# Patient Record
Sex: MALE | Race: WHITE | Employment: OTHER | URBAN - METROPOLITAN AREA
[De-identification: names, ages, dates, MRNs, and addresses within clinical notes are randomized per-mention and may not be internally consistent; named-entity substitution may affect disease eponyms.]

---

## 2017-08-01 ENCOUNTER — HOSPITAL ENCOUNTER (EMERGENCY)
Age: 77
Discharge: HOME | End: 2017-08-01
Attending: EMERGENCY MEDICINE
Payer: MEDICARE

## 2017-08-01 ENCOUNTER — APPOINTMENT (OUTPATIENT)
Dept: GENERAL RADIOLOGY | Age: 77
End: 2017-08-01
Attending: EMERGENCY MEDICINE
Payer: MEDICARE

## 2017-08-01 VITALS
TEMPERATURE: 98 F | BODY MASS INDEX: 32.93 KG/M2 | WEIGHT: 230 LBS | HEART RATE: 71 BPM | DIASTOLIC BLOOD PRESSURE: 74 MMHG | SYSTOLIC BLOOD PRESSURE: 131 MMHG | OXYGEN SATURATION: 96 % | RESPIRATION RATE: 18 BRPM | HEIGHT: 70 IN

## 2017-08-01 DIAGNOSIS — M25.552 LEFT HIP PAIN: Primary | ICD-10-CM

## 2017-08-01 PROCEDURE — 73502 X-RAY EXAM HIP UNI 2-3 VIEWS: CPT

## 2017-08-01 PROCEDURE — 74011250637 HC RX REV CODE- 250/637: Performed by: EMERGENCY MEDICINE

## 2017-08-01 PROCEDURE — 99283 EMERGENCY DEPT VISIT LOW MDM: CPT | Performed by: EMERGENCY MEDICINE

## 2017-08-01 RX ORDER — HYDROCODONE BITARTRATE AND ACETAMINOPHEN 5; 325 MG/1; MG/1
1 TABLET ORAL ONCE
Status: COMPLETED | OUTPATIENT
Start: 2017-08-01 | End: 2017-08-01

## 2017-08-01 RX ADMIN — HYDROCODONE BITARTRATE AND ACETAMINOPHEN 1 TABLET: 5; 325 TABLET ORAL at 11:08

## 2017-08-01 NOTE — ED TRIAGE NOTES
Pt states pain to his left hip for the past week or so. Pt states \"my hip just goes out on me\". Pt denies any recent trauma or falls.

## 2017-08-01 NOTE — ED PROVIDER NOTES
HPI Comments: 68-year-old male with history of left hip replacement presents with complaint of aching, nonradiating left hip pain that has been present over the past several weeks. Denies any recent trauma or injury. Patient rates the severity of pain as mild. Patient denies any numbness, tingling, major issues with ambulating, PC, SOB, nausea, vomiting. Denies any alleviating factors. States pain is exacerbated with movement. Patient is a 78 y.o. male presenting with hip pain. The history is provided by the patient. No  was used. Hip Injury    This is a recurrent problem. The current episode started more than 1 week ago. The problem occurs constantly. The problem has not changed since onset. The pain is present in the left hip. The quality of the pain is described as aching. The pain is at a severity of 2/10. The pain is mild. Pertinent negatives include no numbness, full range of motion, no tingling, no itching, no back pain and no neck pain. He has tried OTC pain medications for the symptoms. The treatment provided mild relief. There has been no history of extremity trauma. Past Medical History:   Diagnosis Date    Arthritis     Atrial fibrillation ([address]     Predominantly atrial flutter; flecainide 2014; atrial flutter ablation 2/11/16; unclear extent of a fib    Calculus of kidney     Conjunctivitis unspecified     Diabetes mellitus type II, uncontrolled ([address]     BS run between(100-170). Low BS s/sx:weak, shakes, =bs below 60.    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    Type I (juvenile type) diabetes mellitus without mention of complication, not stated as uncontrolled     Unspecified hyperplasia of prostate without urinary obstruction and other lower urinary tract symptoms (LUTS)        Past Surgical History:   Procedure Laterality Date    HX BACK SURGERY  1989    bulging disc repair    HX HIP REPLACEMENT Left 3/1/12         Family History:   Problem Relation Age of Onset    Alzheimer Mother     Heart Failure Father      congestive    Heart Disease Father     Alzheimer Brother     Heart Disease Brother     Diabetes Sister     Malignant Hyperthermia Neg Hx     Pseudocholinesterase Deficiency Neg Hx     Delayed Awakening Neg Hx     Post-op Nausea/Vomiting Neg Hx     Emergence Delirium Neg Hx     Post-op Cognitive Dysfunction Neg Hx     Other Neg Hx     Cancer Neg Hx        Social History     Social History    Marital status:      Spouse name: N/A    Number of children: N/A    Years of education: N/A     Occupational History    Not on file. Social History Main Topics    Smoking status: Never Smoker    Smokeless tobacco: Never Used    Alcohol use No    Drug use: No    Sexual activity: Not on file     Other Topics Concern    Not on file     Social History Narrative         ALLERGIES: Review of patient's allergies indicates no known allergies. Review of Systems   Constitutional: Negative for chills and fever. HENT: Negative for congestion, facial swelling and sore throat. Eyes: Negative for pain, redness and visual disturbance. Respiratory: Negative for cough and shortness of breath. Cardiovascular: Negative for chest pain, palpitations and leg swelling. Gastrointestinal: Negative for abdominal pain, constipation, nausea and vomiting. Endocrine: Negative for polydipsia and polyphagia. Genitourinary: Negative for dysuria and hematuria. Musculoskeletal: Positive for arthralgias. Negative for back pain, joint swelling, myalgias and neck pain. Skin: Negative for itching, pallor and wound.    Neurological: Negative for dizziness, tingling, weakness, numbness and headaches. Psychiatric/Behavioral: Negative for agitation and confusion. Vitals:    08/01/17 1032   BP: 141/90   Pulse: 62   Resp: 18   Temp: 97.6 °F (36.4 °C)   SpO2: 96%   Weight: 104.3 kg (230 lb)   Height: 5' 10\" (1.778 m)            Physical Exam   Constitutional: He is oriented to person, place, and time. He appears well-developed and well-nourished. HENT:   Head: Normocephalic. Mouth/Throat: Oropharynx is clear and moist. No oropharyngeal exudate. Eyes: Conjunctivae and EOM are normal. Pupils are equal, round, and reactive to light. Neck: Normal range of motion. No JVD present. No tracheal deviation present. Cardiovascular: Normal rate, regular rhythm, normal heart sounds and intact distal pulses. Exam reveals no gallop and no friction rub. No murmur heard. Pulmonary/Chest: Effort normal and breath sounds normal. No respiratory distress. He has no wheezes. He has no rales. He exhibits no tenderness. Abdominal: Soft. Bowel sounds are normal. He exhibits no distension and no mass. There is no tenderness. There is no rebound and no guarding. Musculoskeletal: He exhibits no edema, tenderness or deformity. Mild left hip TTP. ROM slightly limited 2/2 pain. DP pulses 2+ and equal bilaterally. Strength 5/5 bilaterally. Normal sensory. Neurological: He is alert and oriented to person, place, and time. No cranial nerve deficit. Coordination normal.   Skin: Skin is warm and dry. Nursing note and vitals reviewed.        MDM  Number of Diagnoses or Management Options  Left hip pain: new and requires workup  Diagnosis management comments: Left hip fracture  Left hip Dislocation       Amount and/or Complexity of Data Reviewed  Tests in the radiology section of CPT®: reviewed and ordered  Review and summarize past medical records: yes  Independent visualization of images, tracings, or specimens: yes    Risk of Complications, Morbidity, and/or Mortality  Presenting problems: moderate  Diagnostic procedures: low  Management options: low    Patient Progress  Patient progress: stable    ED Course   Comment By Time   XR left hip w/ no acute osseous abnormality of the left hip evident by plain film imaging. Sofie Monterroso MD 08/01 0676   Patient states underwent hip replacement by Dr. Lizette Oscar. VSS. Pt well appearing. Pt ambulatory without assistance. Will have patient follow up with orthopedics in 1-2 days.  Sofie Monterroso MD 08/01 1149       Procedures

## 2017-11-27 PROBLEM — R42 DIZZINESS: Status: ACTIVE | Noted: 2017-11-27

## 2018-02-09 ENCOUNTER — HOSPITAL ENCOUNTER (EMERGENCY)
Age: 78
Discharge: HOME | End: 2018-02-09
Attending: EMERGENCY MEDICINE
Payer: MEDICARE

## 2018-02-09 ENCOUNTER — APPOINTMENT (OUTPATIENT)
Dept: CT IMAGING | Age: 78
End: 2018-02-09
Attending: EMERGENCY MEDICINE
Payer: MEDICARE

## 2018-02-09 VITALS
HEART RATE: 62 BPM | OXYGEN SATURATION: 94 % | HEIGHT: 70 IN | SYSTOLIC BLOOD PRESSURE: 153 MMHG | RESPIRATION RATE: 16 BRPM | BODY MASS INDEX: 32.93 KG/M2 | TEMPERATURE: 97.4 F | WEIGHT: 230 LBS | DIASTOLIC BLOOD PRESSURE: 66 MMHG

## 2018-02-09 DIAGNOSIS — R19.7 DIARRHEA OF PRESUMED INFECTIOUS ORIGIN: Primary | ICD-10-CM

## 2018-02-09 LAB
ALBUMIN SERPL-MCNC: 3.3 G/DL (ref 3.2–4.6)
ALBUMIN/GLOB SERPL: 0.9 {RATIO} (ref 1.2–3.5)
ALP SERPL-CCNC: 85 U/L (ref 50–136)
ALT SERPL-CCNC: 60 U/L (ref 12–65)
ANION GAP SERPL CALC-SCNC: 11 MMOL/L (ref 7–16)
AST SERPL-CCNC: 41 U/L (ref 15–37)
ATRIAL RATE: 85 BPM
BASOPHILS # BLD: 0 K/UL (ref 0–0.2)
BASOPHILS NFR BLD: 1 % (ref 0–2)
BILIRUB SERPL-MCNC: 0.4 MG/DL (ref 0.2–1.1)
BUN SERPL-MCNC: 14 MG/DL (ref 8–23)
CALCIUM SERPL-MCNC: 8.1 MG/DL (ref 8.3–10.4)
CALCULATED P AXIS, ECG09: 60 DEGREES
CALCULATED R AXIS, ECG10: 8 DEGREES
CALCULATED T AXIS, ECG11: 3 DEGREES
CHLORIDE SERPL-SCNC: 105 MMOL/L (ref 98–107)
CO2 SERPL-SCNC: 23 MMOL/L (ref 21–32)
CREAT SERPL-MCNC: 0.95 MG/DL (ref 0.8–1.5)
DIAGNOSIS, 93000: NORMAL
DIFFERENTIAL METHOD BLD: ABNORMAL
EOSINOPHIL # BLD: 0.1 K/UL (ref 0–0.8)
EOSINOPHIL NFR BLD: 2 % (ref 0.5–7.8)
ERYTHROCYTE [DISTWIDTH] IN BLOOD BY AUTOMATED COUNT: 13 % (ref 11.9–14.6)
GLOBULIN SER CALC-MCNC: 3.7 G/DL (ref 2.3–3.5)
GLUCOSE SERPL-MCNC: 258 MG/DL (ref 65–100)
HCT VFR BLD AUTO: 37.5 % (ref 41.1–50.3)
HGB BLD-MCNC: 12.3 G/DL (ref 13.6–17.2)
IMM GRANULOCYTES # BLD: 0.1 K/UL (ref 0–0.5)
IMM GRANULOCYTES NFR BLD AUTO: 2 % (ref 0–5)
LACTATE BLD-SCNC: 0.9 MMOL/L (ref 0.5–1.9)
LIPASE SERPL-CCNC: 526 U/L (ref 73–393)
LYMPHOCYTES # BLD: 1.2 K/UL (ref 0.5–4.6)
LYMPHOCYTES NFR BLD: 19 % (ref 13–44)
MCH RBC QN AUTO: 30.6 PG (ref 26.1–32.9)
MCHC RBC AUTO-ENTMCNC: 32.8 G/DL (ref 31.4–35)
MCV RBC AUTO: 93.3 FL (ref 79.6–97.8)
MONOCYTES # BLD: 0.4 K/UL (ref 0.1–1.3)
MONOCYTES NFR BLD: 6 % (ref 4–12)
NEUTS SEG # BLD: 4.4 K/UL (ref 1.7–8.2)
NEUTS SEG NFR BLD: 70 % (ref 43–78)
P-R INTERVAL, ECG05: 132 MS
PLATELET # BLD AUTO: 181 K/UL (ref 150–450)
PMV BLD AUTO: 10 FL (ref 10.8–14.1)
POTASSIUM SERPL-SCNC: 3.5 MMOL/L (ref 3.5–5.1)
PROT SERPL-MCNC: 7 G/DL (ref 6.3–8.2)
Q-T INTERVAL, ECG07: 376 MS
QRS DURATION, ECG06: 90 MS
QTC CALCULATION (BEZET), ECG08: 447 MS
RBC # BLD AUTO: 4.02 M/UL (ref 4.23–5.67)
SODIUM SERPL-SCNC: 139 MMOL/L (ref 136–145)
VENTRICULAR RATE, ECG03: 85 BPM
WBC # BLD AUTO: 6.3 K/UL (ref 4.3–11.1)

## 2018-02-09 PROCEDURE — 96374 THER/PROPH/DIAG INJ IV PUSH: CPT | Performed by: NURSE PRACTITIONER

## 2018-02-09 PROCEDURE — 83605 ASSAY OF LACTIC ACID: CPT

## 2018-02-09 PROCEDURE — 81003 URINALYSIS AUTO W/O SCOPE: CPT | Performed by: NURSE PRACTITIONER

## 2018-02-09 PROCEDURE — 74011636320 HC RX REV CODE- 636/320: Performed by: EMERGENCY MEDICINE

## 2018-02-09 PROCEDURE — 93005 ELECTROCARDIOGRAM TRACING: CPT | Performed by: EMERGENCY MEDICINE

## 2018-02-09 PROCEDURE — 85025 COMPLETE CBC W/AUTO DIFF WBC: CPT | Performed by: EMERGENCY MEDICINE

## 2018-02-09 PROCEDURE — 74011250636 HC RX REV CODE- 250/636: Performed by: EMERGENCY MEDICINE

## 2018-02-09 PROCEDURE — 83690 ASSAY OF LIPASE: CPT | Performed by: EMERGENCY MEDICINE

## 2018-02-09 PROCEDURE — 99284 EMERGENCY DEPT VISIT MOD MDM: CPT | Performed by: NURSE PRACTITIONER

## 2018-02-09 PROCEDURE — 74011000258 HC RX REV CODE- 258: Performed by: EMERGENCY MEDICINE

## 2018-02-09 PROCEDURE — 80053 COMPREHEN METABOLIC PANEL: CPT | Performed by: EMERGENCY MEDICINE

## 2018-02-09 RX ORDER — AMOXICILLIN AND CLAVULANATE POTASSIUM 875; 125 MG/1; MG/1
1 TABLET, FILM COATED ORAL 2 TIMES DAILY
Qty: 14 TAB | Refills: 0 | Status: SHIPPED | OUTPATIENT
Start: 2018-02-09 | End: 2018-02-16

## 2018-02-09 RX ORDER — SAME BUTANEDISULFONATE/BETAINE 400-600 MG
1 POWDER IN PACKET (EA) ORAL ONCE
Status: CANCELLED | OUTPATIENT
Start: 2018-02-09 | End: 2018-02-10

## 2018-02-09 RX ORDER — SODIUM CHLORIDE 0.9 % (FLUSH) 0.9 %
10 SYRINGE (ML) INJECTION
Status: COMPLETED | OUTPATIENT
Start: 2018-02-09 | End: 2018-02-09

## 2018-02-09 RX ORDER — METRONIDAZOLE 500 MG/1
500 TABLET ORAL
Status: CANCELLED | OUTPATIENT
Start: 2018-02-09 | End: 2018-02-09

## 2018-02-09 RX ORDER — ONDANSETRON 2 MG/ML
4 INJECTION INTRAMUSCULAR; INTRAVENOUS
Status: COMPLETED | OUTPATIENT
Start: 2018-02-09 | End: 2018-02-09

## 2018-02-09 RX ORDER — SODIUM CHLORIDE 9 MG/ML
1000 INJECTION, SOLUTION INTRAVENOUS ONCE
Status: COMPLETED | OUTPATIENT
Start: 2018-02-09 | End: 2018-02-09

## 2018-02-09 RX ORDER — CIPROFLOXACIN 500 MG/1
500 TABLET ORAL EVERY 12 HOURS
Status: CANCELLED | OUTPATIENT
Start: 2018-02-09

## 2018-02-09 RX ORDER — METRONIDAZOLE 500 MG/1
500 TABLET ORAL 2 TIMES DAILY
Qty: 14 TAB | Refills: 0 | Status: SHIPPED | OUTPATIENT
Start: 2018-02-09 | End: 2018-02-16

## 2018-02-09 RX ADMIN — IOPAMIDOL 100 ML: 755 INJECTION, SOLUTION INTRAVENOUS at 14:27

## 2018-02-09 RX ADMIN — SODIUM CHLORIDE 100 ML: 900 INJECTION, SOLUTION INTRAVENOUS at 14:28

## 2018-02-09 RX ADMIN — ONDANSETRON 4 MG: 2 INJECTION INTRAMUSCULAR; INTRAVENOUS at 13:47

## 2018-02-09 RX ADMIN — Medication 10 ML: at 14:28

## 2018-02-09 RX ADMIN — SODIUM CHLORIDE 1000 ML: 900 INJECTION, SOLUTION INTRAVENOUS at 13:47

## 2018-02-09 NOTE — DISCHARGE INSTRUCTIONS
Encourage plenty of fluids and rest, and advance her diet and activity as tolerated. Use the antibiotics and the probiotic as prescribed until gone. Follow-up with your regular doctor as discussed for further evaluation and treatment. Return to the ED if he has any new or worsened symptoms, including fever, worsening abdominal pain or swelling, difficulty urinating, difficulty walking, passing out, or chest pain. Gastroenteritis: Care Instructions  Your Care Instructions    Gastroenteritis is an illness that may cause nausea, vomiting, and diarrhea. It is sometimes called \"stomach flu. \" It can be caused by bacteria or a virus. You will probably begin to feel better in 1 to 2 days. In the meantime, get plenty of rest and make sure you do not become dehydrated. Dehydration occurs when your body loses too much fluid. Follow-up care is a key part of your treatment and safety. Be sure to make and go to all appointments, and call your doctor if you are having problems. It's also a good idea to know your test results and keep a list of the medicines you take. How can you care for yourself at home? · If your doctor prescribed antibiotics, take them as directed. Do not stop taking them just because you feel better. You need to take the full course of antibiotics. · Drink plenty of fluids to prevent dehydration, enough so that your urine is light yellow or clear like water. Choose water and other caffeine-free clear liquids until you feel better. If you have kidney, heart, or liver disease and have to limit fluids, talk with your doctor before you increase your fluid intake. · Drink fluids slowly, in frequent, small amounts, because drinking too much too fast can cause vomiting. · Begin eating mild foods, such as dry toast, yogurt, applesauce, bananas, and rice. Avoid spicy, hot, or high-fat foods, and do not drink alcohol or caffeine for a day or two.  Do not drink milk or eat ice cream until you are feeling better. How to prevent gastroenteritis  · Keep hot foods hot and cold foods cold. · Do not eat meats, dressings, salads, or other foods that have been kept at room temperature for more than 2 hours. · Use a thermometer to check your refrigerator. It should be between 34°F and 40°F.  · Defrost meats in the refrigerator or microwave, not on the kitchen counter. · Keep your hands and your kitchen clean. Wash your hands, cutting boards, and countertops with hot soapy water frequently. · Cook meat until it is well done. · Do not eat raw eggs or uncooked sauces made with raw eggs. · Do not take chances. If food looks or tastes spoiled, throw it out. When should you call for help? Call 911 anytime you think you may need emergency care. For example, call if:  ? · You vomit blood or what looks like coffee grounds. ? · You passed out (lost consciousness). ? · You pass maroon or very bloody stools. ?Call your doctor now or seek immediate medical care if:  ? · You have severe belly pain. ? · You have signs of needing more fluids. You have sunken eyes, a dry mouth, and pass only a little dark urine. ? · You feel like you are going to faint. ? · You have increased belly pain that does not go away in 1 to 2 days. ? · You have new or increased nausea, or you are vomiting. ? · You have a new or higher fever. ? · Your stools are black and tarlike or have streaks of blood. ? Watch closely for changes in your health, and be sure to contact your doctor if:  ? · You are dizzy or lightheaded. ? · You urinate less than usual, or your urine is dark yellow or brown. ? · You do not feel better with each day that goes by. Where can you learn more? Go to http://harper-mayco.info/. Enter N142 in the search box to learn more about \"Gastroenteritis: Care Instructions. \"  Current as of: March 3, 2017  Content Version: 11.4  © 6781-7009 Healthwise, [employer].  Care instructions adapted under license by [employer] (which disclaims liability or warranty for this information). If you have questions about a medical condition or this instruction, always ask your healthcare professional. [address] any warranty or liability for your use of this information. Recent Results (from the past 8 hour(s))   AMB POC URINALYSIS DIP STICK MANUAL W/ MICRO    Collection Time: 02/09/18 11:20 AM   Result Value Ref Range    Color (UA POC) Yellow     Clarity (UA POC) Clear     Glucose (UA POC) 4+ Negative    Bilirubin (UA POC) Negative Negative    Ketones (UA POC) 1+ Negative    Specific gravity (UA POC) 1.020 1.001 - 1.035    Blood (UA POC) 4+ Negative    pH (UA POC) 6.0 4.6 - 8.0    Protein (UA POC) 4+ Negative    Urobilinogen (UA POC) 0.2 mg/dL 0.2 - 1    Nitrites (UA POC) Negative Negative    Leukocyte esterase (UA POC) Negative Negative   CBC WITH AUTOMATED DIFF    Collection Time: 02/09/18 11:52 AM   Result Value Ref Range    WBC 6.3 4.3 - 11.1 K/uL    RBC 4.02 (L) 4.23 - 5.67 M/uL    HGB 12.3 (L) 13.6 - 17.2 g/dL    HCT 37.5 (L) 41.1 - 50.3 %    MCV 93.3 79.6 - 97.8 FL    MCH 30.6 26.1 - 32.9 PG    MCHC 32.8 31.4 - 35.0 g/dL    RDW 13.0 11.9 - 14.6 %    PLATELET 998 252 - 573 K/uL    MPV 10.0 (L) 10.8 - 14.1 FL    DF AUTOMATED      NEUTROPHILS 70 43 - 78 %    LYMPHOCYTES 19 13 - 44 %    MONOCYTES 6 4.0 - 12.0 %    EOSINOPHILS 2 0.5 - 7.8 %    BASOPHILS 1 0.0 - 2.0 %    IMMATURE GRANULOCYTES 2 0.0 - 5.0 %    ABS. NEUTROPHILS 4.4 1.7 - 8.2 K/UL    ABS. LYMPHOCYTES 1.2 0.5 - 4.6 K/UL    ABS. MONOCYTES 0.4 0.1 - 1.3 K/UL    ABS. EOSINOPHILS 0.1 0.0 - 0.8 K/UL    ABS. BASOPHILS 0.0 0.0 - 0.2 K/UL    ABS. IMM.  GRANS. 0.1 0.0 - 0.5 K/UL   METABOLIC PANEL, COMPREHENSIVE    Collection Time: 02/09/18 11:52 AM   Result Value Ref Range    Sodium 139 136 - 145 mmol/L    Potassium 3.5 3.5 - 5.1 mmol/L    Chloride 105 98 - 107 mmol/L    CO2 23 21 - 32 mmol/L    Anion gap 11 7 - 16 mmol/L Glucose 258 (H) 65 - 100 mg/dL    BUN 14 8 - 23 MG/DL    Creatinine 0.95 0.8 - 1.5 MG/DL    GFR est AA >60 >60 ml/min/1.73m2    GFR est non-AA >60 >60 ml/min/1.73m2    Calcium 8.1 (L) 8.3 - 10.4 MG/DL    Bilirubin, total 0.4 0.2 - 1.1 MG/DL    ALT (SGPT) 60 12 - 65 U/L    AST (SGOT) 41 (H) 15 - 37 U/L    Alk.  phosphatase 85 50 - 136 U/L    Protein, total 7.0 6.3 - 8.2 g/dL    Albumin 3.3 3.2 - 4.6 g/dL    Globulin 3.7 (H) 2.3 - 3.5 g/dL    A-G Ratio 0.9 (L) 1.2 - 3.5     LIPASE    Collection Time: 02/09/18 11:52 AM   Result Value Ref Range    Lipase 526 (H) 73 - 393 U/L   POC LACTIC ACID    Collection Time: 02/09/18  1:38 PM   Result Value Ref Range    Lactic Acid (POC) 0.9 0.5 - 1.9 mmol/L

## 2018-02-09 NOTE — ED PROVIDER NOTES
Patient is a 78 y.o. male presenting with abdominal pain. The history is provided by the patient. No  was used. Abdominal Pain    This is a new problem. The current episode started more than 2 days ago. The problem occurs constantly. The problem has been gradually worsening. The pain is associated with vomiting (diarrhea). The pain is located in the generalized abdominal region. The quality of the pain is cramping. Associated symptoms include diarrhea, nausea and vomiting (resolved now). Pertinent negatives include no fever, no constipation, no frequency, no hematuria, no chest pain and no back pain. this patient presents to ED with complaint of diffuse abdominal distention and discomfort described as cramping pain onset 7 days ago, and was seen and admitted at [facility] in [place], where he was discharged home last night. He states that they obtained several abdominal x-rays, laboratory work, stool studies, and a CT scan of his abdomen, which were all negative. He states that when he got home, he continued to have profuse diarrhea, has had 20 episodes of diarrhea onset since last night. In addition, he states that his abdomen has been progressively distending, and is complaining of generalized discomfort associated with this. Of note, he also states that he was treated for what appeared to be an aspiration pneumonia while at [facility] [place], though he states that he is not currently taking any antibiotics for this. Past Medical History:   Diagnosis Date    Arthritis     Atrial [address] [address]     Predominantly atrial flutter; flecainide 2014; atrial flutter ablation 2/11/16; unclear extent of a fib    Calculus of kidney     Conjunctivitis unspecified     Diabetes mellitus type II, uncontrolled ([address]     BS run between(100-170). Low BS s/sx:weak, shakes, =bs below 60.    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    Type I (juvenile type) diabetes mellitus without mention of complication, not stated as uncontrolled     Unspecified hyperplasia of prostate without urinary obstruction and other lower urinary tract symptoms (LUTS)        Past Surgical History:   Procedure Laterality Date    HX BACK SURGERY  1989    bulging disc repair    HX HIP REPLACEMENT Left 3/1/12         Family History:   Problem Relation Age of Onset    Alzheimer Mother     Heart Failure Father      congestive    Heart Disease Father     Alzheimer Brother     Heart Disease Brother     Diabetes Sister     Malignant Hyperthermia Neg Hx     Pseudocholinesterase Deficiency Neg Hx     Delayed Awakening Neg Hx     Post-op Nausea/Vomiting Neg Hx     Emergence Delirium Neg Hx     Post-op Cognitive Dysfunction Neg Hx     Other Neg Hx     Cancer Neg Hx        Social History     Social History    Marital status:      Spouse name: N/A    Number of children: N/A    Years of education: N/A     Occupational History    Not on file. Social History Main Topics    Smoking status: Never Smoker    Smokeless tobacco: Never Used    Alcohol use No    Drug use: No    Sexual activity: Not on file     Other Topics Concern    Not on file     Social History Narrative         ALLERGIES: Review of patient's allergies indicates no known allergies. Review of Systems   Constitutional: Negative for chills, fatigue and fever. HENT: Negative for congestion and sinus pressure. Eyes: Negative for discharge and redness. Respiratory: Negative for cough, chest tightness, shortness of breath and wheezing. Cardiovascular: Negative for chest pain and palpitations.    Gastrointestinal: Positive for abdominal pain, diarrhea, nausea and vomiting (resolved now). Negative for blood in stool and constipation. Genitourinary: Negative for flank pain, frequency and hematuria. Musculoskeletal: Negative for back pain and neck pain. Skin: Negative for rash and wound. Neurological: Positive for weakness. Negative for numbness. Hematological: Negative for adenopathy. Does not bruise/bleed easily. Vitals:    02/09/18 1142 02/09/18 1323   BP: 171/89 (!) 168/92   Pulse: 88 78   Resp: 18 14   Temp: 97.4 °F (36.3 °C)    SpO2: 96% 92%   Weight: 104.3 kg (230 lb)    Height: 5' 10\" (1.778 m)             Physical Exam   Constitutional: He is oriented to person, place, and time. He appears well-developed and well-nourished. No distress. HENT:   Head: Normocephalic and atraumatic. Neck: Normal range of motion. Neck supple. No tracheal deviation present. Cardiovascular: Normal rate, normal heart sounds and intact distal pulses. Exam reveals no gallop and no friction rub. No murmur heard. Pulmonary/Chest: Effort normal and breath sounds normal. No stridor. No respiratory distress. He has no wheezes. He has no rales. He exhibits no tenderness. Abdominal: Soft. Bowel sounds are normal. He exhibits distension (with tympany to percussion). There is tenderness (diffuse, generalized, mild). There is no rebound and no guarding. Musculoskeletal: Normal range of motion. He exhibits edema (1+ edema at B ankles). Neurological: He is alert and oriented to person, place, and time. Skin: Skin is warm and dry. No rash noted. No erythema. Vitals reviewed. MDM  Number of Diagnoses or Management Options  Diarrhea of presumed infectious origin: new and requires workup  Diagnosis management comments: Laboratory workup grossly within normal limits.   CT reported that patient was unable to undergo radiologic examination due to previous barium swallow study done at [facility].  Patient returned to his room. I discussed the plan of care with the family, who recommended that given that he had a prolonged hospital stay recently, that perhaps outpatient therapy would be warranted, and I was encouraged by his laboratory workup being within normal limits. The family seemed to be understanding of this, and I told him that I would readdress this with the patient once he returned from [facility].    2/9/2018  3:16 PM  Darrick Rodrigez  I discussed with the family present at the bedside and with the patient regarding the plan of care for outpatient therapy. I will order a dose of antibiotics to be given to the patient here in the ED, but the patient and family both would prefer to be dispositioned home at this time. I did discuss return precautions with him. The patient and the family at the bedside both voiced full understanding and compliance with the plan. Amount and/or Complexity of Data Reviewed  Clinical lab tests: reviewed and ordered  Tests in the radiology section of CPT®: ordered  Decide to obtain previous medical records or to obtain history from someone other than the patient: yes  Obtain history from someone other than the patient: yes  Review and summarize past medical records: yes  Discuss the patient with other providers: yes    Risk of Complications, Morbidity, and/or Mortality  Presenting problems: moderate  Diagnostic procedures: low  Management options: moderate    Patient Progress  Patient progress: stable    Patient seen and examined and discussed with Minda Trimble nurse practitioner Dr. Rosemary Romero student    72-year-old male discharged from [facility] yesterday. Had had diarrhea while in the hospital persisted today. Did not go home with any nausea medicine or any diarrhea medicines.       Unable to get CT of the a/p due to recent barium    Labs reviewed    Karma Hankins MD; 2/9/2018 @3:33 PM===========================================    ED Course       Procedures

## 2018-02-09 NOTE — ED TRIAGE NOTES
Pt was just discharged from [facility] last night for abdominal pain and swelling to his stomach. Pt states the symptoms are not getting any better so his doctor told him to come here today. Pt states he is still having diarrhea  Ems states the pt went into a fib with rvr that only lasted a min.

## 2018-02-09 NOTE — ED NOTES
I have reviewed discharge instructions with the patient. The patient verbalized understanding. Patient left ED via Discharge Method: wheelchair to Home with (insert name of family/friend, self, transport via wife). Opportunity for questions and clarification provided. Patient given 2 scripts. To continue your aftercare when you leave the hospital, you may receive an automated call from our care team to check in on how you are doing. This is a free service and part of our promise to provide the best care and service to meet your aftercare needs.  If you have questions, or wish to unsubscribe from this service please call [phone]. Thank you for Choosing our [facility] Emergency Department.

## 2018-02-09 NOTE — PROGRESS NOTES
Patient states he was at another hospital for 5 days, discharged last night. While there had an UGI, barium still in colon, can not have CT as it will streak due to this. Attempted to notify both Dr. Jaguar Aceves, ordering and RN both of whom were unavailable. Asked they call CT.

## 2018-04-23 ENCOUNTER — HOSPITAL ENCOUNTER (OUTPATIENT)
Dept: LAB | Age: 78
Discharge: HOME | End: 2018-04-23

## 2018-04-23 PROCEDURE — 88305 TISSUE EXAM BY PATHOLOGIST: CPT | Performed by: INTERNAL MEDICINE

## 2018-06-12 PROBLEM — D12.2 ADENOMATOUS POLYP OF ASCENDING COLON: Status: ACTIVE | Noted: 2018-06-12

## 2018-09-12 PROBLEM — E11.21 TYPE 2 DIABETES WITH NEPHROPATHY (HCC): Status: ACTIVE | Noted: 2018-09-12

## 2019-07-05 PROBLEM — E11.21 TYPE 2 DIABETES WITH NEPHROPATHY (HCC): Status: ACTIVE | Noted: 2019-07-05

## 2019-10-14 ENCOUNTER — HOSPITAL ENCOUNTER (OUTPATIENT)
Dept: LAB | Age: 79
Discharge: HOME | End: 2019-10-14
Payer: MEDICARE

## 2019-10-14 DIAGNOSIS — Z79.01 ANTICOAGULANT LONG-TERM USE: ICD-10-CM

## 2019-10-14 DIAGNOSIS — R06.09 DYSPNEA ON EXERTION: ICD-10-CM

## 2019-10-14 LAB
BASOPHILS # BLD: 0 K/UL (ref 0–0.2)
BASOPHILS NFR BLD: 1 % (ref 0–2)
DIFFERENTIAL METHOD BLD: ABNORMAL
EOSINOPHIL # BLD: 0.1 K/UL (ref 0–0.8)
EOSINOPHIL NFR BLD: 3 % (ref 0.5–7.8)
ERYTHROCYTE [DISTWIDTH] IN BLOOD BY AUTOMATED COUNT: 13.3 % (ref 11.9–14.6)
HCT VFR BLD AUTO: 33.6 % (ref 41.1–50.3)
HGB BLD-MCNC: 10.5 G/DL (ref 13.6–17.2)
IMM GRANULOCYTES # BLD AUTO: 0 K/UL (ref 0–0.5)
IMM GRANULOCYTES NFR BLD AUTO: 0 % (ref 0–5)
LYMPHOCYTES # BLD: 1.3 K/UL (ref 0.5–4.6)
LYMPHOCYTES NFR BLD: 26 % (ref 13–44)
MCH RBC QN AUTO: 30.6 PG (ref 26.1–32.9)
MCHC RBC AUTO-ENTMCNC: 31.3 G/DL (ref 31.4–35)
MCV RBC AUTO: 98 FL (ref 79.6–97.8)
MONOCYTES # BLD: 0.7 K/UL (ref 0.1–1.3)
MONOCYTES NFR BLD: 13 % (ref 4–12)
NEUTS SEG # BLD: 2.9 K/UL (ref 1.7–8.2)
NEUTS SEG NFR BLD: 56 % (ref 43–78)
NRBC # BLD: 0 K/UL (ref 0–0.2)
PLATELET # BLD AUTO: 155 K/UL (ref 150–450)
PMV BLD AUTO: 10.8 FL (ref 9.4–12.3)
RBC # BLD AUTO: 3.43 M/UL (ref 4.23–5.6)
WBC # BLD AUTO: 5.1 K/UL (ref 4.3–11.1)

## 2019-10-14 PROCEDURE — 85025 COMPLETE CBC W/AUTO DIFF WBC: CPT

## 2019-10-14 PROCEDURE — 36415 COLL VENOUS BLD VENIPUNCTURE: CPT

## 2019-12-17 ENCOUNTER — HOSPITAL ENCOUNTER (OUTPATIENT)
Dept: LAB | Age: 79
Discharge: HOME | End: 2019-12-17

## 2019-12-17 PROCEDURE — 88305 TISSUE EXAM BY PATHOLOGIST: CPT

## 2019-12-17 PROCEDURE — 88312 SPECIAL STAINS GROUP 1: CPT

## 2019-12-23 ENCOUNTER — HOME HEALTH ADMISSION (OUTPATIENT)
Dept: HOME HEALTH SERVICES | Facility: HOME HEALTH | Age: 79
End: 2019-12-23
Payer: MEDICARE

## 2019-12-23 ENCOUNTER — HOSPITAL ENCOUNTER (OUTPATIENT)
Dept: SURGERY | Age: 79
Discharge: HOME | End: 2019-12-23
Payer: MEDICARE

## 2019-12-23 ENCOUNTER — HOSPITAL ENCOUNTER (OUTPATIENT)
Dept: PHYSICAL THERAPY | Age: 79
Discharge: HOME | End: 2019-12-23
Payer: MEDICARE

## 2019-12-23 VITALS
BODY MASS INDEX: 33.36 KG/M2 | SYSTOLIC BLOOD PRESSURE: 159 MMHG | OXYGEN SATURATION: 95 % | HEART RATE: 56 BPM | DIASTOLIC BLOOD PRESSURE: 83 MMHG | HEIGHT: 70 IN | TEMPERATURE: 96.5 F | RESPIRATION RATE: 16 BRPM | WEIGHT: 233 LBS

## 2019-12-23 LAB
ANION GAP SERPL CALC-SCNC: 6 MMOL/L (ref 7–16)
APTT PPP: 29.5 SEC (ref 24.7–39.8)
BACTERIA SPEC CULT: NORMAL
BASOPHILS # BLD: 0 K/UL (ref 0–0.2)
BASOPHILS NFR BLD: 1 % (ref 0–2)
BUN SERPL-MCNC: 22 MG/DL (ref 8–23)
CALCIUM SERPL-MCNC: 9.2 MG/DL (ref 8.3–10.4)
CHLORIDE SERPL-SCNC: 107 MMOL/L (ref 98–107)
CO2 SERPL-SCNC: 27 MMOL/L (ref 21–32)
CREAT SERPL-MCNC: 1.19 MG/DL (ref 0.8–1.5)
DIFFERENTIAL METHOD BLD: ABNORMAL
EOSINOPHIL # BLD: 0.1 K/UL (ref 0–0.8)
EOSINOPHIL NFR BLD: 2 % (ref 0.5–7.8)
ERYTHROCYTE [DISTWIDTH] IN BLOOD BY AUTOMATED COUNT: 13.6 % (ref 11.9–14.6)
EST. AVERAGE GLUCOSE BLD GHB EST-MCNC: 177 MG/DL
GLUCOSE SERPL-MCNC: 174 MG/DL (ref 65–100)
HBA1C MFR BLD: 7.8 %
HCT VFR BLD AUTO: 38.5 % (ref 41.1–50.3)
HGB BLD-MCNC: 12.1 G/DL (ref 13.6–17.2)
IMM GRANULOCYTES # BLD AUTO: 0 K/UL (ref 0–0.5)
IMM GRANULOCYTES NFR BLD AUTO: 1 % (ref 0–5)
INR PPP: 1.3
LYMPHOCYTES # BLD: 1.4 K/UL (ref 0.5–4.6)
LYMPHOCYTES NFR BLD: 28 % (ref 13–44)
MCH RBC QN AUTO: 30.5 PG (ref 26.1–32.9)
MCHC RBC AUTO-ENTMCNC: 31.4 G/DL (ref 31.4–35)
MCV RBC AUTO: 97 FL (ref 79.6–97.8)
MONOCYTES # BLD: 0.6 K/UL (ref 0.1–1.3)
MONOCYTES NFR BLD: 12 % (ref 4–12)
NEUTS SEG # BLD: 2.9 K/UL (ref 1.7–8.2)
NEUTS SEG NFR BLD: 56 % (ref 43–78)
NRBC # BLD: 0 K/UL (ref 0–0.2)
PLATELET # BLD AUTO: 163 K/UL (ref 150–450)
PMV BLD AUTO: 10.8 FL (ref 9.4–12.3)
POTASSIUM SERPL-SCNC: 4.1 MMOL/L (ref 3.5–5.1)
PROTHROMBIN TIME: 16.2 SEC (ref 11.7–14.5)
RBC # BLD AUTO: 3.97 M/UL (ref 4.23–5.6)
SERVICE CMNT-IMP: NORMAL
SODIUM SERPL-SCNC: 140 MMOL/L (ref 136–145)
WBC # BLD AUTO: 5.1 K/UL (ref 4.3–11.1)

## 2019-12-23 PROCEDURE — 97161 PT EVAL LOW COMPLEX 20 MIN: CPT

## 2019-12-23 PROCEDURE — 85025 COMPLETE CBC W/AUTO DIFF WBC: CPT

## 2019-12-23 PROCEDURE — 85610 PROTHROMBIN TIME: CPT

## 2019-12-23 PROCEDURE — 87641 MR-STAPH DNA AMP PROBE: CPT

## 2019-12-23 PROCEDURE — 77030027138 HC INCENT SPIROMETER -A

## 2019-12-23 PROCEDURE — 36415 COLL VENOUS BLD VENIPUNCTURE: CPT

## 2019-12-23 PROCEDURE — 80048 BASIC METABOLIC PNL TOTAL CA: CPT

## 2019-12-23 PROCEDURE — 85730 THROMBOPLASTIN TIME PARTIAL: CPT

## 2019-12-23 PROCEDURE — 83036 HEMOGLOBIN GLYCOSYLATED A1C: CPT

## 2019-12-23 RX ORDER — OMEPRAZOLE 20 MG/1
20 CAPSULE, DELAYED RELEASE ORAL DAILY
COMMUNITY
End: 2019-12-23

## 2019-12-23 RX ORDER — FAMOTIDINE 20 MG/1
20 TABLET, FILM COATED ORAL 2 TIMES DAILY
COMMUNITY
End: 2020-09-04 | Stop reason: ALTCHOICE

## 2019-12-23 NOTE — PERIOP NOTES
Patient verified name and . Order for consent yes found in EHR and matches case posting; patient verified. Type 3 surgery, joint camp assessment complete. Labs per surgeon: CBC,BMP, PT/PTT, Hgb A1c ; results within anesthesia guidelines; routed via fax to pcp, Dr Sundar Ellison and to surgeon, Dr Jt Rose for review. Labs per anesthesia protocol: no additional  EKG:completed 10/14/19 and within anesthesia guidelines, placed on chart along with ECHO 10/16/19; progress note Dr Antonia Etienne, cardiologist 10/22/19; and cardiac clearance/med hold for Xarelto 19. MRSA/MSSA swab collected; pharmacy to review and dose antibiotic as appropriate. Hospital approved surgical skin cleanser and instructions to return bottle on DOS given per hospital policy. Patient provided with handouts including Guide to Surgery, Pain Management, Hand Hygiene, Blood Transfusion Education, and [facility] [place] [facility] Brochure. Patient answered medical/surgical history questions at their best of ability. All prior to admission medications documented in [facility] Care. Original medication prescription bottle YES visualized during patient appointment. Patient instructed to hold all vitamins 3 weeks prior to surgery and NSAIDS 5 days prior to surgery. Patient teach back successful and patient demonstrates knowledge of instruction.

## 2019-12-23 NOTE — PROGRESS NOTES
12/23/19 1200   Oxygen Therapy   O2 Sat (%) 96 %   Pulse via Oximetry 50 beats per minute   O2 Device Room air   Pre-Treatment   Breath Sounds Bilateral Clear;Diminished   Pre FEV1 (liters) 2.1 liters   % Predicted 70   Incentive Spirometry Treatment   Actual Volume (ml) 2250 ml     Initial respiratory Assessment completed with pt. Pt was interviewed and evaluated in Joint camp prior to surgery. Patient ID:  Dinorah Aguirre  [id number]  89 y.o.  1940  Surgeon: Dr. Kristin Pollard  Date of Surgery: 1/14/2020  Procedure: Total Right Knee Arthroplasty  Primary Care Physician: Rosario Cleary -[phone]  Specialists:                                  Pt instructed in the use of Incentive Spirometry. Pt instructed to bring Incentive Spirometer back on date of surgery & to start using Is upon return to pt room. Pt taught proper cough technique    History of smoking:   DENIES                                                      Quit date:           Secondhand smoke:FATHER      Past procedures with Oxygen desaturation:DENIES    Past Medical History:   Diagnosis Date    Anemia     Arthritis     Atrial fibrillation ([address]     Predominantly atrial flutter; flecainide 2014; atrial flutter ablation 2/11/16; unclear extent of a fib    Calculus of kidney     Conjunctivitis unspecified     Diabetes mellitus type II, uncontrolled ([address]     BS run between(100-170). Low BS s/sx:weak, shakes, =bs below 60.    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    Type 2 diabetes mellitus ([address] 2004    checks SQBS every 1-2 weeks; HGB A1C 12/23/19:  7.8    Type I (juvenile type) diabetes mellitus without mention of complication, not stated as uncontrolled     PATIENT DENIES    Unspecified hyperplasia of prostate without urinary obstruction and other lower urinary tract symptoms (LUTS)                                                                                                                                                      Respiratory history:DENIES SOB                                                                Respiratory meds:  DENIES                                       FAMILY PRESENT:            SPOUSE,                                                                                         PAST SLEEP STUDY:                     DENIES  HX OF SARAH:                                       DENIES                                     SARAH assessment:                                               SLEEPS ON SIDE       &      BACK                                                          PHYSICAL EXAM   Body mass index is 33.43 kg/m².    Visit Vitals  /83 (BP 1 Location: Left arm, BP Patient Position: Sitting)   Pulse (!) 56   Temp 96.5 °F (35.8 °C)   Resp 16   Ht 5' 10\" (1.778 m)   Wt 105.7 kg (233 lb)   SpO2 95%   BMI 33.43 kg/m²     Neck circumference:  45    cm    Loud snoring:        YES                                 Witnessed apnea or wakening gasping or choking:,             DENIES,                                                                                                 Awakens with headaches:                                                  DENIES    Morning or daytime tiredness/ sleepiness:                                                                                                           TIRED   Dry mouth or sore throat in morning:                YES                                                                         Vega stage:  3    SACS score:35      Stop Bang   STOP-BANG  Does the patient snore loudly (louder than talking or loud enough to be heard through closed doors)?: Yes  Does the patient often feel tired, fatigued, or sleepy during the daytime, even after a \"good\" night's sleep?: Yes  Has anyone ever observed the patient stop breathing during their sleep? : No  Does the patient have or are they being treated for high blood pressure?: Yes  Is the patient's BMI greater than 35?: No  Is your neck circumference greater than 17 inches (Male) or 16 inches (Female)?: Yes  Is the patient older than 48?: Yes  Is the patient male?: Yes  SARAH Score: 6  Has the patient been referred to Sleep Medicine?: No  Has the patient previously been diagnosed with Obstructive Sleep Apnea?: No                            CPAP:                       NONE                                                  CONT SAT HS            Referrals:  DECLINED [facility]  Pt.  Phone Number:

## 2019-12-23 NOTE — PERIOP NOTES
Recent Results (from the past 8 hour(s))   CBC WITH AUTOMATED DIFF    Collection Time: 12/23/19 12:20 PM   Result Value Ref Range    WBC 5.1 4.3 - 11.1 K/uL    RBC 3.97 (L) 4.23 - 5.6 M/uL    HGB 12.1 (L) 13.6 - 17.2 g/dL    HCT 38.5 (L) 41.1 - 50.3 %    MCV 97.0 79.6 - 97.8 FL    MCH 30.5 26.1 - 32.9 PG    MCHC 31.4 31.4 - 35.0 g/dL    RDW 13.6 11.9 - 14.6 %    PLATELET 899 021 - 208 K/uL    MPV 10.8 9.4 - 12.3 FL    ABSOLUTE NRBC 0.00 0.0 - 0.2 K/uL    DF AUTOMATED      NEUTROPHILS 56 43 - 78 %    LYMPHOCYTES 28 13 - 44 %    MONOCYTES 12 4.0 - 12.0 %    EOSINOPHILS 2 0.5 - 7.8 %    BASOPHILS 1 0.0 - 2.0 %    IMMATURE GRANULOCYTES 1 0.0 - 5.0 %    ABS. NEUTROPHILS 2.9 1.7 - 8.2 K/UL    ABS. LYMPHOCYTES 1.4 0.5 - 4.6 K/UL    ABS. MONOCYTES 0.6 0.1 - 1.3 K/UL    ABS. EOSINOPHILS 0.1 0.0 - 0.8 K/UL    ABS. BASOPHILS 0.0 0.0 - 0.2 K/UL    ABS. IMM.  GRANS. 0.0 0.0 - 0.5 K/UL   HEMOGLOBIN A1C WITH EAG    Collection Time: 12/23/19 12:20 PM   Result Value Ref Range    Hemoglobin A1c 7.8 %    Est. average glucose 590 mg/dL   METABOLIC PANEL, BASIC    Collection Time: 12/23/19 12:20 PM   Result Value Ref Range    Sodium 140 136 - 145 mmol/L    Potassium 4.1 3.5 - 5.1 mmol/L    Chloride 107 98 - 107 mmol/L    CO2 27 21 - 32 mmol/L    Anion gap 6 (L) 7 - 16 mmol/L    Glucose 174 (H) 65 - 100 mg/dL    BUN 22 8 - 23 MG/DL    Creatinine 1.19 0.8 - 1.5 MG/DL    GFR est AA >60 >60 ml/min/1.73m2    GFR est non-AA >60 >60 ml/min/1.73m2    Calcium 9.2 8.3 - 10.4 MG/DL   PROTHROMBIN TIME + INR    Collection Time: 12/23/19 12:20 PM   Result Value Ref Range    Prothrombin time 16.2 (H) 11.7 - 14.5 sec    INR 1.3     PTT    Collection Time: 12/23/19 12:20 PM   Result Value Ref Range    aPTT 29.5 24.7 - 39.8 SEC

## 2019-12-23 NOTE — PROGRESS NOTES
Britarun Lisa  : (34 y.o.) [address] at [place] [address].  [phone]       Physical Therapy Prehab Plan of Treatment and Evaluation Summary:2019    ICD-10: Treatment Diagnosis:   · Pain in Right Knee (M25.561)  · Stiffness of Right Knee, Not elsewhere classified (M25.661)  Precautions/Allergies:   Patient has no known allergies. MEDICAL/REFERRING DIAGNOSIS:  Unilateral primary osteoarthritis, right knee [M17.11]  REFERRING PHYSICIAN: Susie Cohen MD  DATE OF SURGERY: 20    Assessment:   Comments:  Scheduled for R TKA. Independent with gait and ADLs. Plans to discharge home with support of spouse     PROBLEM LIST (Impacting functional limitations):  Mr. Cedric Flynn presents with the following right lower extremity(s) problems:  1. Gait  2. Strength  3. Home Exercise Program  4. Pain   INTERVENTIONS PLANNED:  1. Home Exercise Program  2. Educational Discussion      TREATMENT PLAN: Effective Dates: 2019 TO 2019. Frequency/Duration: Patient to continue to perform home exercise program at least twice per day up until his surgery. GOALS: (Goals have been discussed and agreed upon with patient.)  Discharge Goals: Time Frame: 1 Day  1. Patient will demonstrate independence with a home exercise program designed to increase functional technique and pain control to minimize functional deficits and optimize patient for total joint replacement. Rehabilitation Potential For Stated Goals: Good  Regarding Isidro Ratliff's therapy, I certify that the treatment plan above will be carried out by a therapist or under their direction.   Thank you for this referral,  Jennifer Recio PT               HISTORY:   Present Symptoms:  Pain Intensity 1: 5  Pain Location 1: Knee  Pain Orientation 1: Right   History of Present Injury/Illness (Reason for Referral):  Medical/Referring Diagnosis: Unilateral primary osteoarthritis, right knee [M17.11]   Past Medical History/Comorbidities:   Mr. Josemanuel Estevez  has a past medical history of Anemia, Arthritis, Atrial fibrillation ([address] Calculus of kidney, Conjunctivitis unspecified, Diabetes mellitus type II, uncontrolled ([address] Disorders of bursae and tendons in shoulder region, unspecified, Hypertension, Ingrowing nail, Lumbago, Obesity, Osteoarthrosis, unspecified whether generalized or localized, unspecified site, Other ill-defined conditions(799.89) (10/2011), Proteinuria, Status post hip replacement (3/2/2012), Type 2 diabetes mellitus ([address] (2004), Type I (juvenile type) diabetes mellitus without mention of complication, not stated as uncontrolled, and Unspecified hyperplasia of prostate without urinary obstruction and other lower urinary tract symptoms (LUTS). He also has no past medical history of Aneurysm ([address] Asthma, Autoimmune disease ([address] CAD (coronary artery disease), Cancer ([address] Chronic kidney disease, Chronic obstructive pulmonary disease ([address] Chronic pain, Coagulation disorder ([address] Difficult intubation, Endocarditis, GERD (gastroesophageal reflux disease), Heart failure ([address] Liver disease, Malignant hyperthermia due to anesthesia, Nausea & vomiting, Nicotine vapor product user, Non-nicotine vapor product user, Pseudocholinesterase deficiency, Psychiatric disorder, PUD (peptic ulcer disease), Rheumatic fever, Seizures ([address] Sleep apnea, Stroke ([address] Thromboembolus ([address] or Thyroid disease. Mr. Josemanuel Estevez  has a past surgical history that includes hx back surgery (1989); hx hip replacement (Left, 3/1/12); hx cataract removal (Bilateral); hx colonoscopy; ir esophagoscopy rigid/fluoro; and hx afib ablation (2014).   Social History/Living Environment:   Home Environment: Private residence  # Steps to Enter: 1  Rails to Enter: No  One/Two Story Residence: One story  Living Alone: No  Support Systems: Spouse/Significant Other/Partner  Patient Expects to be Discharged to[employer] residence  Current DME Used/Available at Home: Tramaine Campa, rollator, Tramaine Campa, vasiliy, Paulinalionel Gale, straight, [address] [facility], [address] [facility] chair, Commode, bedside, Wheelchair  Tub or Shower Type: Tub/Shower combination  Work/Activity:  retired  Dominant Side:  RIGHT  Current Medications:  See Pre-assessment nursing note   Number of Personal Factors/Comorbidities that affect the Plan of Care: 0: LOW COMPLEXITY   EXAMINATION:   ADLs (Current Functional Status):   Ambulation:  [x] Independent  [] Walk Indoors Only  [] Walk Outdoors  [] Use Assistive Device  [] Use Wheelchair Only Dressing:  [x] Independent  Requires Assistance from Someone for:  [] Sock/Shoes  [] Pants  [] Everything   Bathing/Showering:   [x] Independent  [] Requires Assistance from Someone  [] [address]:  [x] Routine house and yard work  [] Light Housework Only  [] None   Observation/Orthostatic Postural Assessment:       ROM/Flexibility:   AROM: Within functional limits(R knee 0-110; L knee 0-120)                           Strength:   Strength: Generally decreased, functional                  Functional Mobility:         Stand to Sit: Independent, Additional time  Sit to Stand: Independent, Additional time  Distance (ft): 500 Feet (ft)  Ambulation - Level of Assistance: Independent  Speed/Brittney: Slow  Stance: Right decreased  Gait Abnormalities: Antalgic          Balance:    Sitting: Intact  Standing: Intact   Body Structures Involved:  1. Bones  2. Joints  3. Muscles Body Functions Affected:  1. Neuromusculoskeletal  2. Movement Related Activities and Participation Affected:  1. General Tasks and Demands  2. Mobility   Number of elements that affect the Plan of Care: 3: MODERATE COMPLEXITY   CLINICAL PRESENTATION:   Presentation: Stable and uncomplicated: LOW COMPLEXITY   CLINICAL DECISION MAKING:   Outcome Measure:    Tool Used: Lower Extremity Functional Scale (LEFS)  Score:  Initial: 28/80 Most Recent: X/80 (Date: -- )   Interpretation of Score: 20 questions each scored on a 5 point scale with 0 representing \"extreme difficulty or unable to perform\" and 4 representing \"no difficulty\". The lower the score, the greater the functional disability. 80/80 represents no disability. Minimal detectable change is 9 points. Medical Necessity:   · Mr. Tanya Ortiz is expected to optimize his lower extremity strength and ROM in preparation for joint replacement surgery. Reason for Services/Other Comments:  · Achieve baseline assesment of musculoskeletal system, functional mobility and home environment. , educate in PT HEP in preparation for surgery, educate in hospital plan of care. Use of outcome tool(s) and clinical judgement create a POC that gives a: Clear prediction of patient's progress: LOW COMPLEXITY   TREATMENT:   Treatment/Session Assessment:  Patient was instructed in PT- HEP to increase strength and ROM in LEs. Answered all questions. · Post session pain:  5  · Compliance with Program/Exercises: anticipate compliance.   Total Treatment Duration:  PT Patient Time In/Time Out  Time In: 1230  Time Out: [address]

## 2019-12-23 NOTE — PROGRESS NOTES
SW met with pt in Prehab to discuss Right TKA scheduled for 1/14/20. Pt states he had a STEPHANIE in 2011. Pt plans to return home with his wife and HHPT. Pt resides in [place] and chose [facility]. [facility] referral completed. Pt reports he has a RW, BSC and cane for home use. No additional DME anticipated. No additional needs or questions identified at this time. Anna Fink    The Plan for Transition of Care is related to the following treatment goals: [place] at home. The Patient  was provided with a choice of provider and agrees   with the discharge plan. [x] Yes [] No    Freedom of choice list was provided with basic dialogue that supports the patient's individualized plan of care/goals, treatment preferences and shares the quality data associated with the providers.  [x] Yes [] No

## 2020-01-06 ENCOUNTER — HOSPITAL ENCOUNTER (OUTPATIENT)
Dept: GENERAL RADIOLOGY | Age: 80
Discharge: HOME | End: 2020-01-06
Attending: INTERNAL MEDICINE
Payer: MEDICARE

## 2020-01-06 DIAGNOSIS — R19.5 HEME POSITIVE STOOL: ICD-10-CM

## 2020-01-06 DIAGNOSIS — D50.9 IRON DEFICIENCY ANEMIA, UNSPECIFIED: ICD-10-CM

## 2020-01-06 PROCEDURE — 74011000255 HC RX REV CODE- 255: Performed by: INTERNAL MEDICINE

## 2020-01-06 PROCEDURE — 74250 X-RAY XM SM INT 1CNTRST STD: CPT

## 2020-01-06 RX ADMIN — BARIUM SULFATE 600 ML: 240 SUSPENSION ORAL at 10:10

## 2020-01-06 NOTE — ADVANCED PRACTICE NURSE
Total Joint Surgery Preoperative Chart Review      Patient ID:  Tripp Jackson  [id number]  49 y.o.  1940  Surgeon: Dr. Romero Galvan  Date of Surgery: 1/14/2020  Procedure: Total Right Knee Arthroplasty  Primary Care Physician: Avelino Aggarwal -[phone]  Specialty Physician(s):      Subjective:   Tripp Jackson is a 70 y.o. WHITE OR  male who presents for preoperative evaluation for Total Right Knee arthroplasty. This is a preoperative chart review note based on data collected by the nurse at the surgical Pre-Assessment visit. Past Medical History:   Diagnosis Date    Anemia     Arthritis     Atrial fibrillation ([facility]     Predominantly atrial flutter; flecainide 2014; atrial flutter ablation 2/11/16; unclear extent of a fib    Calculus of kidney     Conjunctivitis unspecified     Diabetes mellitus type II, uncontrolled ([facility]     BS run between(100-170). Low BS s/sx:weak, shakes, =bs below 60.    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    Type 2 diabetes mellitus ([facility] 2004    checks SQBS every 1-2 weeks; HGB A1C 12/23/19:  7.8    Type I (juvenile type) diabetes mellitus without mention of complication, not stated as uncontrolled     PATIENT DENIES    Unspecified hyperplasia of prostate without urinary obstruction and other lower urinary tract symptoms (LUTS)       Past Surgical History:   Procedure Laterality Date    HX AFIB ABLATION  2014    HX BACK SURGERY  1989    bulging disc repair in lumbar region    HX CATARACT REMOVAL Bilateral     HX COLONOSCOPY      HX HIP REPLACEMENT Left 3/1/12    IR ESOPHAGOSCOPY RIGID/FLUORO       Family History Problem Relation Age of Onset    Alzheimer Mother     Heart Failure Father         congestive    Heart Disease Father     Alzheimer Brother     Heart Disease Brother     Diabetes Sister     Malignant Hyperthermia Neg Hx     Pseudocholinesterase Deficiency Neg Hx     Delayed Awakening Neg Hx     Post-op Nausea/Vomiting Neg Hx     Emergence Delirium Neg Hx     Post-op Cognitive Dysfunction Neg Hx     Other Neg Hx     Cancer Neg Hx       Social History     Tobacco Use    Smoking status: Never Smoker    Smokeless tobacco: Never Used   Substance Use Topics    Alcohol use: No       Prior to Admission medications    Medication Sig Start Date End Date Taking? Authorizing Provider   famotidine (PEPCID) 20 mg tablet Take 20 mg by mouth two (2) times a day. Take / use AM day of surgery  per anesthesia protocols. Indications: gastroesophageal reflux disease   Yes Provider, Historical   ferrous sulfate (IRON) 325 mg (65 mg iron) EC tablet Take 1 Tab by mouth daily. Patient taking differently: Take 325 mg by mouth daily (with dinner). Indications: anemia from inadequate iron 10/29/19  Yes Dalton Milligan MD   tamsulosin (FLOMAX) 0.4 mg capsule TAKE 1 CAPSULE BY MOUTH ONCE DAILY  Patient taking differently: Take 0.4 mg by mouth nightly. Take / use AM day of surgery  per anesthesia protocols. Indications: enlarged prostate with urination problem 10/8/19  Yes Dalton Milligan MD   dilTIAZem CD (CARDIZEM CD) 120 mg ER capsule Take 1 Cap by mouth daily. Patient taking differently: Take 120 mg by mouth nightly. Indications: Ventricular Rate Control in Atrial Fibrillation 8/29/19  Yes Elmore Bosworth, MD   insulin regular (NOVOLIN R REGULAR U-100 INSULN) 100 unit/mL injection INJECT SUBCUTANEOUSLY 14 UNITS WITH EACH MEAL AS DIRECTED  Patient taking differently: 14 Units by SubCUTAneous route once.  INJECT SUBCUTANEOUSLY 14 UNITS WITH EACH MEAL AS DIRECTED  Indications: type 2 diabetes mellitus 8/22/19  Yes Fritz Peters MD   enalapril (VASOTEC) 10 mg tablet TAKE 1 TABLET BY MOUTH DAILY  Patient taking differently: Take 10 mg by mouth nightly. TAKE 1 TABLET BY MOUTH DAILY; Indications: high blood pressure 7/25/19  Yes Latonia Gonzalze MD   XARELTO 20 mg tab tablet TAKE 1 TABLET BY MOUTH ONCE DAILY  Patient taking differently: Take 20 mg by mouth daily (with dinner). Hold for 72 hours prior to surgery~per anesthesia guidelines/cleared with cardiologist  Indications: Treatment to Prevent Blood Clots in Chronic Atrial Fibrillation 7/17/19  Yes Latonia Gonzalez MD   Insulin Needles, Disposable, (COMFORT EZ PEN NEEDLES) 31 gauge x 5/16\" ndle Use five times per day for insulin injection 7/5/19  Yes Fritz Peters MD   meclizine (ANTIVERT) 25 mg tablet Take 1 Tab by mouth three (3) times daily as needed for Dizziness. 4/30/19  Yes Fritz Peters MD   insulin NPH (NOVOLIN N NPH U-100 INSULIN) 100 unit/mL injection Take 10 units bid with meals  Patient taking differently: 10 Units by SubCUTAneous route two (2) times daily (with meals). Take 10 units bid with meals  Indications: type 2 diabetes mellitus 4/12/19  Yes Fritz Peters MD   flecainide (TAMBOCOR) 100 mg tablet Take 1 Tab by mouth two (2) times a day. Patient taking differently: Take 100 mg by mouth two (2) times a day. Take / use AM day of surgery  per anesthesia protocols. Indications: Sudden Recurrence/Intensity of Rapid Regular Heartbeats 3/7/19  Yes Latonia Gonzalez MD   diclofenac (VOLTAREN) 1 % gel Apply  to affected area as needed. 8/23/18  Yes Provider, Historical   Blood Sugar Diagnostic, Drum (ACCU-CHEK COMPACT TEST) strp 1 Strip by IntraLESional route two (2) times a day.  6/15/17  Yes Fritz Peters MD   glucose blood VI test strips (ACCU-CHEK COMPACT TEST) strip Check blood sugars twice daily 9/11/15  Yes Miley MAYO MD   Insulin Syringe-Needle U-100 (BD INSULIN SYRINGE ULT-FINE II) 1/2 mL 31 x 5/16\" syrg by Does Not Apply route. Yes Provider, Historical     No Known Allergies       Objective:     Physical Exam:   No data found. ECG:    EKG Results     None          Data Review:   Labs:   Results for Rashmi Villafana (MRN [id number]) as of 1/6/2020 13:09   Ref. Range 12/23/2019 12:20   Sodium Latest Ref Range: 136 - 145 mmol/L 140   Potassium Latest Ref Range: 3.5 - 5.1 mmol/L 4.1   Chloride Latest Ref Range: 98 - 107 mmol/L 107   CO2 Latest Ref Range: 21 - 32 mmol/L 27   Anion gap Latest Ref Range: 7 - 16 mmol/L 6 (L)   Glucose Latest Ref Range: 65 - 100 mg/dL 174 (H)   BUN Latest Ref Range: 8 - 23 MG/DL 22   Creatinine Latest Ref Range: 0.8 - 1.5 MG/DL 1.19   Calcium Latest Ref Range: 8.3 - 10.4 MG/DL 9.2   GFR est non-AA Latest Ref Range: >60 ml/min/1.73m2 >60   GFR est AA Latest Ref Range: >60 ml/min/1.73m2 >60   Hemoglobin A1c, (calculated) Latest Units: % 7.8   Est. average glucose Latest Units: mg/dL 177         Problem List:  )  Patient Active Problem List   Diagnosis Code    Diabetes ([facility] E11.9    Hypertension I10    Arthritis M19.90    Atrial fibrillation, paroxysmal (HCC) I48.91    Osteoarthritis M19.90    Benign prostatic disease N42.9    Fatigue R53.83    Obesity E66.9    Anticoagulant long-term use Z79.01    Dizziness R42    Adenomatous polyp of ascending colon D12.2    Type 2 diabetes with nephropathy (HCC) E11.21       Total Joint Surgery Pre-Assessment Recommendations:           He/she is a moderate risk for sleep apnea but is not interested in additional work up at this time. Will initiate perioperative SARAH precautions. Recommend continuous saturation monitoring hours of sleep, during hospitalization.           Signed By: SOLEDAD Hannon    January 6, 2020

## 2020-01-13 ENCOUNTER — ANESTHESIA EVENT (OUTPATIENT)
Dept: SURGERY | Age: 80
DRG: 470 | End: 2020-01-13
Payer: MEDICARE

## 2020-01-13 NOTE — H&P
H&P    Patient ID:  Kathia Esquivel  [id number]  20 y.o.  1940  Surgeon:  Adelina Moreno MD  Date of Surgery: * No surgery date entered *  Procedure: Right Knee Total Arthroplasty  Primary Care Physician: Matthew Land MD        Subjective:  Kathia Esquivel is a 70 y.o. WHITE [place]  male who presents with Right Knee pain. He has history of Right Knee pain for several months. Symptoms worse with walking long distances and relieved with rest. Conservative treatment consisting of  activity modification and injections have not helped. The patient lives with their family. The patients goal after surgery is improved pain and function. Past Medical History:   Diagnosis Date    Anemia     Arthritis     Atrial fibrillation ([address]     Predominantly atrial flutter; flecainide 2014; atrial flutter ablation 2/11/16; unclear extent of a fib    Calculus of kidney     Conjunctivitis unspecified     Diabetes mellitus type II, uncontrolled ([address]     BS run between(100-170). Low BS s/sx:weak, shakes, =bs below 60.    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    Type 2 diabetes mellitus ([address] 2004    checks SQBS every 1-2 weeks; HGB A1C 12/23/19:  7.8    Type I (juvenile type) diabetes mellitus without mention of complication, not stated as uncontrolled     PATIENT DENIES    Unspecified hyperplasia of prostate without urinary obstruction and other lower urinary tract symptoms (LUTS)       Past Surgical History:   Procedure Laterality Date    HX AFIB ABLATION  2014    HX BACK SURGERY  1989    bulging disc repair in lumbar region    HX CATARACT REMOVAL Bilateral     HX COLONOSCOPY      HX HIP REPLACEMENT Left 3/1/12    IR ESOPHAGOSCOPY RIGID/FLUORO       Family History   Problem Relation Age of Onset    Alzheimer Mother     Heart Failure Father         congestive    Heart Disease Father     Alzheimer Brother     Heart Disease Brother     Diabetes Sister     Malignant Hyperthermia Neg Hx     Pseudocholinesterase Deficiency Neg Hx     Delayed Awakening Neg Hx     Post-op Nausea/Vomiting Neg Hx     Emergence Delirium Neg Hx     Post-op Cognitive Dysfunction Neg Hx     Other Neg Hx     Cancer Neg Hx       Social History     Tobacco Use    Smoking status: Never Smoker    Smokeless tobacco: Never Used   Substance Use Topics    Alcohol use: No       Prior to Admission medications    Medication Sig Start Date End Date Taking? Authorizing Provider   famotidine (PEPCID) 20 mg tablet Take 20 mg by mouth two (2) times a day. Take / use AM day of surgery  per anesthesia protocols. Indications: gastroesophageal reflux disease    Provider, Historical   ferrous sulfate (IRON) 325 mg (65 mg iron) EC tablet Take 1 Tab by mouth daily. Patient taking differently: Take 325 mg by mouth daily (with dinner). Indications: anemia from inadequate iron 10/29/19   Yanelis Barrios MD   tamsulosin (FLOMAX) 0.4 mg capsule TAKE 1 CAPSULE BY MOUTH ONCE DAILY  Patient taking differently: Take 0.4 mg by mouth nightly. Take / use AM day of surgery  per anesthesia protocols. Indications: enlarged prostate with urination problem 10/8/19   Yanelis Barrios MD   dilTIAZem CD (CARDIZEM CD) 120 mg ER capsule Take 1 Cap by mouth daily. Patient taking differently: Take 120 mg by mouth nightly. Indications: Ventricular Rate Control in Atrial Fibrillation 8/29/19   Panfilo Tobias MD   insulin regular (NOVOLIN R REGULAR U-100 INSULN) 100 unit/mL injection INJECT SUBCUTANEOUSLY 14 UNITS WITH EACH MEAL AS DIRECTED  Patient taking differently: 14 Units by SubCUTAneous route once.  INJECT SUBCUTANEOUSLY 14 UNITS WITH EACH MEAL AS DIRECTED  Indications: type 2 diabetes mellitus 8/22/19   Mychal Morrison MD   enalapril (VASOTEC) 10 mg tablet TAKE 1 TABLET BY MOUTH DAILY  Patient taking differently: Take 10 mg by mouth nightly. TAKE 1 TABLET BY MOUTH DAILY; Indications: high blood pressure 7/25/19   Eneida Garcia MD   XARELTO 20 mg tab tablet TAKE 1 TABLET BY MOUTH ONCE DAILY  Patient taking differently: Take 20 mg by mouth daily (with dinner). Hold for 72 hours prior to surgery~per anesthesia guidelines/cleared with cardiologist  Indications: Treatment to Prevent Blood Clots in Chronic Atrial Fibrillation 7/17/19   Eneida Garcia MD   Insulin Needles, Disposable, (COMFORT EZ PEN NEEDLES) 31 gauge x 5/16\" ndle Use five times per day for insulin injection 7/5/19   Mychal Morrison MD   meclizine (ANTIVERT) 25 mg tablet Take 1 Tab by mouth three (3) times daily as needed for Dizziness. 4/30/19   Mychal Morrison MD   insulin NPH (NOVOLIN N NPH U-100 INSULIN) 100 unit/mL injection Take 10 units bid with meals  Patient taking differently: 10 Units by SubCUTAneous route two (2) times daily (with meals). Take 10 units bid with meals  Indications: type 2 diabetes mellitus 4/12/19   Mychal Morrison MD   flecainide (TAMBOCOR) 100 mg tablet Take 1 Tab by mouth two (2) times a day. Patient taking differently: Take 100 mg by mouth two (2) times a day. Take / use AM day of surgery  per anesthesia protocols. Indications: Sudden Recurrence/Intensity of Rapid Regular Heartbeats 3/7/19   Eneida Garcia MD   diclofenac (VOLTAREN) 1 % gel Apply  to affected area as needed. 8/23/18   Provider, Historical   Blood Sugar Diagnostic, Drum (ACCU-CHEK COMPACT TEST) strp 1 Strip by IntraLESional route two (2) times a day.  6/15/17   yMchal Morrison MD   glucose blood VI test strips (ACCU-CHEK COMPACT TEST) strip Check blood sugars twice daily 9/11/15   Mychal Morrison MD   Insulin Syringe-Needle U-100 (BD INSULIN SYRINGE ULT-FINE II) 1/2 mL 31 x 5/16\" syrg by Does Not Apply route. Provider, Historical     No Known Allergies     REVIEW OF SYSTEMS:  CONSTITUTIONAL: Denies fever, decreased appetite, weight loss/gain, night sweats or fatigue. HEENT: Denies vision or hearing changes. denies glasses. denies hearing aids. CARDIAC: Denies CP, palpitations, rheumatic fever, murmur, peripheral edema, carotid artery disease or syncopal episodes. RESPIRATORY: Denies dyspnea on exertion, asthma, COPD or orthopnea. GI: Denies GERD, history of GI bleed or melena, PUD, hepatitis or cirrhosis. : Denies dysuria, hematuria. denies BPH symptoms. HEMATOLOGIC: Denies anemia or blood disorders. ENDOCRINE: Denies thyroid disease. MUSCULOSKELETAL: See HPI. NEUROLOGIC: Denies seizure, peripheral neuropathy or memory loss. PSYCH: Denies depression, anxiety or insomnia. SKIN: Denies rash or open sores. Objective:    PHYSICAL EXAM  GENERAL:   Patient Vitals for the past 8 hrs:   Height Weight   01/13/20 0555 5' 10\" (1.778 m) 105.7 kg (233 lb)    EYES: PERRL. EOM intact. MOUTH:Teeth and Gums normal. NECK: Full ROM. Trachea midline. No thyromegaly or JVD. CARDIOVASCULAR: Regular rate and rhythm. No murmur or gallops. No carotid bruits. Peripheral pulses: radial 2 +, PT 2+, DP 2+ bilaterally. LUNGS: CTA bilaterally. No wheezes, rhonchi or rales. GI: positive BS. Abdomen nontender. NEUROLOGIC: Alert and oriented x 3. Bilateral equal strong had grasp and bilateral equal strong plantar flexion and dorsiflexion. GAIT: abnormal MUSCULOSKELETAL: ROM: full with pain. Tenderness: over the medial and lateral joint lines. Crepitus: present. SKIN: No rash, bruising, swelling, redness or warmth. Labs:  No results found for this or any previous visit (from the past 24 hour(s)). Xray Right Knee: joint space narrowing    Assessment:  Advanced Right Knee Osteoarthritis. Total Right Knee Arthroplasty Indicated.   Patient Active Problem List   Diagnosis Code    Diabetes ([address] E11.9    Hypertension I10    Arthritis M19.90    Atrial fibrillation, paroxysmal ([facility] I48.91    Osteoarthritis M19.90    Benign prostatic disease N42.9    Fatigue R53.83    Obesity E66.9    Anticoagulant long-term use Z79.01    Dizziness R42    Adenomatous polyp of ascending colon D12.2    Type 2 diabetes with nephropathy ([facility] E11.21       Plan:  I have advised the patient of the risks and consequences, including possible complications of performing total joint replacement, as well as not doing this operation. The patient had the opportunity to ask questions and have them answered to their satisfaction.      Signed:  KRISTINA Crandall 1/13/2020

## 2020-01-14 ENCOUNTER — HOSPITAL ENCOUNTER (INPATIENT)
Age: 80
LOS: 1 days | Discharge: HOME HEALTH | DRG: 470 | End: 2020-01-15
Attending: ORTHOPAEDIC SURGERY | Admitting: ORTHOPAEDIC SURGERY
Payer: MEDICARE

## 2020-01-14 ENCOUNTER — ANESTHESIA (OUTPATIENT)
Dept: SURGERY | Age: 80
DRG: 470 | End: 2020-01-14
Payer: MEDICARE

## 2020-01-14 DIAGNOSIS — Z96.651 STATUS POST TOTAL RIGHT KNEE REPLACEMENT: Primary | ICD-10-CM

## 2020-01-14 PROBLEM — M17.11 ARTHRITIS OF KNEE, RIGHT: Status: ACTIVE | Noted: 2020-01-14

## 2020-01-14 LAB
GLUCOSE BLD STRIP.AUTO-MCNC: 345 MG/DL (ref 65–100)
GLUCOSE BLD STRIP.AUTO-MCNC: 98 MG/DL (ref 65–100)
HGB BLD-MCNC: 11.9 G/DL (ref 13.6–17.2)
INR BLD: 1.1 (ref 0.9–1.2)
PT BLD: 12.6 SECS (ref 9.6–11.6)

## 2020-01-14 PROCEDURE — 74011250637 HC RX REV CODE- 250/637: Performed by: ANESTHESIOLOGY

## 2020-01-14 PROCEDURE — 77030040922 HC BLNKT HYPOTHRM STRY -A: Performed by: ANESTHESIOLOGY

## 2020-01-14 PROCEDURE — 97161 PT EVAL LOW COMPLEX 20 MIN: CPT

## 2020-01-14 PROCEDURE — 36415 COLL VENOUS BLD VENIPUNCTURE: CPT

## 2020-01-14 PROCEDURE — 76010000162 HC OR TIME 1.5 TO 2 HR INTENSV-TIER 1: Performed by: ORTHOPAEDIC SURGERY

## 2020-01-14 PROCEDURE — 77030013819 HC MX SYS CEM ZIMM -B: Performed by: ORTHOPAEDIC SURGERY

## 2020-01-14 PROCEDURE — 76210000016 HC OR PH I REC 1 TO 1.5 HR: Performed by: ORTHOPAEDIC SURGERY

## 2020-01-14 PROCEDURE — 82962 GLUCOSE BLOOD TEST: CPT

## 2020-01-14 PROCEDURE — 77030007880 HC KT SPN EPDRL BBMI -B: Performed by: ANESTHESIOLOGY

## 2020-01-14 PROCEDURE — 77030031139 HC SUT VCRL2 J&J -A: Performed by: ORTHOPAEDIC SURGERY

## 2020-01-14 PROCEDURE — 77030013708 HC HNDPC SUC IRR PULS STRY –B: Performed by: ORTHOPAEDIC SURGERY

## 2020-01-14 PROCEDURE — 97165 OT EVAL LOW COMPLEX 30 MIN: CPT

## 2020-01-14 PROCEDURE — 65270000029 HC RM PRIVATE

## 2020-01-14 PROCEDURE — 74011250636 HC RX REV CODE- 250/636: Performed by: ORTHOPAEDIC SURGERY

## 2020-01-14 PROCEDURE — 77030016544 HC BLD SAW RECIP1 STRY -B: Performed by: ORTHOPAEDIC SURGERY

## 2020-01-14 PROCEDURE — 77030033067 HC SUT PDO STRATFX SPIR J&J -B: Performed by: ORTHOPAEDIC SURGERY

## 2020-01-14 PROCEDURE — 76942 ECHO GUIDE FOR BIOPSY: CPT | Performed by: ORTHOPAEDIC SURGERY

## 2020-01-14 PROCEDURE — 94760 N-INVAS EAR/PLS OXIMETRY 1: CPT

## 2020-01-14 PROCEDURE — 77030012935 HC DRSG AQUACEL BMS -B: Performed by: ORTHOPAEDIC SURGERY

## 2020-01-14 PROCEDURE — 0SRC0J9 REPLACEMENT OF RIGHT KNEE JOINT WITH SYNTHETIC SUBSTITUTE, CEMENTED, OPEN APPROACH: ICD-10-PCS | Performed by: ORTHOPAEDIC SURGERY

## 2020-01-14 PROCEDURE — 77030020256 HC SOL INJ NACL 0.9%  500ML: Performed by: ORTHOPAEDIC SURGERY

## 2020-01-14 PROCEDURE — 74011000250 HC RX REV CODE- 250: Performed by: ORTHOPAEDIC SURGERY

## 2020-01-14 PROCEDURE — 74011250637 HC RX REV CODE- 250/637: Performed by: ORTHOPAEDIC SURGERY

## 2020-01-14 PROCEDURE — 76010010054 HC POST OP PAIN BLOCK: Performed by: ORTHOPAEDIC SURGERY

## 2020-01-14 PROCEDURE — 85610 PROTHROMBIN TIME: CPT

## 2020-01-14 PROCEDURE — 74011636637 HC RX REV CODE- 636/637: Performed by: ORTHOPAEDIC SURGERY

## 2020-01-14 PROCEDURE — 97535 SELF CARE MNGMENT TRAINING: CPT

## 2020-01-14 PROCEDURE — 77030018836 HC SOL IRR NACL ICUM -A: Performed by: ORTHOPAEDIC SURGERY

## 2020-01-14 PROCEDURE — 85018 HEMOGLOBIN: CPT

## 2020-01-14 PROCEDURE — 77030003665 HC NDL SPN BBMI -A: Performed by: NURSE ANESTHETIST, CERTIFIED REGISTERED

## 2020-01-14 PROCEDURE — C1713 ANCHOR/SCREW BN/BN,TIS/BN: HCPCS | Performed by: ORTHOPAEDIC SURGERY

## 2020-01-14 PROCEDURE — 74011250636 HC RX REV CODE- 250/636: Performed by: ANESTHESIOLOGY

## 2020-01-14 PROCEDURE — 76060000034 HC ANESTHESIA 1.5 TO 2 HR: Performed by: ORTHOPAEDIC SURGERY

## 2020-01-14 PROCEDURE — C1776 JOINT DEVICE (IMPLANTABLE): HCPCS | Performed by: ORTHOPAEDIC SURGERY

## 2020-01-14 PROCEDURE — 94762 N-INVAS EAR/PLS OXIMTRY CONT: CPT

## 2020-01-14 PROCEDURE — 77030003602 HC NDL NRV BLK BBMI -B: Performed by: NURSE ANESTHETIST, CERTIFIED REGISTERED

## 2020-01-14 PROCEDURE — 74011000258 HC RX REV CODE- 258: Performed by: ORTHOPAEDIC SURGERY

## 2020-01-14 PROCEDURE — 77030037714 HC CLOSR DEV INCIS ZIP STRY -C: Performed by: ORTHOPAEDIC SURGERY

## 2020-01-14 PROCEDURE — 74011000250 HC RX REV CODE- 250: Performed by: NURSE ANESTHETIST, CERTIFIED REGISTERED

## 2020-01-14 PROCEDURE — 77030006835 HC BLD SAW SAG STRY -B: Performed by: ORTHOPAEDIC SURGERY

## 2020-01-14 PROCEDURE — 77030019557 HC ELECTRD VES SEAL MEDT -F: Performed by: ORTHOPAEDIC SURGERY

## 2020-01-14 PROCEDURE — 77010033678 HC OXYGEN DAILY

## 2020-01-14 PROCEDURE — 74011250636 HC RX REV CODE- 250/636: Performed by: NURSE ANESTHETIST, CERTIFIED REGISTERED

## 2020-01-14 DEVICE — BASEPLATE TIB SZ 8 CEM ROT PLATFRM KNEE SYS S + ATTUNE: Type: IMPLANTABLE DEVICE | Site: KNEE | Status: FUNCTIONAL

## 2020-01-14 DEVICE — CEMENT BNE SIMPLEX W/O GENT -- PK/10 ONLY: Type: IMPLANTABLE DEVICE | Site: KNEE | Status: FUNCTIONAL

## 2020-01-14 DEVICE — COMPONENT FEM SZ 8 R KNEE POST STBL CEM ATTUNE: Type: IMPLANTABLE DEVICE | Site: KNEE | Status: FUNCTIONAL

## 2020-01-14 DEVICE — INSERT TIB SZ 8 THK8MM KNEE POST STBL ROT PLATFRM ATTUNE: Type: IMPLANTABLE DEVICE | Site: KNEE | Status: FUNCTIONAL

## 2020-01-14 RX ORDER — ACETAMINOPHEN 500 MG
1000 TABLET ORAL EVERY 6 HOURS
Status: DISCONTINUED | OUTPATIENT
Start: 2020-01-15 | End: 2020-01-15 | Stop reason: HOSPADM

## 2020-01-14 RX ORDER — DILTIAZEM HYDROCHLORIDE 120 MG/1
120 CAPSULE, COATED, EXTENDED RELEASE ORAL
Status: DISCONTINUED | OUTPATIENT
Start: 2020-01-14 | End: 2020-01-15 | Stop reason: HOSPADM

## 2020-01-14 RX ORDER — SODIUM CHLORIDE, SODIUM LACTATE, POTASSIUM CHLORIDE, CALCIUM CHLORIDE 600; 310; 30; 20 MG/100ML; MG/100ML; MG/100ML; MG/100ML
100 INJECTION, SOLUTION INTRAVENOUS CONTINUOUS
Status: DISCONTINUED | OUTPATIENT
Start: 2020-01-14 | End: 2020-01-14 | Stop reason: HOSPADM

## 2020-01-14 RX ORDER — ACETAMINOPHEN 10 MG/ML
1000 INJECTION, SOLUTION INTRAVENOUS ONCE
Status: COMPLETED | OUTPATIENT
Start: 2020-01-14 | End: 2020-01-14

## 2020-01-14 RX ORDER — HYDROMORPHONE HYDROCHLORIDE 1 MG/ML
1 INJECTION, SOLUTION INTRAMUSCULAR; INTRAVENOUS; SUBCUTANEOUS
Status: DISCONTINUED | OUTPATIENT
Start: 2020-01-14 | End: 2020-01-15 | Stop reason: HOSPADM

## 2020-01-14 RX ORDER — ASPIRIN 81 MG/1
81 TABLET ORAL EVERY 12 HOURS
Status: DISCONTINUED | OUTPATIENT
Start: 2020-01-14 | End: 2020-01-15 | Stop reason: HOSPADM

## 2020-01-14 RX ORDER — EPHEDRINE SULFATE/0.9% NACL/PF 50 MG/5 ML
SYRINGE (ML) INTRAVENOUS AS NEEDED
Status: DISCONTINUED | OUTPATIENT
Start: 2020-01-14 | End: 2020-01-14 | Stop reason: HOSPADM

## 2020-01-14 RX ORDER — LISINOPRIL 5 MG/1
10 TABLET ORAL DAILY
Status: DISCONTINUED | OUTPATIENT
Start: 2020-01-15 | End: 2020-01-15 | Stop reason: HOSPADM

## 2020-01-14 RX ORDER — MECLIZINE HYDROCHLORIDE 25 MG/1
25 TABLET ORAL
Status: DISCONTINUED | OUTPATIENT
Start: 2020-01-14 | End: 2020-01-14

## 2020-01-14 RX ORDER — DEXAMETHASONE SODIUM PHOSPHATE 4 MG/ML
INJECTION, SOLUTION INTRA-ARTICULAR; INTRALESIONAL; INTRAMUSCULAR; INTRAVENOUS; SOFT TISSUE AS NEEDED
Status: DISCONTINUED | OUTPATIENT
Start: 2020-01-14 | End: 2020-01-14 | Stop reason: HOSPADM

## 2020-01-14 RX ORDER — ACETAMINOPHEN 500 MG
1000 TABLET ORAL ONCE
Status: COMPLETED | OUTPATIENT
Start: 2020-01-14 | End: 2020-01-14

## 2020-01-14 RX ORDER — DEXAMETHASONE SODIUM PHOSPHATE 100 MG/10ML
10 INJECTION INTRAMUSCULAR; INTRAVENOUS ONCE
Status: DISCONTINUED | OUTPATIENT
Start: 2020-01-15 | End: 2020-01-15 | Stop reason: HOSPADM

## 2020-01-14 RX ORDER — LIDOCAINE HYDROCHLORIDE 10 MG/ML
0.1 INJECTION INFILTRATION; PERINEURAL AS NEEDED
Status: DISCONTINUED | OUTPATIENT
Start: 2020-01-14 | End: 2020-01-14 | Stop reason: HOSPADM

## 2020-01-14 RX ORDER — CELECOXIB 200 MG/1
200 CAPSULE ORAL ONCE
Status: COMPLETED | OUTPATIENT
Start: 2020-01-14 | End: 2020-01-14

## 2020-01-14 RX ORDER — SODIUM CHLORIDE, SODIUM LACTATE, POTASSIUM CHLORIDE, CALCIUM CHLORIDE 600; 310; 30; 20 MG/100ML; MG/100ML; MG/100ML; MG/100ML
75 INJECTION, SOLUTION INTRAVENOUS CONTINUOUS
Status: DISCONTINUED | OUTPATIENT
Start: 2020-01-14 | End: 2020-01-14 | Stop reason: HOSPADM

## 2020-01-14 RX ORDER — TRANEXAMIC ACID 100 MG/ML
INJECTION, SOLUTION INTRAVENOUS AS NEEDED
Status: DISCONTINUED | OUTPATIENT
Start: 2020-01-14 | End: 2020-01-14 | Stop reason: HOSPADM

## 2020-01-14 RX ORDER — CELECOXIB 200 MG/1
200 CAPSULE ORAL EVERY 12 HOURS
Status: DISCONTINUED | OUTPATIENT
Start: 2020-01-14 | End: 2020-01-15

## 2020-01-14 RX ORDER — FAMOTIDINE 20 MG/1
20 TABLET, FILM COATED ORAL 2 TIMES DAILY
Status: DISCONTINUED | OUTPATIENT
Start: 2020-01-14 | End: 2020-01-15 | Stop reason: HOSPADM

## 2020-01-14 RX ORDER — PROPOFOL 10 MG/ML
INJECTION, EMULSION INTRAVENOUS
Status: DISCONTINUED | OUTPATIENT
Start: 2020-01-14 | End: 2020-01-14 | Stop reason: HOSPADM

## 2020-01-14 RX ORDER — SODIUM CHLORIDE 0.9 % (FLUSH) 0.9 %
5-40 SYRINGE (ML) INJECTION EVERY 8 HOURS
Status: DISCONTINUED | OUTPATIENT
Start: 2020-01-14 | End: 2020-01-15 | Stop reason: HOSPADM

## 2020-01-14 RX ORDER — AMOXICILLIN 250 MG
2 CAPSULE ORAL DAILY
Status: DISCONTINUED | OUTPATIENT
Start: 2020-01-15 | End: 2020-01-15 | Stop reason: HOSPADM

## 2020-01-14 RX ORDER — ROPIVACAINE HYDROCHLORIDE 2 MG/ML
INJECTION, SOLUTION EPIDURAL; INFILTRATION; PERINEURAL AS NEEDED
Status: DISCONTINUED | OUTPATIENT
Start: 2020-01-14 | End: 2020-01-14 | Stop reason: HOSPADM

## 2020-01-14 RX ORDER — CEFAZOLIN SODIUM/WATER 2 G/20 ML
2 SYRINGE (ML) INTRAVENOUS EVERY 8 HOURS
Status: COMPLETED | OUTPATIENT
Start: 2020-01-14 | End: 2020-01-15

## 2020-01-14 RX ORDER — SODIUM CHLORIDE 0.9 % (FLUSH) 0.9 %
5-40 SYRINGE (ML) INJECTION AS NEEDED
Status: DISCONTINUED | OUTPATIENT
Start: 2020-01-14 | End: 2020-01-15 | Stop reason: HOSPADM

## 2020-01-14 RX ORDER — DIPHENHYDRAMINE HCL 25 MG
25 CAPSULE ORAL
Status: DISCONTINUED | OUTPATIENT
Start: 2020-01-14 | End: 2020-01-15 | Stop reason: HOSPADM

## 2020-01-14 RX ORDER — KETOROLAC TROMETHAMINE 30 MG/ML
INJECTION, SOLUTION INTRAMUSCULAR; INTRAVENOUS AS NEEDED
Status: DISCONTINUED | OUTPATIENT
Start: 2020-01-14 | End: 2020-01-14 | Stop reason: HOSPADM

## 2020-01-14 RX ORDER — TAMSULOSIN HYDROCHLORIDE 0.4 MG/1
0.4 CAPSULE ORAL
Status: DISCONTINUED | OUTPATIENT
Start: 2020-01-14 | End: 2020-01-15 | Stop reason: HOSPADM

## 2020-01-14 RX ORDER — LANOLIN ALCOHOL/MO/W.PET/CERES
1 CREAM (GRAM) TOPICAL
Status: DISCONTINUED | OUTPATIENT
Start: 2020-01-15 | End: 2020-01-15 | Stop reason: HOSPADM

## 2020-01-14 RX ORDER — FLECAINIDE ACETATE 100 MG/1
100 TABLET ORAL 2 TIMES DAILY
Status: DISCONTINUED | OUTPATIENT
Start: 2020-01-14 | End: 2020-01-15 | Stop reason: HOSPADM

## 2020-01-14 RX ORDER — PROMETHAZINE HYDROCHLORIDE 25 MG/1
25 TABLET ORAL
Status: DISCONTINUED | OUTPATIENT
Start: 2020-01-14 | End: 2020-01-15 | Stop reason: HOSPADM

## 2020-01-14 RX ORDER — NALOXONE HYDROCHLORIDE 0.4 MG/ML
.2-.4 INJECTION, SOLUTION INTRAMUSCULAR; INTRAVENOUS; SUBCUTANEOUS
Status: DISCONTINUED | OUTPATIENT
Start: 2020-01-14 | End: 2020-01-15 | Stop reason: HOSPADM

## 2020-01-14 RX ORDER — NALOXONE HYDROCHLORIDE 0.4 MG/ML
0.2 INJECTION, SOLUTION INTRAMUSCULAR; INTRAVENOUS; SUBCUTANEOUS AS NEEDED
Status: DISCONTINUED | OUTPATIENT
Start: 2020-01-14 | End: 2020-01-14 | Stop reason: HOSPADM

## 2020-01-14 RX ORDER — FENTANYL CITRATE 50 UG/ML
100 INJECTION, SOLUTION INTRAMUSCULAR; INTRAVENOUS ONCE
Status: COMPLETED | OUTPATIENT
Start: 2020-01-14 | End: 2020-01-14

## 2020-01-14 RX ORDER — OXYCODONE HYDROCHLORIDE 5 MG/1
5 TABLET ORAL
Status: DISCONTINUED | OUTPATIENT
Start: 2020-01-14 | End: 2020-01-14 | Stop reason: HOSPADM

## 2020-01-14 RX ORDER — HYDROMORPHONE HYDROCHLORIDE 2 MG/ML
0.5 INJECTION, SOLUTION INTRAMUSCULAR; INTRAVENOUS; SUBCUTANEOUS
Status: DISCONTINUED | OUTPATIENT
Start: 2020-01-14 | End: 2020-01-14 | Stop reason: HOSPADM

## 2020-01-14 RX ORDER — SODIUM CHLORIDE 9 MG/ML
100 INJECTION, SOLUTION INTRAVENOUS CONTINUOUS
Status: DISCONTINUED | OUTPATIENT
Start: 2020-01-14 | End: 2020-01-15 | Stop reason: HOSPADM

## 2020-01-14 RX ORDER — ONDANSETRON 2 MG/ML
INJECTION INTRAMUSCULAR; INTRAVENOUS AS NEEDED
Status: DISCONTINUED | OUTPATIENT
Start: 2020-01-14 | End: 2020-01-14 | Stop reason: HOSPADM

## 2020-01-14 RX ORDER — CEFAZOLIN SODIUM/WATER 2 G/20 ML
2 SYRINGE (ML) INTRAVENOUS ONCE
Status: COMPLETED | OUTPATIENT
Start: 2020-01-14 | End: 2020-01-14

## 2020-01-14 RX ORDER — ROPIVACAINE HYDROCHLORIDE 2 MG/ML
INJECTION, SOLUTION EPIDURAL; INFILTRATION; PERINEURAL
Status: COMPLETED | OUTPATIENT
Start: 2020-01-14 | End: 2020-01-14

## 2020-01-14 RX ORDER — OXYCODONE HYDROCHLORIDE 5 MG/1
5-10 TABLET ORAL
Status: DISCONTINUED | OUTPATIENT
Start: 2020-01-14 | End: 2020-01-15 | Stop reason: HOSPADM

## 2020-01-14 RX ORDER — MIDAZOLAM HYDROCHLORIDE 1 MG/ML
2 INJECTION, SOLUTION INTRAMUSCULAR; INTRAVENOUS ONCE
Status: COMPLETED | OUTPATIENT
Start: 2020-01-14 | End: 2020-01-14

## 2020-01-14 RX ORDER — ONDANSETRON 4 MG/1
8 TABLET, ORALLY DISINTEGRATING ORAL
Status: DISCONTINUED | OUTPATIENT
Start: 2020-01-14 | End: 2020-01-15 | Stop reason: HOSPADM

## 2020-01-14 RX ADMIN — SODIUM CHLORIDE, SODIUM LACTATE, POTASSIUM CHLORIDE, AND CALCIUM CHLORIDE 100 ML/HR: 600; 310; 30; 20 INJECTION, SOLUTION INTRAVENOUS at 09:51

## 2020-01-14 RX ADMIN — TRANEXAMIC ACID 1000 MG: 100 INJECTION, SOLUTION INTRAVENOUS at 11:55

## 2020-01-14 RX ADMIN — OXYCODONE 10 MG: 5 TABLET ORAL at 20:43

## 2020-01-14 RX ADMIN — Medication 2 G: at 20:42

## 2020-01-14 RX ADMIN — DILTIAZEM HYDROCHLORIDE 120 MG: 120 CAPSULE, EXTENDED RELEASE ORAL at 22:00

## 2020-01-14 RX ADMIN — MIDAZOLAM 2 MG: 1 INJECTION INTRAMUSCULAR; INTRAVENOUS at 11:23

## 2020-01-14 RX ADMIN — ROPIVACAINE HYDROCHLORIDE 20 MG: 2 INJECTION, SOLUTION EPIDURAL; INFILTRATION at 11:25

## 2020-01-14 RX ADMIN — TAMSULOSIN HYDROCHLORIDE 0.4 MG: 0.4 CAPSULE ORAL at 20:42

## 2020-01-14 RX ADMIN — MEPIVACAINE HYDROCHLORIDE 3 ML: 20 INJECTION, SOLUTION EPIDURAL; INFILTRATION at 11:57

## 2020-01-14 RX ADMIN — FENTANYL CITRATE 50 MCG: 50 INJECTION INTRAMUSCULAR; INTRAVENOUS at 11:23

## 2020-01-14 RX ADMIN — ONDANSETRON 4 MG: 2 INJECTION INTRAMUSCULAR; INTRAVENOUS at 12:02

## 2020-01-14 RX ADMIN — Medication 2 G: at 11:32

## 2020-01-14 RX ADMIN — ACETAMINOPHEN 1000 MG: 500 TABLET, FILM COATED ORAL at 23:52

## 2020-01-14 RX ADMIN — HYDROMORPHONE HYDROCHLORIDE 0.5 MG: 2 INJECTION INTRAMUSCULAR; INTRAVENOUS; SUBCUTANEOUS at 14:25

## 2020-01-14 RX ADMIN — FLECAINIDE ACETATE 100 MG: 100 TABLET ORAL at 17:17

## 2020-01-14 RX ADMIN — Medication 1 AMPULE: at 17:16

## 2020-01-14 RX ADMIN — CELECOXIB 200 MG: 200 CAPSULE ORAL at 09:51

## 2020-01-14 RX ADMIN — PROPOFOL 50 MCG/KG/MIN: 10 INJECTION, EMULSION INTRAVENOUS at 12:01

## 2020-01-14 RX ADMIN — INSULIN HUMAN 10 UNITS: 100 INJECTION, SUSPENSION SUBCUTANEOUS at 18:15

## 2020-01-14 RX ADMIN — Medication 3 AMPULE: at 09:52

## 2020-01-14 RX ADMIN — HYDROMORPHONE HYDROCHLORIDE 1 MG: 1 INJECTION, SOLUTION INTRAMUSCULAR; INTRAVENOUS; SUBCUTANEOUS at 17:16

## 2020-01-14 RX ADMIN — DEXAMETHASONE SODIUM PHOSPHATE 10 MG: 4 INJECTION, SOLUTION INTRAMUSCULAR; INTRAVENOUS at 12:02

## 2020-01-14 RX ADMIN — OXYCODONE 10 MG: 5 TABLET ORAL at 15:47

## 2020-01-14 RX ADMIN — ACETAMINOPHEN 1000 MG: 500 TABLET, FILM COATED ORAL at 09:51

## 2020-01-14 RX ADMIN — Medication 10 ML: at 20:43

## 2020-01-14 RX ADMIN — Medication 10 MG: at 13:31

## 2020-01-14 RX ADMIN — CELECOXIB 200 MG: 200 CAPSULE ORAL at 20:42

## 2020-01-14 RX ADMIN — ASPIRIN 81 MG: 81 TABLET ORAL at 20:41

## 2020-01-14 RX ADMIN — FAMOTIDINE 20 MG: 20 TABLET, FILM COATED ORAL at 17:17

## 2020-01-14 RX ADMIN — Medication 1 AMPULE: at 20:40

## 2020-01-14 RX ADMIN — ACETAMINOPHEN 1000 MG: 10 INJECTION, SOLUTION INTRAVENOUS at 17:28

## 2020-01-14 NOTE — ANESTHESIA PROCEDURE NOTES
Peripheral Block    Start time: 1/14/2020 11:23 AM  End time: 1/14/2020 11:25 AM  Performed by: Heide Quick MD  Authorized by: Heide Quick MD       Pre-procedure: Indications: at surgeon's request and post-op pain management    Preanesthetic Checklist: patient identified, risks and benefits discussed, site marked, timeout performed, anesthesia consent given and patient being monitored    Timeout Time: 11:23          Block Type:   Block Type:   Adductor canal  Laterality:  Right  Monitoring:  Standard ASA monitoring, responsive to questions, continuous pulse ox, oxygen, frequent vital sign checks and heart rate  Injection Technique:  Single shot  Procedures: ultrasound guided    Patient Position: supine  Prep: chlorhexidine    Location:  Mid thigh  Needle Type:  Stimuplex  Needle Gauge:  22 G  Needle Localization:  Ultrasound guidance    Assessment:  Number of attempts:  1  Injection Assessment:  Incremental injection every 5 mL, negative aspiration for CSF, ultrasound image on chart, no paresthesia, local visualized surrounding nerve on ultrasound, negative aspiration for blood and no intravascular symptoms  Patient tolerance:  Patient tolerated the procedure well with no immediate complications

## 2020-01-14 NOTE — PERIOP NOTES
TRANSFER - OUT REPORT:    Verbal report given to KATHERINE Staton on Eneida Molina  being transferred to [facility](unit) for routine post - op       Report consisted of patients Situation, Background, Assessment and   Recommendations(SBAR). Information from the following report(s) SBAR was reviewed with the receiving nurse. Lines:   Peripheral IV 01/14/20 Left Forearm (Active)   Site Assessment Clean, dry, & intact 1/14/2020  2:09 PM   Phlebitis Assessment 0 1/14/2020  2:09 PM   Infiltration Assessment 0 1/14/2020  9:48 AM   Dressing Status Clean, dry, & intact; Occlusive 1/14/2020  2:09 PM   Dressing Type Tape;Transparent 1/14/2020  9:48 AM   Hub Color/Line Status Green 1/14/2020  2:09 PM   Action Taken Blood drawn 1/14/2020  9:48 AM        Opportunity for questions and clarification was provided.       Patient transported with:   O2 @ 1 liters

## 2020-01-14 NOTE — PERIOP NOTES
Teach back method used in review of Hibiclens usage preop/postop, TB screening, pain management goals, falls precautions and use of Nozin for prevention of staph infections. Incentive spirometer reviewed and pt reached TOTAL TIDAL VOLUME 2000 ML OBSERVED   in preop holding.

## 2020-01-14 NOTE — PERIOP NOTES
TRANSFER - IN REPORT:    Verbal report received from Su Brown RN(name) on Gill Crumb  being received from [facility](unit) for routine progression of care      Report consisted of patients Situation, Background, Assessment and   Recommendations(SBAR). Information from the following report(s) SBAR, Kardex and MAR was reviewed with the receiving nurse. Opportunity for questions and clarification was provided. Assessment completed upon patients arrival to unit and care assumed.

## 2020-01-14 NOTE — ANESTHESIA PROCEDURE NOTES
Spinal Block    Start time: 1/14/2020 11:54 AM  End time: 1/14/2020 11:58 AM  Performed by: Stuart Galvan MD  Authorized by: Stuart Galvan MD     Pre-procedure:   Indications: at surgeon's request and primary anesthetic  Preanesthetic Checklist: patient identified, risks and benefits discussed, anesthesia consent, site marked, patient being monitored and timeout performed    Timeout Time: 11:54          Spinal Block:   Patient Position:  Seated  Prep Region:  Lumbar  Prep: chlorhexidine and patient draped      Location:  L3-4  Technique:  Single shot    Local Dose (mL):  3    Needle:   Needle Type:  Pencan  Needle Gauge:  25 G  Attempts:  1      Events: CSF confirmed, no blood with aspiration and no paresthesia        Assessment:  Insertion:  Uncomplicated  Patient tolerance:  Patient tolerated the procedure well with no immediate complications

## 2020-01-14 NOTE — CONSULTS
69 yo admitted for right knee total arthroplasty. Hx of GERD, iron deficiency anemia, BPH, DM type II, HTN, a fib on Xarelto. Change regular diet to ADA. Novolin R 10 units before meals, per PTA list, patient takes 14 units before meals so may need to increase Novolin R if glucose elevated. A fib - Cardizem 120mg daily. Can start Xarelto tomorrow after 24 hours from surgery. No charge for this chart review. Will sign off. Please call with any questions.

## 2020-01-14 NOTE — PROGRESS NOTES
Problem: Mobility Impaired (Adult and Pediatric)  Goal: *Acute Goals and Plan of Care (Insert Text)  Description  GOALS (1-4 days):  (1.)Mr. Andrew Lancaster will move from supine to sit and sit to supine  in bed with SUPERVISION. (2.)Mr. Andrew Lancaster will transfer from bed to chair and chair to bed with SUPERVISION using the least restrictive device. (3.)Mr. Andrew Lancaster will ambulate with SUPERVISION for 200 feet with the least restrictive device. (4.)Mr. Andrew Lancaster will ambulate up/down 1 steps without a railing with STAND BY ASSIST with walker. (5.)Mr. Andrew Lancaster will increase right knee ROM to 5°-80°.  ________________________________________________________________________________________________     Outcome: Progressing Towards Goal     PHYSICAL THERAPY JOINT CAMP TKA: Initial Assessment 1/14/2020  INPATIENT: Hospital Day: 1  [place] / [place] / Product Type: Medicare /      NAME/AGE/GENDER: Sherif Alicea is a 70 y.o. male   PRIMARY DIAGNOSIS:  Unilateral primary osteoarthritis, right knee [M17.11]   Procedure(s) and Anesthesia Type:     * RIGHT KNEE ARTHROPLASTY TOTAL / [place] - Spinal (Right)  ICD-10: Treatment Diagnosis:    Pain in Right Knee (M25.561)  Stiffness of Right Knee, Not elsewhere classified (M25.661)  Difficulty in walking, Not elsewhere classified (R26.2)      ASSESSMENT:     Mr. Andrew Lancaster presents s/p R TKA. Patient demonstrates decreased R LE strength and ROM and decreased independence. Patient is independent at baseline and would benefit from therapy to facilitate a return to prior level. Patient will return home at d/c with assist from his spouse.       This section established at most recent assessment   PROBLEM LIST (Impairments causing functional limitations):  Decreased Strength  Decreased ADL/Functional Activities  Decreased Transfer Abilities  Decreased Ambulation Ability/Technique  Decreased Balance  Increased Pain  Decreased Flexibility/Joint Mobility  Edema/Girth  Decreased [place] with Home Exercise Program   INTERVENTIONS PLANNED: (Benefits and precautions of physical therapy have been discussed with the patient.)  bed mobility  gait training  home exercise program (HEP)  Range of Motion: active/assisted/passive  Therapeutic Activities  therapeutic exercise/strengthening  transfer training  Group Therapy     TREATMENT PLAN: Frequency/Duration: Follow patient BID for duration of hospital stay to address above goals. Rehabilitation Potential For Stated Goals: Good     RECOMMENDED REHABILITATION/EQUIPMENT: (at time of discharge pending progress): Continue Skilled Therapy. HISTORY:   History of Present Injury/Illness (Reason for Referral):  R TKA  Past Medical History/Comorbidities:   Mr. Wright Lefort  has a past medical history of Anemia, Arthritis, Atrial fibrillation ([address] Calculus of kidney, Conjunctivitis unspecified, Diabetes mellitus type II, uncontrolled ([address] Disorders of bursae and tendons in shoulder region, unspecified, Hypertension, Ingrowing nail, Lumbago, Obesity, Osteoarthrosis, unspecified whether generalized or localized, unspecified site, Other ill-defined conditions(799.89) (10/2011), Proteinuria, Status post hip replacement (3/2/2012), Type 2 diabetes mellitus ([address] (2004), Type I (juvenile type) diabetes mellitus without mention of complication, not stated as uncontrolled, and Unspecified hyperplasia of prostate without urinary obstruction and other lower urinary tract symptoms (LUTS).  He also has no past medical history of Aneurysm ([address] Asthma, Autoimmune disease ([address] CAD (coronary artery disease), Cancer ([address] Chronic kidney disease, Chronic obstructive pulmonary disease ([address] Chronic pain, Coagulation disorder ([address] Difficult intubation, Endocarditis, GERD (gastroesophageal reflux disease), Heart failure ([address] Liver disease, Malignant hyperthermia due to anesthesia, Nausea & vomiting, Nicotine vapor product user, Non-nicotine vapor product user, Pseudocholinesterase deficiency, Psychiatric disorder, PUD (peptic ulcer disease), Rheumatic fever, Seizures ([facility] Sleep apnea, Stroke ([facility] Thromboembolus ([facility] or Thyroid disease. Mr. Andrew Lancaster  has a past surgical history that includes hx back surgery (1989); hx hip replacement (Left, 3/1/12); hx cataract removal (Bilateral); hx colonoscopy; ir esophagoscopy rigid/fluoro; and hx afib ablation (2014).   Social History/Living Environment:   Home Environment: Private residence  # Steps to Enter: 1  Rails to Enter: No  One/Two Story Residence: One story  Living Alone: No  Support Systems: Spouse/Significant Other/Partner, Family member(s)  Patient Expects to be Discharged to[de-identified] Private residence  Current DME Used/Available at Home: Levonne Abelson, rollator, Walker, rolling, Commode, bedside, [address] [facility], [address], straight, [place], [address] [facility] chair, Wheelchair  Tub or Shower Type: Tub/Shower combination  Prior Level of Function/Work/Activity:  Independent    Number of Personal Factors/Comorbidities that affect the Plan of Care: 0: LOW COMPLEXITY   EXAMINATION:   Most Recent Physical Functioning:      Gross Assessment  AROM: Within functional limits(L LE)  Strength: Generally decreased, functional(L LE)                RLE Strength  R Hip Flexion: 2  R Knee Flexion: 2+  R Knee Extension: 2+    Bed Mobility  Supine to Sit: Minimum assistance    Transfers  Sit to Stand: Minimum assistance  Stand to Sit: Contact guard assistance  Bed to Chair: Contact guard assistance;Minimum assistance    Balance  Sitting: Intact  Standing: With support              Weight Bearing Status  Right Side Weight Bearing: As tolerated  Distance (ft): 15 Feet (ft)  Ambulation - Level of Assistance: Contact guard assistance;Minimal assistance  Assistive Device: Walker, rolling  Base of Support: Center of gravity altered  Speed/Brittney: Slow  Step Length: Left shortened  Stance: Right decreased  Gait Abnormalities: Antalgic  Interventions: Safety awareness training;Verbal cues     Braces/Orthotics: none    Right Knee Cold  Type: Cryocuff      Body Structures Involved:  Joints  Muscles Body Functions Affected: Movement Related Activities and Participation Affected: Mobility   Number of elements that affect the Plan of Care: 4+: HIGH COMPLEXITY   CLINICAL PRESENTATION:   Presentation: Stable and uncomplicated: LOW COMPLEXITY   CLINICAL DECISION MAKING:   [facility] AM-PAC 6 Clicks   Basic Mobility Inpatient Short Form  How much difficulty does the patient currently have. .. Unable A Lot A Little None   1. Turning over in bed (including adjusting bedclothes, sheets and blankets)? [] 1   [] 2   [x] 3   [] 4   2. Sitting down on and standing up from a chair with arms ( e.g., wheelchair, bedside commode, etc.)   [] 1   [] 2   [x] 3   [] 4   3. Moving from lying on back to sitting on the side of the bed? [] 1   [] 2   [x] 3   [] 4   How much help from another person does the patient currently need. .. Total A Lot A Little None   4. Moving to and from a bed to a chair (including a wheelchair)? [] 1   [] 2   [x] 3   [] 4   5. Need to walk in hospital room? [] 1   [] 2   [x] 3   [] 4   6. Climbing 3-5 steps with a railing? [] 1   [] 2   [x] 3   [] 4   © 2007, Trustees of [facility], under license to [employer]. All rights reserved     Score:  Initial: 18 Most Recent: X (Date: -- )    Interpretation of Tool:  Represents activities that are increasingly more difficult (i.e. Bed mobility, Transfers, Gait). Medical Necessity:     Patient is expected to demonstrate progress in   strength, range of motion, balance, and coordination   to   increase independence with mobility and HEP. .  Reason for Services/Other Comments:  Patient continues to require skilled intervention due to   Decreased R LE strength and ROM and decreased independence with mobility s/p TKA.    .   Use of outcome tool(s) and clinical judgement create a POC that gives a: Clear prediction of patient's progress: LOW COMPLEXITY            TREATMENT:   (In addition to Assessment/Re-Assessment sessions the following treatments were rendered)     Pre-treatment Symptoms/Complaints:  Patient agreeable to getting to a chair to eat his meal.  Pain Initial:   Pain Intensity 1: 4  Post Session:  4/10     Assessment/Reassessment only, no treatment provided today    Date:   Date:   Date:     ACTIVITY/EXERCISE AM PM AM PM AM PM   GROUP THERAPY  []  []  []  []  []  []   Ankle Pumps         Quad Sets         Gluteal Sets         Hip ABd/ADduction         Straight Leg Raises         Knee Slides         Short Arc Quads         Long Arc Quads         Chair Slides                  B = bilateral; AA = active assistive; A = active; P = passive      Treatment/Session Assessment:     Response to Treatment:  Patient participated well and moved well. Encouraged patient to move his R LE while in the chair but did not do exercises as his meal had already arrived. Education:  [] Home Exercises  [x] Fall Precautions  []  [] D/C Instruction Review  [] Knee Prosthesis Review  [x] Walker Management/Safety [] Adaptive Equipment as Needed       Interdisciplinary Collaboration:   Physical Therapist  Occupational Therapist  Registered Nurse    After treatment position/precautions:   Up in chair  Bed/Chair-wheels locked  Caregiver at bedside  Call light within reach  Family at bedside    Compliance with Program/Exercises: Will assess as treatment progresses. Recommendations/Intent for next treatment session:  Treatment next visit will focus on increasing Mr. Ratliff's independence with bed mobility, transfers, gait training, strength/ROM exercises, modalities for pain, and patient education.       Total Treatment Duration:  PT Patient Time In/Time Out  Time In: 1550  Time Out: Graciela [address], PT

## 2020-01-14 NOTE — ANESTHESIA POSTPROCEDURE EVALUATION
Procedure(s):  RIGHT KNEE ARTHROPLASTY TOTAL / Grecia Emerson. spinal    Anesthesia Post Evaluation      Multimodal analgesia: multimodal analgesia used between 6 hours prior to anesthesia start to PACU discharge  Patient location during evaluation: bedside  Patient participation: complete - patient participated  Level of consciousness: awake and alert  Pain score: 1  Pain management: adequate  Airway patency: patent  Anesthetic complications: no  Cardiovascular status: acceptable  Respiratory status: acceptable  Hydration status: acceptable  Comments: Patient doing well. Continue care on floor.    Post anesthesia nausea and vomiting:  none      Vitals Value Taken Time   /74 1/14/2020  2:16 PM   Temp 36.6 °C (97.9 °F) 1/14/2020  1:41 PM   Pulse 55 1/14/2020  2:16 PM   Resp 16 1/14/2020  2:16 PM   SpO2 95 % 1/14/2020  2:16 PM

## 2020-01-14 NOTE — ANESTHESIA PREPROCEDURE EVALUATION
Relevant Problems   No relevant active problems       Anesthetic History   No history of anesthetic complications            Review of Systems / Medical History  Patient summary reviewed and pertinent labs reviewed    Pulmonary  Within defined limits                 Neuro/Psych   Within defined limits           Cardiovascular    Hypertension: well controlled        Dysrhythmias (Currently NSR) : atrial fibrillation and atrial flutter      Exercise tolerance: >4 METS  Comments: TTE 10/2019: 65-70%;  Mild pulm HTN  Denies CP, SOB or changes in functional status   GI/Hepatic/Renal  Within defined limits              Endo/Other    Diabetes: well controlled, type 2    Obesity and arthritis     Other Findings              Physical Exam    Airway  Mallampati: II  TM Distance: 4 - 6 cm  Neck ROM: normal range of motion   Mouth opening: Normal     Cardiovascular    Rhythm: regular  Rate: abnormal        Comments: Bradycardia Dental    Dentition: Caps/crowns     Pulmonary  Breath sounds clear to auscultation               Abdominal  GI exam deferred       Other Findings            Anesthetic Plan    ASA: 3  Anesthesia type: spinal      Post-op pain plan if not by surgeon: peripheral nerve block single    Induction: Intravenous  Anesthetic plan and risks discussed with: Patient

## 2020-01-14 NOTE — PERIOP NOTES
TRANSFER - OUT REPORT:    Verbal report given to FELICIA RN(name) on Gayla Phillips  being transferred to PRE-OP(unit) for routine progression of care       Report consisted of patients Situation, Background, Assessment and   Recommendations(SBAR). Information from the following report(s) SBAR, MAR and Med Rec Status was reviewed with the receiving nurse. Lines:   Peripheral IV 01/14/20 Left Forearm (Active)   Site Assessment Clean, dry, & intact 1/14/2020  9:48 AM   Phlebitis Assessment 0 1/14/2020  9:48 AM   Infiltration Assessment 0 1/14/2020  9:48 AM   Dressing Status Clean, dry, & intact 1/14/2020  9:48 AM   Dressing Type Tape;Transparent 1/14/2020  9:48 AM   Hub Color/Line Status Green; Infusing 1/14/2020  9:48 AM   Action Taken Blood drawn 1/14/2020  9:48 AM        Opportunity for questions and clarification was provided.       Patient transported with:   [employer]

## 2020-01-14 NOTE — PROGRESS NOTES
Problem: Self Care Deficits Care Plan (Adult)  Goal: *Acute Goals and Plan of Care (Insert Text)  Description  GOALS:   DISCHARGE GOALS (in preparation for going home/rehab):  3 days  1. Mr. Jayesh Brantley will perform one lower body dressing activity with minimal assistance required to demonstrate improved functional mobility and safety. 2.  Mr. Jayesh Brantley will perform one lower body bathing activity with minimal assistance required to demonstrate improved functional mobility and safety. 3.  Mr. Jayesh Brantley will perform toileting/toilet transfer with contact guard assistance to demonstrate improved functional mobility and safety. 4.  Mr. Jayesh Brantley will perform shower transfer with contact guard assistance to demonstrate improved functional mobility and safety. JOINT CAMP OCCUPATIONAL THERAPY TKA: Initial Assessment and Daily Note 1/14/2020  INPATIENT: Hospital Day: 1  [place] / [place] / Product Type: Medicare /      NAME/AGE/GENDER: Lili Sinclair is a 70 y.o. male   PRIMARY DIAGNOSIS:  Unilateral primary osteoarthritis, right knee [M17.11]   Procedure(s) and Anesthesia Type:     * RIGHT KNEE ARTHROPLASTY TOTAL / Leane Oris - Spinal (Right)  ICD-10: Treatment Diagnosis:    Pain in Right Knee (M25.561)  Stiffness of Right Knee, Not elsewhere classified (U01.077)      ASSESSMENT:     Mr. Jayesh Brantley is s/p Right TKA and presents with decreased weight bearing on R LE and decreased independence with functional mobility and activities of daily living as compared to baseline level of function and safety. Patient would benefit from skilled Occupational Therapy to maximize independence and safety with self-care task and functional mobility. Pt would also benefit from education on adaptive equipment and safety precautions in preparation for going home with spouse. Patient able to don underwear at edge of bed with assist. Mobilized from bed to recliner using a rolling walker.  Should progress well with ADL's tomorrow. This section established at most recent assessment   PROBLEM LIST (Impairments causing functional limitations):  Decreased Strength  Decreased ADL/Functional Activities  Decreased Transfer Abilities  Increased Pain  Increased Fatigue  Decreased Flexibility/Joint Mobility  Decreased Knowledge of Precautions   INTERVENTIONS PLANNED: (Benefits and precautions of occupational therapy have been discussed with the patient.)  Activities of daily living training  Adaptive equipment training  Balance training  Clothing management  Donning&doffing training  Theraputic activity     TREATMENT PLAN: Frequency/Duration: Follow patient 1-2tx to address above goals. Rehabilitation Potential For Stated Goals: Excellent     RECOMMENDED REHABILITATION/EQUIPMENT: (at time of discharge pending progress): Continue Skilled Therapy. OCCUPATIONAL PROFILE AND HISTORY:   History of Present Injury/Illness (Reason for Referral): Pt presents this date s/p (right) TKA. Past Medical History/Comorbidities:   Mr. Marisa Soni  has a past medical history of Anemia, Arthritis, Atrial fibrillation ([address] Calculus of kidney, Conjunctivitis unspecified, Diabetes mellitus type II, uncontrolled ([address] Disorders of bursae and tendons in shoulder region, unspecified, Hypertension, Ingrowing nail, Lumbago, Obesity, Osteoarthrosis, unspecified whether generalized or localized, unspecified site, Other ill-defined conditions(799.89) (10/2011), Proteinuria, Status post hip replacement (3/2/2012), Type 2 diabetes mellitus ([address] (2004), Type I (juvenile type) diabetes mellitus without mention of complication, not stated as uncontrolled, and Unspecified hyperplasia of prostate without urinary obstruction and other lower urinary tract symptoms (LUTS).  He also has no past medical history of Aneurysm ([address] Asthma, Autoimmune disease ([address] CAD (coronary artery disease), Cancer ([address] Chronic kidney disease, Chronic obstructive pulmonary disease ([address] Chronic pain, Coagulation disorder ([address] Difficult intubation, Endocarditis, GERD (gastroesophageal reflux disease), Heart failure ([address] Liver disease, Malignant hyperthermia due to anesthesia, Nausea & vomiting, Nicotine vapor product user, Non-nicotine vapor product user, Pseudocholinesterase deficiency, Psychiatric disorder, PUD (peptic ulcer disease), Rheumatic fever, Seizures ([address] Sleep apnea, Stroke ([address] Thromboembolus ([address] or Thyroid disease. Mr. Noni Lee  has a past surgical history that includes hx back surgery (1989); hx hip replacement (Left, 3/1/12); hx cataract removal (Bilateral); hx colonoscopy; ir esophagoscopy rigid/fluoro; and hx afib ablation (2014). Social History/Living Environment:   Home Environment: Private residence  # Steps to Enter: 1  Rails to Enter: No  One/Two Story Residence: One story  Living Alone: No  Support Systems: Spouse/Significant Other/Partner  Patient Expects to be Discharged to[de-identified] Private residence  Current DME Used/Available at Home: Esther Severe, rollator, Walker, rolling, Commode, bedside, [address] [facility], [address], straight, [address] [facility], [address] [facility] chair, Wheelchair  Tub or Shower Type: Tub/Shower combination  Prior Level of Function/Work/Activity:  Independent prior. Number of Personal Factors/Comorbidities that affect the Plan of Care: Brief history (0):  LOW COMPLEXITY   ASSESSMENT OF OCCUPATIONAL PERFORMANCE[de-identified]   Most Recent Physical Functioning:   Balance  Sitting: Intact  Standing: With support       Gross Assessment  AROM: Within functional limits(L LE)  Strength: Generally decreased, functional(L LE)            Coordination  Fine Motor Skills-Upper: Left Intact; Right Intact  Gross Motor Skills-Upper: Left Intact; Right Intact         Mental Status  Neurologic State: Alert  Orientation Level: Oriented X4  Cognition: Appropriate decision making  Perception: Appears intact          RLE Strength  R Hip Flexion: 2  R Knee Flexion: 2+  R Knee Extension: 2+     Basic ADLs (From Assessment) Complex ADLs (From Assessment)   Basic ADL  Feeding: Independent  Oral Facial Hygiene/Grooming: Setup  Bathing: Minimum assistance  Upper Body Dressing: Setup  Lower Body Dressing: Moderate assistance  Toileting: Moderate assistance     Grooming/Bathing/Dressing Activities of Daily Living                       Functional Transfers  Toilet Transfer : Minimum assistance  Shower Transfer: Moderate assistance     Bed/Mat Mobility  Supine to Sit: Minimum assistance  Sit to Stand: Minimum assistance  Stand to Sit: Contact guard assistance  Bed to Chair: Minimum assistance;Contact guard assistance         Physical Skills Involved:  Range of Motion  Balance  Strength Cognitive Skills Affected (resulting in the inability to perform in a timely and safe manner):  [facility]  Psychosocial Skills Affected:  WFL    Number of elements that affect the Plan of Care: 1-3:  LOW COMPLEXITY   CLINICAL DECISION MAKING:   [employer] AM-PAC 6 Clicks   Daily Activity Inpatient Short Form  How much help from another person does the patient currently need. .. Total A Lot A Little None   1. Putting on and taking off regular lower body clothing? [] 1   [x] 2   [] 3   [] 4   2. Bathing (including washing, rinsing, drying)? [] 1   [x] 2   [] 3   [] 4   3. Toileting, which includes using toilet, bedpan or urinal?   [] 1   [x] 2   [] 3   [] 4   4. Putting on and taking off regular upper body clothing? [] 1   [] 2   [] 3   [x] 4   5. Taking care of personal grooming such as brushing teeth? [] 1   [] 2   [] 3   [x] 4   6. Eating meals? [] 1   [] 2   [] 3   [x] 4   © 2007, Trustees of [facility] [employer], under license to [employer]. All rights reserved     Score:  Initial: 18 Most Recent: X (Date: -- )    Interpretation of Tool:  Represents activities that are increasingly more difficult (i.e. Bed mobility, Transfers, Gait).     Medical Necessity:     Skilled intervention continues to be required due to Deficits noted above. Reason for Services/Other Comments:  Patient continues to require skilled intervention due to   New TKA   . Use of outcome tool(s) and clinical judgement create a POC that gives a: MODERATE COMPLEXITY            TREATMENT:   (In addition to Assessment/Re-Assessment sessions the following treatments were rendered)     Pre-treatment Symptoms/Complaints:    Pain: Initial:   Pain Intensity 1: 4  Pain Location 1: Knee  Post Session:  4     Self Care: (10): Procedure(s) (per grid) utilized to improve and/or restore self-care/home management as related to dressing, toileting, and grooming. Required minimal verbal and tactile cueing to facilitate activities of daily living skills. Initial evaluation 5 minutes. Treatment/Session Assessment:     Response to Treatment:  Good, sitting up in recliner. Education:  [] Home Exercises  [x] Fall Precautions  [] Hip Precautions [] Going Home Video  [x] Knee/Hip Prosthesis Review  [x] Walker Management/Safety [x] Adaptive Equipment as Needed       Interdisciplinary Collaboration:   Physical Therapist  Occupational Therapist  Registered Nurse    After treatment position/precautions:   Up in chair  Bed/Chair-wheels locked  Caregiver at bedside  Call light within reach  RN notified     Compliance with Program/Exercises: Compliant all of the time, Will assess as treatment progresses. Recommendations/Intent for next treatment session:  Treatment next visit will focus on increasing Mr. Blancos independence with bed mobility, transfers, self care, functional mobility, modalities for pain, and patient education.       Total Treatment Duration:  OT Patient Time In/Time Out  Time In: 1600  Time Out: 90 [facility],

## 2020-01-14 NOTE — PROGRESS NOTES
01/14/20 1548   Oxygen Therapy   O2 Sat (%) 95 %   Pulse via Oximetry 65 beats per minute   O2 Device Nasal cannula  (weaned to RA)   O2 Flow Rate (L/min) 1 l/min   Patient achieved   2000    Ml/sec on IS. Patient encouraged to do 10 breaths every hour while awake-patient agreed and demonstrated. No shortness of breath or distress noted. BS are clear b/l. [facility] notes reviewed- Sat monitor placed at bedside.

## 2020-01-14 NOTE — INTERVAL H&P NOTE
H&P Update:  Breanna Solano was seen and examined. History and physical has been reviewed. The patient has been examined.  There have been no significant clinical changes since the completion of the originally dated History and Physical.

## 2020-01-14 NOTE — OP NOTES
[address] [place] [address]  Cemented Total Knee Arthroplasty  Patient:Ronni Phillips   : 1940  Medical Record [id number]  Pre-operative Diagnosis:  Unilateral primary osteoarthritis, right knee [M17.11]  Post-operative Diagnosis: Unilateral primary osteoarthritis, right knee [M17.11]    Surgeon: Matthew Saab MD  Assistant: Jaki Cason PA-C    Anesthesia: Spinal    Procedure: Cemented Total Knee Arthroplasty   The complexity of the total joint surgery requires the use of a first assistant for positioning, retraction and assistance in closure. The patient's Body mass index is 33.43 kg/m²., BMI's greater then 40 make surgical exposure and retraction extremely difficult and increase operative time. Tourniquet Time: none  EBL: 150cc  Additional Findings: Severe DJD  Releases none    Paulo Kathleen was brought to the operating room and positioned on the operating table. He was anethestized  IV antibiotics were administered per CMS protocol. Prior to the incision being made a timeout was called identifying the patient, procedure ,operative side and surgeon. The right leg was prepped and draped in the usual sterile manner  An anterior longitudinal incision was accomplished just medial to the tibial tubercle and extending approximal 6 centimeters proximal to the superior pole of the patella. A medial parapatellar capsular incision was performed. The medial capsular flap was elevated around to the insertion of the semimembranous tendon. The patella was everted and the knee flexed and externally rotated. The medial and external menisci were excised. The lateral half of the fat pad excised and the patella femoral ligament was released. The anterior cruciate ligament was resected and the posterior cruciate ligament was substituted. Using extramedullary instrumentation, the tibial cut was accomplished with appropriate posterior slope.   Approxiamately 2 mm of bone was removed from the low side of the tibia. The distal femur was next addressed. A drill hole was made above the intracondylar notch. Using appropriate intramedullary instrumentation,a 5 degree valgus distal cut was accomplished. A femur was sized. The anterior and posterior cuts were then made about the distal femur. The osteophytes were removed from the tibial and femoral surfaces. The flexion and extension gaps were assessed with the appropriate spacer blocks. Additional surgical procedures included none. The flexion and extension gaps were deemed appropriately balanced. The appropriate cutting blocks were then utilized to perform the anterior chamfer, posterior chamfer and notch cuts, with appropriate lateral tranlation accomplished for the patellofemoral groove. The tibia was sized. The tibial base plate was pinned into place with the appropriate external rotation and stem site prepared. A preliminary range of motion was accomplished with the above size trial components. A polyethylene insert allowed the patient to obtain full extension as well as appropriate flexion. The patient's ligaments were stable in flexion and extension to medial and lateral stressing and the alignment was through the appropriate mechanical axis. The patella was then everted. It was prepared for a pegged patellar implant per routine. All trial components were removed and the implants were cemented into position. Bone and cement debris were removed. The betadine lavage protocol was performed. [facility] knee was placed through range of motion and noted to be stable as mentioned above with the trial components. The wound was dry, therefore no drain was used. The operative knee was injected with 60cc of Naropin, 10 cc's of morphine and 1 cc of 30mg of Toradol. The capsular layer was closed using a #1 vicryl suture, while subcutaneous layers were closed using 2-0 Vicryl interrupted sutures and a #1 Stratofix. Finally the skin was closed using 3-0 Vicryl and a Zipline closure. A sterile sterile bandage was applied. An Iceman cryo pad was applied on the operative leg. Sponge count and needle counts were correct. Tripp Bull left the operating room     Implants:   Implant Name Type Inv.  Item Serial No.  Lot No. LRB No. Used   CEMENT BNE SIMPLEX W/O GENT -- PK/10 ONLY - [id number]  CEMENT BNE SIMPLEX W/O GENT -- PK/10 ONLY [id number] [employer] [facility] [id number] Right 2   FEM PS SZ 8 RT LEN -- ATTUNE - [id number]  FEM PS SZ 8 RT LEN -- ATTUNE [id number] [facility] [employer] [id number] Right 1   BASE TIB RP SZ8 LEN -- ATTUNE - [id number]  BASE TIB RP SZ8 LEN -- ATTUNE [id number] [address] [place] [address] [id number] Right 1   patella   [id number]  [id number] Right 1   INSERT TIB PS RP SZ 8 8MM -- ATTUNE - [id number]  INSERT TIB PS RP SZ 8 8MM -- ATTUNE [id number] [facility] [employer] [id number] Right 1     Signed By: Debi Willard MD

## 2020-01-15 ENCOUNTER — PATIENT OUTREACH (OUTPATIENT)
Dept: CASE MANAGEMENT | Age: 80
End: 2020-01-15

## 2020-01-15 VITALS
WEIGHT: 233 LBS | DIASTOLIC BLOOD PRESSURE: 78 MMHG | TEMPERATURE: 98 F | BODY MASS INDEX: 33.36 KG/M2 | HEART RATE: 68 BPM | SYSTOLIC BLOOD PRESSURE: 136 MMHG | HEIGHT: 70 IN | RESPIRATION RATE: 20 BRPM | OXYGEN SATURATION: 98 %

## 2020-01-15 LAB
ANION GAP SERPL CALC-SCNC: 7 MMOL/L (ref 7–16)
BUN SERPL-MCNC: 30 MG/DL (ref 8–23)
CALCIUM SERPL-MCNC: 8.1 MG/DL (ref 8.3–10.4)
CHLORIDE SERPL-SCNC: 100 MMOL/L (ref 98–107)
CO2 SERPL-SCNC: 26 MMOL/L (ref 21–32)
CREAT SERPL-MCNC: 1.55 MG/DL (ref 0.8–1.5)
GLUCOSE BLD STRIP.AUTO-MCNC: 375 MG/DL (ref 65–100)
GLUCOSE SERPL-MCNC: 366 MG/DL (ref 65–100)
POTASSIUM SERPL-SCNC: 5.1 MMOL/L (ref 3.5–5.1)
SODIUM SERPL-SCNC: 133 MMOL/L (ref 136–145)

## 2020-01-15 PROCEDURE — 97116 GAIT TRAINING THERAPY: CPT

## 2020-01-15 PROCEDURE — 74011636637 HC RX REV CODE- 636/637: Performed by: FAMILY MEDICINE

## 2020-01-15 PROCEDURE — 74011250636 HC RX REV CODE- 250/636: Performed by: ORTHOPAEDIC SURGERY

## 2020-01-15 PROCEDURE — 82962 GLUCOSE BLOOD TEST: CPT

## 2020-01-15 PROCEDURE — 97110 THERAPEUTIC EXERCISES: CPT

## 2020-01-15 PROCEDURE — 36415 COLL VENOUS BLD VENIPUNCTURE: CPT

## 2020-01-15 PROCEDURE — 97150 GROUP THERAPEUTIC PROCEDURES: CPT

## 2020-01-15 PROCEDURE — 97535 SELF CARE MNGMENT TRAINING: CPT

## 2020-01-15 PROCEDURE — 74011250637 HC RX REV CODE- 250/637: Performed by: ORTHOPAEDIC SURGERY

## 2020-01-15 PROCEDURE — 80048 BASIC METABOLIC PNL TOTAL CA: CPT

## 2020-01-15 RX ORDER — ASPIRIN 81 MG/1
81 TABLET ORAL EVERY 12 HOURS
Qty: 8 TAB | Refills: 0 | Status: SHIPPED | OUTPATIENT
Start: 2020-01-15 | End: 2020-01-19

## 2020-01-15 RX ORDER — OXYCODONE HYDROCHLORIDE 5 MG/1
5-10 TABLET ORAL
Qty: 60 TAB | Refills: 0 | Status: SHIPPED | OUTPATIENT
Start: 2020-01-15 | End: 2020-01-22

## 2020-01-15 RX ADMIN — SODIUM CHLORIDE, SODIUM LACTATE, POTASSIUM CHLORIDE, AND CALCIUM CHLORIDE 500 ML: 600; 310; 30; 20 INJECTION, SOLUTION INTRAVENOUS at 08:00

## 2020-01-15 RX ADMIN — OXYCODONE 10 MG: 5 TABLET ORAL at 11:49

## 2020-01-15 RX ADMIN — ACETAMINOPHEN 1000 MG: 500 TABLET, FILM COATED ORAL at 04:35

## 2020-01-15 RX ADMIN — FAMOTIDINE 20 MG: 20 TABLET, FILM COATED ORAL at 07:48

## 2020-01-15 RX ADMIN — FLECAINIDE ACETATE 100 MG: 100 TABLET ORAL at 07:47

## 2020-01-15 RX ADMIN — ASPIRIN 81 MG: 81 TABLET ORAL at 07:48

## 2020-01-15 RX ADMIN — Medication 10 ML: at 04:35

## 2020-01-15 RX ADMIN — INSULIN HUMAN 10 UNITS: 100 INJECTION, SOLUTION PARENTERAL at 07:46

## 2020-01-15 RX ADMIN — LISINOPRIL 10 MG: 5 TABLET ORAL at 07:47

## 2020-01-15 RX ADMIN — Medication 2 G: at 04:35

## 2020-01-15 RX ADMIN — OXYCODONE 10 MG: 5 TABLET ORAL at 07:46

## 2020-01-15 RX ADMIN — INSULIN HUMAN 10 UNITS: 100 INJECTION, SOLUTION PARENTERAL at 11:49

## 2020-01-15 RX ADMIN — Medication 1 AMPULE: at 07:47

## 2020-01-15 RX ADMIN — SENNOSIDES AND DOCUSATE SODIUM 2 TABLET: 8.6; 5 TABLET ORAL at 07:46

## 2020-01-15 NOTE — PROGRESS NOTES
January 15, 2020         Post Op day: 1 Day Post-Op     Admit Date: 2020  Admit Diagnosis: Unilateral primary osteoarthritis, right knee [M17.11]  Arthritis of knee, right [M17.11]        Subjective: Doing well, No complaints, No SOB, No Chest Pain, No Nausea or Vomiting     Objective:   Vital Signs are Stable, No Acute Distress, Alert and Oriented, Dressing is Dry,  Neurovascular exam is normal.     Assessment / Plan :  Patient Active Problem List   Diagnosis Code    Diabetes ([facility] E11.9    Hypertension I10    Arthritis M19.90    Atrial fibrillation, paroxysmal ([facility] I48.91    Osteoarthritis M19.90    Benign prostatic disease N42.9    Fatigue R53.83    Obesity E66.9    Anticoagulant long-term use Z79.01    Dizziness R42    Adenomatous polyp of ascending colon D12.2    Type 2 diabetes with nephropathy (HCC) E11.21    Arthritis of knee, right M17.11    Status post total right knee replacement Z96.651      Patient Vitals for the past 8 hrs:   BP Temp Pulse Resp SpO2   01/15/20 0431 142/64 98 °F (36.7 °C) 68 18 93 %   20 2351 133/75 98 °F (36.7 °C) 72 18 92 %    Temp (24hrs), Av.9 °F (36.6 °C), Min:97.7 °F (36.5 °C), Max:98 °F (36.7 °C)    Body mass index is 33.43 kg/m². Lab Results   Component Value Date/Time    HGB 11.9 (L) 2020 07:15 PM      Pt seen by and discussed with Supervising Physician   Continue PT, current pain meds  Creatinine slightly elevated. Will hold Celebrex, give fluid bolus. Will have hospitalist assess patient again for extremely elevated blood glucose. On aspirin now, will resume Xarelto on Friday evening. Home this afternoon if therapy goes well.        Signed By: KRISTINA Sutherland

## 2020-01-15 NOTE — PROGRESS NOTES
Problem: Mobility Impaired (Adult and Pediatric)  Goal: *Acute Goals and Plan of Care (Insert Text)  Description  GOALS (1-4 days):  (1.)Mr. Renea Siu will move from supine to sit and sit to supine  in bed with SUPERVISION. (2.)Mr. Renea Siu will transfer from bed to chair and chair to bed with SUPERVISION using the least restrictive device. (3.)Mr. Renea Siu will ambulate with SUPERVISION for 200 feet with the least restrictive device. (4.)Mr. Renea Siu will ambulate up/down 1 steps without a railing with STAND BY ASSIST with walker. (5.)Mr. Renea Siu will increase right knee ROM to 5°-80°.  ________________________________________________________________________________________________     Outcome: Progressing Towards Goal     PHYSICAL THERAPY JOINT CAMP TKA: Daily Note and AM 1/15/2020  INPATIENT: Hospital Day: 2  [place] / [place] / Product Type: Medicare /      NAME/AGE/GENDER: Cornelia Crisostomo is a 70 y.o. male   PRIMARY DIAGNOSIS:  Unilateral primary osteoarthritis, right knee [M17.11]   Procedure(s) and Anesthesia Type:     * RIGHT KNEE ARTHROPLASTY TOTAL / Riley [place] - Spinal (Right)  ICD-10: Treatment Diagnosis:    · Pain in Right Knee (M25.561)  · Stiffness of Right Knee, Not elsewhere classified (M25.661)  · Difficulty in walking, Not elsewhere classified (R26.2)      ASSESSMENT:     Mr. Renea Siu presents s/p R TKA. Patient demonstrates decreased R LE strength and ROM and decreased independence. Patient is independent at baseline and would benefit from therapy to facilitate a return to prior level. Patient will return home at d/c with assist from his spouse. 1/15/20:  Patient participated well and moved well with regards to exercises and gait. Episode of dizziness/light-headedness earlier today but resolved. This section established at most recent assessment   PROBLEM LIST (Impairments causing functional limitations):  1. Decreased Strength  2.  Decreased ADL/Functional Activities  3. Decreased Transfer Abilities  4. Decreased Ambulation Ability/Technique  5. Decreased Balance  6. Increased Pain  7. Decreased Flexibility/Joint Mobility  8. Edema/Girth  9. Decreased [place] with Home Exercise Program   INTERVENTIONS PLANNED: (Benefits and precautions of physical therapy have been discussed with the patient.)  1. bed mobility  2. gait training  3. home exercise program (HEP)  4. Range of Motion: active/assisted/passive  5. Therapeutic Activities  6. therapeutic exercise/strengthening  7. transfer training  8. Group Therapy     TREATMENT PLAN: Frequency/Duration: Follow patient BID for duration of hospital stay to address above goals. Rehabilitation Potential For Stated Goals: Good     RECOMMENDED REHABILITATION/EQUIPMENT: (at time of discharge pending progress): Continue Skilled Therapy. HISTORY:   History of Present Injury/Illness (Reason for Referral):  R TKA  Past Medical History/Comorbidities:   Mr. 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  He also has no past medical history of Aneurysm ([address] Asthma, Autoimmune disease ([address] CAD (coronary artery disease), Cancer ([address] Chronic kidney disease, Chronic obstructive pulmonary disease ([address] Chronic pain, Coagulation disorder ([address] Difficult intubation, Endocarditis, GERD (gastroesophageal reflux disease), Heart failure ([facility] Liver disease, Malignant hyperthermia due to anesthesia, Nausea & vomiting, Nicotine vapor product user, Non-nicotine vapor product user, Pseudocholinesterase deficiency, Psychiatric disorder, PUD (peptic ulcer disease), Rheumatic fever, Seizures ([facility] Sleep apnea, Stroke ([facility] Thromboembolus ([facility] or Thyroid disease. Mr. Valorie Stallings  has a past surgical history that includes hx back surgery (1989); hx hip replacement (Left, 3/1/12); hx cataract removal (Bilateral); hx colonoscopy; ir esophagoscopy rigid/fluoro; and hx afib ablation (2014). Social History/Living Environment:   Home Environment: Private residence  # Steps to Enter: 1  Rails to Enter: No  One/Two Story Residence: One story  Living Alone: No  Support Systems: Spouse/Significant Other/Partner  Patient Expects to be Discharged to[de-identified] Private residence  Current DME Used/Available at Home: Judson Odell, [employer], [facility] chair  Tub or Shower Type: Tub/Shower combination  Prior Level of Function/Work/Activity:  Independent    Number of Personal Factors/Comorbidities that affect the Plan of Care: 0: LOW COMPLEXITY   EXAMINATION:   Most Recent Physical Functioning:      Gross Assessment  AROM: Within functional limits(L LE)  Strength:  Within functional limits(L LE)                RLE Strength  R Hip Flexion: 2+  R Knee Flexion: 2+  R Knee Extension: 2+    Bed Mobility  Supine to Sit: Stand-by assistance    Transfers  Sit to Stand: Stand-by assistance  Stand to Sit: Stand-by assistance  Bed to Chair: Stand-by assistance    Balance  Sitting: Intact  Standing: With support              Weight Bearing Status  Right Side Weight Bearing: As tolerated  Distance (ft): 175 Feet (ft)  Ambulation - Level of Assistance: Stand-by assistance  Assistive Device: Walker, rolling  Base of Support: Center of gravity altered  Speed/Brittney: Pace decreased (<100 feet/min)  Step Length: Left shortened;Right shortened  Stance: Right decreased  Gait Abnormalities: Antalgic  Interventions: Safety awareness training;Verbal cues     Braces/Orthotics: none    Right Knee Cold  Type: Cryocuff      Body Structures Involved:  1. Joints  2. Muscles Body Functions Affected:  1. Movement Related Activities and Participation Affected:  1. Mobility   Number of elements that affect the Plan of Care: 4+: HIGH COMPLEXITY   CLINICAL PRESENTATION:   Presentation: Stable and uncomplicated: LOW COMPLEXITY   CLINICAL DECISION MAKIN[address] [place] [address] AM-PAC 6 Clicks   Basic Mobility Inpatient Short Form  How much difficulty does the patient currently have. .. Unable A Lot A Little None   1. Turning over in bed (including adjusting bedclothes, sheets and blankets)? [] 1   [] 2   [x] 3   [] 4   2. Sitting down on and standing up from a chair with arms ( e.g., wheelchair, bedside commode, etc.)   [] 1   [] 2   [x] 3   [] 4   3. Moving from lying on back to sitting on the side of the bed? [] 1   [] 2   [x] 3   [] 4   How much help from another person does the patient currently need. .. Total A Lot A Little None   4. Moving to and from a bed to a chair (including a wheelchair)? [] 1   [] 2   [x] 3   [] 4   5. Need to walk in hospital room? [] 1   [] 2   [x] 3   [] 4   6. Climbing 3-5 steps with a railing? [] 1   [] 2   [x] 3   [] 4   © , Trustees of [address] [place], [place] [id number], under license to [employer]. All rights reserved     Score:  Initial: 18 Most Recent: X (Date: -- )    Interpretation of Tool:  Represents activities that are increasingly more difficult (i.e. Bed mobility, Transfers, Gait). Medical Necessity:     · Patient is expected to demonstrate progress in   · strength, range of motion, balance, and coordination  ·  to   · increase independence with mobility and HEP.    · .  Reason for Services/Other Comments:  · Patient continues to require skilled intervention due to   · Decreased R LE strength and ROM and decreased independence with mobility s/p TKA. · .   Use of outcome tool(s) and clinical judgement create a POC that gives a: Clear prediction of patient's progress: LOW COMPLEXITY            TREATMENT:   (In addition to Assessment/Re-Assessment sessions the following treatments were rendered)     Pre-treatment Symptoms/Complaints:  Patient agreeable to working with PT. Pain Initial:   Pain Intensity 1: 3  Post Session:  3/10     Gait Training (10 Minutes):  Gait training to improve and/or restore physical functioning as related to mobility, balance and coordination. Ambulated 175 Feet (ft) with Stand-by assistance using a Walker, rolling and minimal Safety awareness training;Verbal cues related to their stance phase and stride length to promote proper body alignment and promote proper body posture. Therapeutic Exercise: (15 Minutes):  Exercises per grid below to improve mobility and strength. Required minimal verbal and tactile cues to promote proper body alignment. Progressed range and repetitions as indicated. Date:  1/15/20 Date:   Date:     ACTIVITY/EXERCISE AM PM AM PM AM PM   GROUP THERAPY  []  []  []  []  []  []   Ankle Pumps 15        Quad Sets 15        Gluteal Sets 15        Hip ABd/ADduction 15        Straight Leg Raises 15        Knee Slides 15        Short Arc Quads 15        Long Arc Quads         Chair Slides                  B = bilateral; AA = active assistive; A = active; P = passive      Treatment/Session Assessment:     Response to Treatment:  Patient participated well and moved well with regards to exercises and gait.      Education:  [] Home Exercises  [x] Fall Precautions  []  [] D/C Instruction Review  [] Knee Prosthesis Review  [x] Walker Management/Safety [] Adaptive Equipment as Needed       Interdisciplinary Collaboration:   o Physical Therapist  o Registered Nurse    After treatment position/precautions:   o Up in chair  o Bed/Chair-wheels locked  o Caregiver at bedside  o Call light within reach  o Family at bedside    Compliance with Program/Exercises: Will assess as treatment progresses. Recommendations/Intent for next treatment session:  Treatment next visit will focus on increasing Mr. Ratliff's independence with bed mobility, transfers, gait training, strength/ROM exercises, modalities for pain, and patient education.       Total Treatment Duration:  PT Patient Time In/Time Out  Time In: 1000  Time Out: [address], MAREK

## 2020-01-15 NOTE — PROGRESS NOTES
[address]  Face to Face Encounter    Patients Name: Lili Sinclair    YOB: 1940    Ordering Physician: Yesi Humphries    Primary Diagnosis: Unilateral primary osteoarthritis, right knee [M17.11]  Arthritis of knee, right [M17.11]   S/p right TKA    Date of Face to Face:   1/14/20                                Face to Face Encounter findings are related to primary reason for home care:   yes. 1. I certify that the patient needs intermittent care as follows: physical therapy: gait/stair training    2. I certify that this patient is homebound, that is: 1) patient requires the use of a walker device, special transportation, or assistance of another to leave the home; or 2) patient's condition makes leaving the home medically contraindicated; and 3) patient has a normal inability to leave the home and leaving the home requires considerable and taxing effort. Patient may leave the home for infrequent and short duration for medical reasons, and occasional absences for non-medical reasons. Homebound status is due to the following functional limitations: Patient's ambulation limited secondary to severe pain and requires the use of an assistive device and the assistance of a caregiver for safe completion. Patient with strength and ROM deficits limiting ambulation endurance requiring the use of an assistive device and the assistance of a caregiver. Patient deemed temporarily homebound secondary to increased risk for infection when leaving home and going out into the community. 3. I certify that this patient is under my care and that I, or a nurse practitioner or  [id number], or clinical nurse specialist, or certified nurse midwife, working with me, had a Face-to-Face Encounter that meets the physician Face-to-Face Encounter requirements.   The following are the clinical findings from the [address] [place] [address] encounter that support the need for skilled services and is a summary of the encounter: see hospital chart        Charu Tyrell, BSW  1/15/2020      THE FOLLOWING TO BE COMPLETED BY THE COMMUNITY PHYSICIAN:    I concur with the findings described above from the F2F encounter that this patient is homebound and in need of a skilled service.     Certifying Physician: _____________________________________      Printed Certifying Physician Name: _____________________________________    Date: _________________

## 2020-01-15 NOTE — PROGRESS NOTES
Care Management Interventions  PCP Verified by CM: Yes  Mode of Transport at Discharge: Self  Transition of Care Consult (CM Consult): [address]: Yes  Discharge Durable Medical Equipment: No  Physical Therapy Consult: Yes  Occupational Therapy Consult: Yes  Current Support Network: Lives with Spouse  Confirm Follow Up Transport: Family  The Plan for Transition of Care is Related to the Following Treatment Goals : return to independant care  The Patient and/or Patient Representative was Provided with a Choice of Provider and Agrees with the Discharge Plan?: Yes  Freedom of Choice List was Provided with Basic Dialogue that Supports the Patient's Individualized Plan of Care/Goals, Treatment Preferences and Shares the Quality Data Associated with the Providers?: Yes  Discharge Location  Discharge Placement: Home with home health  Patient is a 70y.o. year old male admitted for Right TKA . Patient plans to return home on discharge. Order received to arrange home health. Patient without preference towards agency. Referral sent to [facility]. Patient denies any equipment needs as patient has a walker, bedside commode and cane. . Will follow until discharge.

## 2020-01-15 NOTE — PROGRESS NOTES
Problem: Mobility Impaired (Adult and Pediatric)  Goal: *Acute Goals and Plan of Care (Insert Text)  Description  GOALS (1-4 days):  (1.)Mr. Foreign Mcdonald will move from supine to sit and sit to supine  in bed with SUPERVISION. (2.)Mr. Foreign Mcdonald will transfer from bed to chair and chair to bed with SUPERVISION using the least restrictive device. (3.)Mr. Foreign Mcdonald will ambulate with SUPERVISION for 200 feet with the least restrictive device. Met 1/15/20  (4.)Mr. Foreign Mcdonald will ambulate up/down 1 steps without a railing with STAND BY ASSIST with walker. Met 1/15/20  (5.)Mr. Foreign Mcdonald will increase right knee ROM to 5°-80°.  ________________________________________________________________________________________________     Outcome: Progressing Towards Goal     PHYSICAL THERAPY JOINT CAMP TKA: Daily Note and PM 1/15/2020  INPATIENT: Hospital Day: 2  [place] / [place] / Product Type: Medicare /      NAME/AGE/GENDER: Gloria Pascal is a 70 y.o. male   PRIMARY DIAGNOSIS:  Unilateral primary osteoarthritis, right knee [M17.11]   Procedure(s) and Anesthesia Type:     * RIGHT KNEE ARTHROPLASTY TOTAL / [place]  - Spinal (Right)  ICD-10: Treatment Diagnosis:    · Pain in Right Knee (M25.561)  · Stiffness of Right Knee, Not elsewhere classified (M25.661)  · Difficulty in walking, Not elsewhere classified (R26.2)      ASSESSMENT:     Mr. Foreign Mcdonald presents s/p R TKA. Patient demonstrates decreased R LE strength and ROM and decreased independence. Patient is independent at baseline and would benefit from therapy to facilitate a return to prior level. Patient will return home at d/c with assist from his spouse. 1/15/20:  Patient participated well and moved well with regards to exercises and gait. Episode of dizziness/light-headedness earlier today but resolved. PM:  Patient did well with group session. Ambulated increased distance and able to negotiate steps easily. Good ROM.     This section established at most recent assessment   PROBLEM LIST (Impairments causing functional limitations):  1. Decreased Strength  2. Decreased ADL/Functional Activities  3. Decreased Transfer Abilities  4. Decreased Ambulation Ability/Technique  5. Decreased Balance  6. Increased Pain  7. Decreased Flexibility/Joint Mobility  8. Edema/Girth  9. Decreased [place] with Home Exercise Program   INTERVENTIONS PLANNED: (Benefits and precautions of physical therapy have been discussed with the patient.)  1. bed mobility  2. gait training  3. home exercise program (HEP)  4. Range of Motion: active/assisted/passive  5. Therapeutic Activities  6. therapeutic exercise/strengthening  7. transfer training  8. Group Therapy     TREATMENT PLAN: Frequency/Duration: Follow patient BID for duration of hospital stay to address above goals. Rehabilitation Potential For Stated Goals: Good     RECOMMENDED REHABILITATION/EQUIPMENT: (at time of discharge pending progress): Continue Skilled Therapy. HISTORY:   History of Present Injury/Illness (Reason for Referral):  R TKA  Past Medical History/Comorbidities:   Mr. 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  He also has no past medical history of Aneurysm ([address] Asthma, Autoimmune disease ([address] CAD (coronary artery disease), Cancer ([facility] Chronic kidney disease, Chronic obstructive pulmonary disease ([facility] Chronic pain, Coagulation disorder ([facility] Difficult intubation, Endocarditis, GERD (gastroesophageal reflux disease), Heart failure ([address] Liver disease, Malignant hyperthermia due to anesthesia, Nausea & vomiting, Nicotine vapor product user, Non-nicotine vapor product user, Pseudocholinesterase deficiency, Psychiatric disorder, PUD (peptic ulcer disease), Rheumatic fever, Seizures ([facility] Sleep apnea, Stroke ([facility] Thromboembolus ([facility] or Thyroid disease. Mr. Foreign Mcdonald  has a past surgical history that includes hx back surgery (1989); hx hip replacement (Left, 3/1/12); hx cataract removal (Bilateral); hx colonoscopy; ir esophagoscopy rigid/fluoro; and hx afib ablation (2014). Social History/Living Environment:   Home Environment: Private residence  # Steps to Enter: 1  Rails to Enter: No  One/Two Story Residence: One story  Living Alone: No  Support Systems: Spouse/Significant Other/Partner  Patient Expects to be Discharged to[de-identified] Private residence  Current DME Used/Available at Home: Crutches, [place], rollator, [address] [facility] chair  Tub or Shower Type: Tub/Shower combination  Prior Level of Function/Work/Activity:  Independent    Number of Personal Factors/Comorbidities that affect the Plan of Care: 0: LOW COMPLEXITY   EXAMINATION:   Most Recent Physical Functioning:      Gross Assessment  AROM: Within functional limits(L LE)  Strength:  Within functional limits(L LE)        RLE AROM  R Knee Flexion: 102  R Knee Extension: 7       RLE Strength  R Hip Flexion: 2+  R Knee Flexion: 2+  R Knee Extension: 2+    Bed Mobility  Supine to Sit: Stand-by assistance    Transfers  Sit to Stand: Supervision  Stand to Sit: Supervision  Bed to Chair: Stand-by assistance    Balance  Sitting: Intact  Standing: With support              Weight Bearing Status  Right Side Weight Bearing: As tolerated  Distance (ft): 215 Feet (ft)(x 2)  Ambulation - Level of Assistance: Supervision  Assistive Device: Walker, rolling  Base of Support: Center of gravity altered  Speed/Brittney: Pace decreased (<100 feet/min)  Step Length: Left shortened;Right shortened  Stance: Right decreased  Gait Abnormalities: Antalgic  Number of Stairs Trained: 2  Stairs - Level of Assistance: Stand-by assistance  Rail Use: Both(x 1 step; walker x 1 step)  Interventions: Safety awareness training;Verbal cues     Braces/Orthotics: none    Right Knee Cold  Type: Cryocuff      Body Structures Involved:  1. Joints  2. Muscles Body Functions Affected:  1. Movement Related Activities and Participation Affected:  1. Mobility   Number of elements that affect the Plan of Care: 4+: HIGH COMPLEXITY   CLINICAL PRESENTATION:   Presentation: Stable and uncomplicated: LOW COMPLEXITY   CLINICAL DECISION MAKIN[address] [place] [id number] AM-PAC 6 Clicks   Basic Mobility Inpatient Short Form  How much difficulty does the patient currently have. .. Unable A Lot A Little None   1. Turning over in bed (including adjusting bedclothes, sheets and blankets)? [] 1   [] 2   [x] 3   [] 4   2. Sitting down on and standing up from a chair with arms ( e.g., wheelchair, bedside commode, etc.)   [] 1   [] 2   [x] 3   [] 4   3. Moving from lying on back to sitting on the side of the bed? [] 1   [] 2   [x] 3   [] 4   How much help from another person does the patient currently need. .. Total A Lot A Little None   4. Moving to and from a bed to a chair (including a wheelchair)? [] 1   [] 2   [x] 3   [] 4   5. Need to walk in hospital room? [] 1   [] 2   [x] 3   [] 4   6. Climbing 3-5 steps with a railing? [] 1   [] 2   [x] 3   [] 4   © , Trustees of [address] [place] [id number], under license to "[employer].  All rights reserved     Score:  Initial: 18 Most Recent: X (Date: -- )    Interpretation of Tool:  Represents activities that are increasingly more difficult (i.e. Bed mobility, Transfers, Gait).    Medical Necessity:     · Patient is expected to demonstrate progress in   · strength, range of motion, balance, and coordination  ·  to   · increase independence with mobility and HEP. · .  Reason for Services/Other Comments:  · Patient continues to require skilled intervention due to   · Decreased R LE strength and ROM and decreased independence with mobility s/p TKA. · .   Use of outcome tool(s) and clinical judgement create a POC that gives a: Clear prediction of patient's progress: LOW COMPLEXITY            TREATMENT:   (In addition to Assessment/Re-Assessment sessions the following treatments were rendered)     Pre-treatment Symptoms/Complaints:  Patient ready for group session. Pain Initial:   Pain Intensity 1: 4  Post Session:  4/10     Gait Training (15 Minutes):  Gait training to improve and/or restore physical functioning as related to mobility, balance and coordination. Ambulated 215 Feet (ft)(x 2) with Supervision using a Walker, rolling and minimal Safety awareness training;Verbal cues related to their stance phase and stride length to promote proper body alignment and promote proper body posture. Therapeutic Exercise: (45 Minutes(group)):  Exercises per grid below to improve mobility and strength. Required minimal verbal and tactile cues to promote proper body alignment. Progressed range and repetitions as indicated. Date:  1/15/20 Date:   Date:     ACTIVITY/EXERCISE AM PM AM PM AM PM   GROUP THERAPY  []  [x]  []  []  []  []   Ankle Pumps 15 15       Quad Sets 15 15       Gluteal Sets 15 15       Hip ABd/ADduction 15 15       Straight Leg Raises 15 15       Knee Slides 15 15       Short Arc Quads 15 15       Long Arc Quads         Chair Slides  15                B = bilateral; AA = active assistive; A = active; P = passive      Treatment/Session Assessment:     Response to Treatment:  Patient participated well with group session.   Increased gait distance, good stair negotiation, and good ROM. Education:  [x] Home Exercises  [x] Fall Precautions  []  [x] D/C Instruction Review  [x] Knee Prosthesis Review  [x] Walker Management/Safety [] Adaptive Equipment as Needed       Interdisciplinary Collaboration:   o Physical Therapist  o Physical Therapy Assistant  o Registered Nurse  o Rehabilitation Attendant    After treatment position/precautions:   o Up in chair  o Bed/Chair-wheels locked  o Caregiver at bedside  o Call light within reach  o RN notified  o Family at bedside    Compliance with Program/Exercises: Will assess as treatment progresses. Recommendations/Intent for next treatment session:  Treatment next visit will focus on increasing Mr. Blancos independence with bed mobility, transfers, gait training, strength/ROM exercises, modalities for pain, and patient education.       Total Treatment Duration:  PT Patient Time In/Time Out  Time In: 1300  Time Out: [facility]

## 2020-01-15 NOTE — DISCHARGE INSTRUCTIONS
[facility]   Patient Discharge Instructions    Dimitrios Espinal / [id number] : 1940    Admitted 2020 Discharged: 1/15/2020     IF YOU HAVE ANY PROBLEMS ONCE YOU ARE AT HOME CALL THE FOLLOWING NUMBERS:   Main office number: [phone]    Take Home Medications     · It is important that you take the medication exactly as they are prescribed. · Keep your medication in the bottles provided by the pharmacist and keep a list of the medication names, dosages, and times to be taken in your wallet. · Do not take other medications without consulting your doctor. What to do at [address] your prehospital diet. If you have excessive nausea or vomitting call your doctor's office     Home Physical Therapy is arranged. Use rolling walker when walking. Patients who have had a joint replacement should not drive until you are seen for your follow up appointment by Dr. Olivia Warren. When to Call    - Call if you have a temperature greater then 101  - Unable to keep food down  - Loose control of your bladder or bowel function  - Are unable to bear any weight   - Need a pain medication refill       DISCHARGE SUMMARY from Nurse    The following personal items collected during your admission are returned to you:   Dental Appliance: Dental Appliances: None  Vision: Visual Aid: Glasses  Hearing Aid:   na  Jewelry: Jewelry: None  Clothing: Clothing: Other (comment)  Other Valuables: Other Valuables: Cell Phone, Wallet(PT'S WIFE HAS PHONE AND WALLET)  Valuables sent to safe:      PATIENT INSTRUCTIONS:    After general anesthesia or intravenous sedation, for 24 hours or while taking prescription Narcotics:  · Limit your activities  · Do not drive and operate hazardous machinery  · Do not make important personal or business decisions  · Do  not drink alcoholic beverages  · If you have not urinated within 8 hours after discharge, please contact your surgeon on call.     Report the following to your surgeon:  · Excessive pain, swelling, redness or odor of or around the surgical area  · Temperature over 101  · Nausea and vomiting lasting longer than 4 hours or if unable to take medications  · Any signs of decreased circulation or nerve impairment to extremity: change in color, persistent  numbness, tingling, coldness or increase pain  · Any questions, call office @ [phone]      Keep scheduled follow up appointment. If need to change, call office @ [phone]. *  Please give a list of your current medications to your Primary Care Provider. *  Please update this list whenever your medications are discontinued, doses are      changed, or new medications (including over-the-counter products) are added. *  Please carry medication information at all times in case of emergency situations. Patient Education        Total Knee Replacement: What to Expect at Home  Your Recovery    When you leave the hospital, you should be able to move around with a walker or crutches. But you will need someone to help you at home for the next few weeks or until you have more energy and can move around better. If you need more extensive rehab, you may go to a specialized rehab center for more treatment. You will go home with a bandage and stitches, staples, tissue glue, or tape strips. Change the bandage as your doctor tells you to. If you have stitches or staples, your doctor will remove them 10 to 21 days after your surgery. Glue or tape strips will fall off on their own over time. You may still have some mild pain, and the area may be swollen for 3 to 6 months after surgery. Your knee will continue to improve for 6 to 12 months. You will probably use a walker for 1 to 3 weeks and then use crutches. When you are ready, you can use a cane. You will probably be able to walk on your own in 4 to 8 weeks. You will need to do months of physical rehabilitation (rehab) after a knee replacement.  Rehab will help you strengthen the muscles of the knee and help you regain movement. After you recover, your artificial knee will allow you to do normal daily activities with less pain or no pain at all. You may be able to hike, dance, ride a bike, and play golf. Talk to your doctor about whether you can do more strenuous activities. Always tell your caregivers that you have an artificial knee. How long it will take to walk on your own, return to normal activities, and go back to work depends on your health and how well your rehabilitation (rehab) program goes. The better you do with your rehab exercises, the quicker you will get your strength and movement back. This care sheet gives you a general idea about how long it will take for you to recover. But each person recovers at a different pace. Follow the steps below to get better as quickly as possible. How can you care for yourself at home? Activity    · Rest when you feel tired. You may take a nap, but do not stay in bed all day. When you sit, use a chair with arms. You can use the arms to help you stand up.     · Work with your physical therapist to find the best way to exercise. What you can do as your knee heals will depend on whether your new knee is cemented or uncemented. You may not be able to do certain things for a while if your new knee is uncemented.     · After your knee has healed enough, you can do more strenuous activities with caution. ? You can golf, but use a golf cart, and do not wear shoes with spikes. ? You can bike on a flat road or on a stationary bike. Avoid biking up hills. ? Your doctor may suggest that you stay away from activities that put stress on your knee. These include tennis or badminton, squash or racquetball, contact sports like football, jumping (such as in basketball), jogging, or running. ? Avoid activities where you might fall. These include horseback riding, skiing, and mountain biking.     · Do not sit for more than 1 hour at a time. Get up and walk around for a while before you sit again. If you must sit for a long time, prop up your leg with a chair or footstool. This will help you avoid swelling.     · Ask your doctor when you can drive again. It may take up to 8 weeks after knee replacement surgery before it is safe for you to drive.     · When you get into a car, sit on the edge of the seat. Then pull in your legs, and turn to face the front.     · You should be able to do many everyday activities 3 to 6 weeks after your surgery. You will probably need to take 4 to 16 weeks off from work. When you can go back to work depends on the type of work you do and how you feel.     · Ask your doctor when it is okay for you to have sex.     · Do not lift anything heavier than 10 pounds and do not lift weights for 12 weeks. Diet    · By the time you leave the hospital, you should be eating your normal diet. If your stomach is upset, try bland, low-fat foods like plain rice, broiled chicken, toast, and yogurt. Your doctor may suggest that you take iron and vitamin supplements.     · Drink plenty of fluids (unless your doctor tells you not to).   · Eat healthy foods, and watch your portion sizes. Try to stay at your ideal weight. Too much weight puts more stress on your new knee.     · You may notice that your bowel movements are not regular right after your surgery. This is common. Try to avoid constipation and straining with bowel movements. You may want to take a fiber supplement every day. If you have not had a bowel movement after a couple of days, ask your doctor about taking a mild laxative. Medicines    · Your doctor will tell you if and when you can restart your medicines. He or she will also give you instructions about taking any new medicines.     · If you take blood thinners, such as warfarin (Coumadin), clopidogrel (Plavix), or aspirin, be sure to talk to your doctor.  He or she will tell you if and when to start taking those medicines again. Make sure that you understand exactly what your doctor wants you to do.     · Your doctor may give you a blood-thinning medicine to prevent blood clots. If you take a blood thinner, be sure you get instructions about how to take your medicine safely. Blood thinners can cause serious bleeding problems. This medicine could be in pill form or as a shot (injection). If a shot is necessary, your doctor will tell you how to do this.     · Be safe with medicines. Take pain medicines exactly as directed. ? If the doctor gave you a prescription medicine for pain, take it as prescribed. ? If you are not taking a prescription pain medicine, ask your doctor if you can take an over-the-counter medicine. ? Plan to take your pain medicine 30 minutes before exercises. It is easier to prevent pain before it starts than to stop it once it has started.     · If you think your pain medicine is making you sick to your stomach:  ? Take your medicine after meals (unless your doctor has told you not to). ? Ask your doctor for a different pain medicine.     · If your doctor prescribed antibiotics, take them as directed. Do not stop taking them just because you feel better. You need to take the full course of antibiotics. Incision care    · If your doctor told you how to care for your cut (incision), follow your doctor's instructions. You will have a dressing over the cut. A dressing helps the incision heal and protects it. Your doctor will tell you how to take care of this.     · If you did not get instructions, follow this general advice:  ? If you have strips of tape on the cut the doctor made, leave the tape on for a week or until it falls off.  ? If you have stitches or staples, your doctor will tell you when to come back to have them removed. ? If you have skin adhesive on the cut, leave it on until it falls off. Skin adhesive is also called glue or liquid stitches. ? Change the bandage every day. ?  Wash the area daily with warm water, and pat it dry. Don't use hydrogen peroxide or alcohol. They can slow healing. ? You may cover the area with a gauze bandage if it oozes fluid or rubs against clothing. ? You may shower 24 to 48 hours after surgery. Pat the incision dry. Don't swim or take a bath for the first 2 weeks, or until your doctor tells you it is okay. Exercise    · Your rehab program will give you a number of exercises to do to help you get back your knee's range of motion and strength. Always do them as your therapist tells you. Ice and elevation    · For pain and swelling, put ice or a cold pack on the area for 10 to 20 minutes at a time. Put a thin cloth between the ice and your skin. Other instructions    · Continue to wear your support stockings as your doctor says. These help to prevent blood clots. The length of time that you will have to wear them depends on your activity level and the amount of swelling.     · You have metal pieces in your knee. These may set off some airport metal detectors. Carry a medical alert card that says you have an artificial joint, just in case. Follow-up care is a key part of your treatment and safety. Be sure to make and go to all appointments, and call your doctor if you are having problems. It's also a good idea to know your test results and keep a list of the medicines you take. When should you call for help? Call 911 anytime you think you may need emergency care. For example, call if:    · You passed out (lost consciousness).     · You have severe trouble breathing.     · You have sudden chest pain and shortness of breath, or you cough up blood.    Call your doctor now or seek immediate medical care if:    · You have signs of infection, such as:  ? Increased pain, swelling, warmth, or redness. ? Red streaks leading from the incision. ? Pus draining from the incision. ?  A fever.     · You have signs of a blood clot, such as:  ? Pain in your calf, back of the knee, thigh, or groin. ? Redness and swelling in your leg or groin.     · Your incision comes open and begins to bleed, or the bleeding increases.     · You have pain that does not get better after you take pain medicine.    Watch closely for changes in your health, and be sure to contact your doctor if:    · You do not have a bowel movement after taking a laxative. Where can you learn more? Go to http://harper-mayco.info/. Enter T688 in the search box to learn more about \"Total Knee Replacement: What to Expect at Home. \"  Current as of: June 26, 2019  Content Version: 12.2  © 0826-5624 [employer]. Care instructions adapted under license by [employer] (which disclaims liability or warranty for this information). If you have questions about a medical condition or this instruction, always ask your healthcare professional. [address] any warranty or liability for your use of this information. These are general instructions for a healthy lifestyle:    No smoking/ No tobacco products/ Avoid exposure to second hand smoke    Surgeon General's Warning:  Quitting smoking now greatly reduces serious risk to your health. Obesity, smoking, and sedentary lifestyle greatly increases your risk for illness    A healthy diet, regular physical exercise & weight monitoring are important for maintaining a healthy lifestyle    You may be retaining fluid if you have a history of heart failure or if you experience any of the following symptoms:  Weight gain of 3 pounds or more overnight or 5 pounds in a week, increased swelling in our hands or feet or shortness of breath while lying flat in bed. Please call your doctor as soon as you notice any of these symptoms; do not wait until your next office visit.     Recognize signs and symptoms of STROKE:    F-face looks uneven    A-arms unable to move or move even    S-speech slurred or non-existent    T-time-call 911 as soon as signs and symptoms begin-DO NOT go       Back to bed or wait to see if you get better-TIME IS BRAIN. The discharge information has been reviewed with the patient. The patient verbalized understanding. Information obtained by :  I understand that if any problems occur once I am at home I am to contact my physician. I understand and acknowledge receipt of the instructions indicated above.                                                                                                                                            Physician's or R.N.'s Signature                                                                  Date/Time                                                                                                                                              Patient or Representative Signature                                                          Date/Time

## 2020-01-15 NOTE — PROGRESS NOTES
Asked to come see patient today for DM recommendations but has been discharged before I could see. No charge for this input.

## 2020-01-15 NOTE — PROGRESS NOTES
Patient received resting in bed. Medicated for c/o right knee pain (7/10) with roxicodone 10mg po per MD order. Shift assessment completed. Patient's wife at bedside, will monitor.

## 2020-01-15 NOTE — PROGRESS NOTES
Problem: Self Care Deficits Care Plan (Adult)  Goal: *Acute Goals and Plan of Care (Insert Text)  Description  GOALS:   DISCHARGE GOALS (in preparation for going home/rehab):  3 days  1. Mr. Slime Youssef will perform one lower body dressing activity with minimal assistance required to demonstrate improved functional mobility and safety. -GOAL MET 1/15/2020  2. Mr. Slime Youssef will perform one lower body bathing activity with minimal assistance required to demonstrate improved functional mobility and safety. -GOAL MET 1/15/2020   3. Mr. Slime Youssef will perform toileting/toilet transfer with contact guard assistance to demonstrate improved functional mobility and safety. -GOAL MET 1/15/2020   4. Mr. Slime Youssef will perform shower transfer with contact guard assistance to demonstrate improved functional mobility and safety. -GOAL MET 1/15/2020        JOINT CAMP OCCUPATIONAL THERAPY TKA: Daily Note and Discharge 1/15/2020  INPATIENT: Hospital Day: 2  [place] / [place] PART [place] B / Product Type: Medicare /      NAME/AGE/GENDER: Paulo Kathleen is a 70 y.o. male   PRIMARY DIAGNOSIS:  Unilateral primary osteoarthritis, right knee [M17.11]   Procedure(s) and Anesthesia Type:     * RIGHT KNEE ARTHROPLASTY TOTAL / [facility] - Spinal (Right)  ICD-10: Treatment Diagnosis:    · Pain in Right Knee (M25.561)  · Stiffness of Right Knee, Not elsewhere classified (F40.274)      ASSESSMENT:      Mr. Slime Youssef is s/p Right TKA and presents with decreased weight bearing on R LE and decreased independence with functional mobility and activities of daily living. Patient completed shower and dressing as charter below in ADL grid and is ambulating with rolling walker and stand by assist.  Patient has met 4/4 goals and plans to return home with good family support. Family able to provide patient with appropriate level of assistance at this time.     Will do well at home for self cares and transfers during ADL's.  D/C OT for acute deficits. This section established at most recent assessment   PROBLEM LIST (Impairments causing functional limitations):  1. Decreased Strength  2. Decreased ADL/Functional Activities  3. Decreased Transfer Abilities  4. Increased Pain  5. Increased Fatigue  6. Decreased Flexibility/Joint Mobility  7. Decreased Knowledge of Precautions   INTERVENTIONS PLANNED: (Benefits and precautions of occupational therapy have been discussed with the patient.)  1. Activities of daily living training  2. Adaptive equipment training  3. Balance training  4. Clothing management  5. Donning&doffing training  6. Theraputic activity     TREATMENT PLAN: Frequency/Duration: Follow patient 1-2tx to address above goals. Rehabilitation Potential For Stated Goals: Excellent     RECOMMENDED REHABILITATION/EQUIPMENT: (at time of discharge pending progress): Continue Skilled Therapy. OCCUPATIONAL PROFILE AND HISTORY:   History of Present Injury/Illness (Reason for Referral): Pt presents this date s/p (right) TKA. Past Medical History/Comorbidities:   Mr. 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  He also has no past medical history of Aneurysm ([address] Asthma, Autoimmune disease ([address] CAD (coronary artery disease), Cancer ([address] Chronic kidney disease, Chronic obstructive pulmonary disease (HCC), Chronic pain, Coagulation disorder ([address] Difficult intubation, Endocarditis, GERD (gastroesophageal reflux disease), Heart failure ([address] Liver disease, Malignant hyperthermia due to anesthesia, Nausea & vomiting, Nicotine vapor product user, Non-nicotine vapor product user, Pseudocholinesterase deficiency, Psychiatric disorder, PUD (peptic ulcer disease), Rheumatic fever, Seizures ([address] Sleep apnea, Stroke ([address] Thromboembolus ([address] or Thyroid disease. Mr. Ashley Sanderson  has a past surgical history that includes hx back surgery (1989); hx hip replacement (Left, 3/1/12); hx cataract removal (Bilateral); hx colonoscopy; ir esophagoscopy rigid/fluoro; and hx afib ablation (2014). Social History/Living Environment:   Home Environment: Private residence  # Steps to Enter: 1  Rails to Enter: No  One/Two Story Residence: One story  Living Alone: No  Support Systems: Spouse/Significant Other/Partner  Patient Expects to be Discharged to[de-identified] Private residence  Current DME Used/Available at Home: Crutches, Emery Dynes, rollator, [address] chair  Tub or Shower Type: Tub/Shower combination  Prior Level of Function/Work/Activity:  Independent prior. Number of Personal Factors/Comorbidities that affect the Plan of Care: Brief history (0):  LOW COMPLEXITY   ASSESSMENT OF OCCUPATIONAL PERFORMANCE[de-identified]   Most Recent Physical Functioning:   Balance  Sitting: Intact  Standing: With support       Gross Assessment  AROM: Within functional limits(L LE)  Strength:  Within functional limits(L LE)                                 RLE Strength  R Hip Flexion: 2+  R Knee Flexion: 2+  R Knee Extension: 2+     Basic ADLs (From Assessment) Complex ADLs (From Assessment)   Basic ADL  Feeding: Independent  Oral Facial Hygiene/Grooming: Independent  Bathing: Stand-by assistance  Type of Bath: Chlorhexidine (CHG), Full, Shower  Upper Body Dressing: Independent  Lower Body Dressing: Minimum assistance  Toileting: Stand by assistance     Grooming/Bathing/Dressing Activities of Daily Living   Grooming  Grooming Assistance: Set-up     Upper Body Bathing  Bathing Assistance: Supervision     Lower Body Bathing  Bathing Assistance: Stand-by assistance     Upper Body Dressing Assistance  Dressing Assistance: Set-up Functional Transfers  Bathroom Mobility: Stand-by assistance  Toilet Transfer : Stand-by assistance  Shower Transfer: Stand-by assistance   Lower Body Dressing Assistance  Dressing Assistance: Minimum assistance  Underpants: Stand-by assistance  Pants With Elastic Waist: Stand-by assistance  Socks: Minimum assistance Bed/Mat Mobility  Supine to Sit: Stand-by assistance  Sit to Stand: Stand-by assistance  Stand to Sit: Stand-by assistance  Bed to Chair: Stand-by assistance         Physical Skills Involved:  1. Range of Motion  2. Balance  3. Strength Cognitive Skills Affected (resulting in the inability to perform in a timely and safe manner):  1. [facility]  Psychosocial Skills Affected:  1. WFL    Number of elements that affect the Plan of Care: 1-3:  LOW COMPLEXITY   CLINICAL DECISION MAKING:   [employer] AM-PAC 6 Clicks   Daily Activity Inpatient Short Form  How much help from another person does the patient currently need. .. Total A Lot A Little None   1. Putting on and taking off regular lower body clothing? [] 1   [] 2   [x] 3   [] 4   2. Bathing (including washing, rinsing, drying)? [] 1   [] 2   [x] 3   [] 4   3. Toileting, which includes using toilet, bedpan or urinal?   [] 1   [] 2   [x] 3   [] 4   4. Putting on and taking off regular upper body clothing? [] 1   [] 2   [] 3   [x] 4   5. Taking care of personal grooming such as brushing teeth? [] 1   [] 2   [] 3   [x] 4   6. Eating meals? [] 1   [] 2   [] 3   [x] 4   © 2007, Trustees of [facility] [employer], under license to [employer].  All rights reserved     Score:  Initial: 18 Most Recent: 21 (Date: 1/15/2020 ) Interpretation of Tool:  Represents activities that are increasingly more difficult (i.e. Bed mobility, Transfers, Gait). Medical Necessity:     · Skilled intervention continues to be required due to Deficits noted above. Reason for Services/Other Comments:  · Patient continues to require skilled intervention due to   · New TKA   · . Use of outcome tool(s) and clinical judgement create a POC that gives a: MODERATE COMPLEXITY            TREATMENT:   (In addition to Assessment/Re-Assessment sessions the following treatments were rendered)     Pre-treatment Symptoms/Complaints:    Pain: Initial:   Pain Intensity 1: 2  Pain Location 1: Knee  Post Session:  4     Self Care: (39): Procedure(s) (per grid) utilized to improve and/or restore self-care/home management as related to dressing, bathing, toileting and grooming. Required minimal verbal cueing to facilitate activities of daily living skills. Treatment/Session Assessment:     Response to Treatment:  Good, sitting up in recliner. Education:  [] Home Exercises  [x] Fall Precautions  [] Hip Precautions [] Going Home Video  [x] Knee/Hip Prosthesis Review  [x] Walker Management/Safety [x] Adaptive Equipment as Needed       Interdisciplinary Collaboration:   o Physical Therapist  o Occupational Therapist  o Registered Nurse    After treatment position/precautions:   o Up in chair  o Bed/Chair-wheels locked  o Caregiver at bedside  o Call light within reach  o RN notified     Compliance with Program/Exercises: Compliant all of the time, Will assess as treatment progresses. Recommendations/Intent for next treatment session:  D/C Ot for acute deficits.       Total Treatment Duration:  OT Patient Time In/Time Out  Time In: 0901  Time Out: [address], OT

## 2020-01-15 NOTE — PROGRESS NOTES
Care Transition Nurse Hospital Outreach      Date/Time:  1/15/2020 2:01 PM    Patient is identified as High Risk for Readmission. Medical History:   HTN  Atrial- fib  DM  Osteoarthritis  Arthritis of right knee  Anticoagulant long term-use          Advanced Care Planning:    Does patient have an Advance Directive:  not on file          Inpatient RRAT score: 24  Was this a readmission? no       Care Transition Nurse (CTN) introduced self to the patient. Verified name and  with patient as identifiers. Provided explanation of the CTN role. Patients top risk factors for readmission:  functional physical ability, medical condition    Medication Reconciliation:   Medication reconciliation was performed with patient, who verbalizes understanding of administration of home medications. There were no barriers to obtaining medications identified at this time. No current facility-administered medications for this visit. Current Outpatient Medications   Medication Sig    [START ON 2020] rivaroxaban (XARELTO) 20 mg tab tablet Take 1 Tab by mouth daily (with dinner).  aspirin delayed-release 81 mg tablet Take 1 Tab by mouth every twelve (12) hours every twelve (12) hours for 4 days.  oxyCODONE IR (ROXICODONE) 5 mg immediate release tablet Take 1-2 Tabs by mouth every four (4) hours as needed for Pain for up to 7 days. Max Daily Amount: 60 mg.      Facility-Administered Medications Ordered in Other Visits   Medication Dose Route Frequency    dilTIAZem CD (CARDIZEM CD) capsule 120 mg  120 mg Oral QHS    famotidine (PEPCID) tablet 20 mg  20 mg Oral BID    flecainide (TAMBOCOR) tablet 100 mg  100 mg Oral BID    tamsulosin (FLOMAX) capsule 0.4 mg  0.4 mg Oral QHS    alcohol 62% (NOZIN) nasal  1 Ampule  1 Ampule Topical Q12H    0.9% sodium chloride infusion  100 mL/hr IntraVENous CONTINUOUS    sodium chloride (NS) flush 5-40 mL  5-40 mL IntraVENous Q8H    sodium chloride (NS) flush 5-40 mL  5-40 mL IntraVENous PRN    acetaminophen (TYLENOL) tablet 1,000 mg  1,000 mg Oral Q6H    oxyCODONE IR (ROXICODONE) tablet 5-10 mg  5-10 mg Oral Q4H PRN    HYDROmorphone (PF) (DILAUDID) injection 1 mg  1 mg IntraVENous Q3H PRN    naloxone (NARCAN) injection 0.2-0.4 mg  0.2-0.4 mg IntraVENous Q10MIN PRN    dexamethasone (DECADRON) injection 10 mg  10 mg IntraVENous ONCE    promethazine (PHENERGAN) tablet 25 mg  25 mg Oral Q6H PRN    diphenhydrAMINE (BENADRYL) capsule 25 mg  25 mg Oral Q4H PRN    senna-docusate (PERICOLACE) 8.6-50 mg per tablet 2 Tab  2 Tab Oral DAILY    aspirin delayed-release tablet 81 mg  81 mg Oral Q12H    ondansetron (ZOFRAN ODT) tablet 8 mg  8 mg Oral Q8H PRN    lisinopril (PRINIVIL, ZESTRIL) tablet 10 mg  10 mg Oral DAILY    ferrous sulfate tablet 325 mg  1 Tab Oral DAILY WITH DINNER    insulin regular (NOVOLIN R, HUMULIN R) injection 10 Units  10 Units SubCUTAneous TIDAC       Discharge Plan: Patient to discharge home today with [facility] PT. CTN to follow up for [facility] call per protocol. Goals    None        This note will not be viewable in MyChart.

## 2020-01-15 NOTE — DISCHARGE SUMMARY
[facility]  Total Joint Discharge Summary      Patient ID:  Amy Juan  [id number]  26 y.o.  1940    Admit date: 1/14/2020  Discharge date and time: 1-15-20  Admitting Physician: Tsering Rios MD  Surgeon: Same  Admission Diagnoses: Unilateral primary osteoarthritis, right knee [M17.11]  Arthritis of knee, right [M17.11]  Discharge Diagnoses: Principal Problem:    Status post total right knee replacement (1/14/2020)    Active Problems:    Diabetes ([address] ()      Overview: Type 2. BS run between(100-170). Low BS s/sx:weak, shakes, =bs below 60. Hypertension ()      Overview: on medication. Atrial fibrillation, paroxysmal (HCC) ()      Overview: No bleeding. Able to get Eliquis from company      Obesity (2/11/2016)      Anticoagulant long-term use (11/18/2016)      Arthritis of knee, right (1/14/2020)                                Perioperative Antibiotics: Ancef 1 to 2 mg was given depending on patient's weight. If allergic to Ancef or due to other indications, patient was given Vancomycin. Hospital Medications given:   [unfilled]  [unfilled]  [unfilled]    Discharge Medications given:  Current Discharge Medication List      START taking these medications    Details   aspirin delayed-release 81 mg tablet Take 1 Tab by mouth every twelve (12) hours every twelve (12) hours for 4 days. Qty: 8 Tab, Refills: 0      oxyCODONE IR (ROXICODONE) 5 mg immediate release tablet Take 1-2 Tabs by mouth every four (4) hours as needed for Pain for up to 7 days. Max Daily Amount: 60 mg.  Qty: 60 Tab, Refills: 0    Associated Diagnoses: Status post total right knee replacement         CONTINUE these medications which have CHANGED    Details   rivaroxaban (XARELTO) 20 mg tab tablet Take 1 Tab by mouth daily (with dinner).   Qty: 90 Tab, Refills: 3         CONTINUE these medications which have NOT CHANGED    Details   famotidine (PEPCID) 20 mg tablet Take 20 mg by mouth two (2) times a day. Take / use AM day of surgery  per anesthesia protocols. Indications: gastroesophageal reflux disease      ferrous sulfate (IRON) 325 mg (65 mg iron) EC tablet Take 1 Tab by mouth daily. Qty: 30 Tab, Refills: 5      tamsulosin (FLOMAX) 0.4 mg capsule TAKE 1 CAPSULE BY MOUTH ONCE DAILY  Qty: 90 Cap, Refills: 2    Associated Diagnoses: Benign prostatic disease      dilTIAZem CD (CARDIZEM CD) 120 mg ER capsule Take 1 Cap by mouth daily. Qty: 90 Cap, Refills: 1      insulin regular (NOVOLIN R REGULAR U-100 INSULN) 100 unit/mL injection INJECT SUBCUTANEOUSLY 14 UNITS WITH EACH MEAL AS DIRECTED  Qty: 10 mL, Refills: 11    Associated Diagnoses: Type 2 diabetes mellitus without complication, with long-term current use of insulin ([facility])      enalapril (VASOTEC) 10 mg tablet TAKE 1 TABLET BY MOUTH DAILY  Qty: 90 Tab, Refills: 3    Associated Diagnoses: Essential hypertension      Insulin Needles, Disposable, (COMFORT EZ PEN NEEDLES) 31 gauge x 5/16\" ndle Use five times per day for insulin injection  Qty: 1 Package, Refills: 11    Associated Diagnoses: Type 2 diabetes mellitus without complication, with long-term current use of insulin ([facility])      insulin NPH (NOVOLIN N NPH U-100 INSULIN) 100 unit/mL injection Take 10 units bid with meals  Qty: 1 Vial, Refills: 11    Associated Diagnoses: Type 2 diabetes mellitus without complication, with long-term current use of insulin ([facility])      flecainide (TAMBOCOR) 100 mg tablet Take 1 Tab by mouth two (2) times a day. Qty: 180 Tab, Refills: 3      Blood Sugar Diagnostic, Drum (ACCU-CHEK COMPACT TEST) strp 1 Strip by IntraLESional route two (2) times a day.   Qty: 200 Strip, Refills: 3    Associated Diagnoses: Insulin-requiring or dependent type II diabetes mellitus (HCC)      glucose blood VI test strips (ACCU-CHEK COMPACT TEST) strip Check blood sugars twice daily  Qty: 100 Each, Refills: 11    Associated Diagnoses: Type 2 diabetes mellitus without complication ([address] Insulin Syringe-Needle U-100 (BD INSULIN SYRINGE ULT-FINE II) 1/2 mL 31 x 5/16\" syrg by Does Not Apply route. meclizine (ANTIVERT) 25 mg tablet Take 1 Tab by mouth three (3) times daily as needed for Dizziness. Qty: 30 Tab, Refills: 1         STOP taking these medications       diclofenac (VOLTAREN) 1 % gel Comments:   Reason for Stopping:                Additional DVT Prophylaxis:  CAROLYN Hose,Plexi-Pulse    Postoperative transfusions:   none  Post Op complications: none    Hemoglobin at discharge:   Lab Results   Component Value Date/Time    HGB 11.9 (L) 01/14/2020 07:15 PM       Wound appears to be healing without any evidence of infection. Physical Therapy started on the day following surgery and progressed to independent ambulation with the aid of a walker. At the time of discharge, able to go up and down stairs and had understanding of precautions needed following surgery.       PT/OT:            Assistive Device: Walker (comment)                Discharged to: home    Discharge instructions:  -Rx pain medication given  - Anticoagulate with: resume Xarelto on Friday   -Resume pre hospital diet             -Resume home medications per medical continuation form     -Ambulate with walker, appropriate total joint protocol  -Follow up in office as scheduled       Signed:  KRISTINA Parekh  1/15/2020  7:36 AM

## 2020-01-16 ENCOUNTER — HOME CARE VISIT (OUTPATIENT)
Dept: SCHEDULING | Facility: HOME HEALTH | Age: 80
End: 2020-01-16
Payer: MEDICARE

## 2020-01-16 ENCOUNTER — PATIENT OUTREACH (OUTPATIENT)
Dept: CASE MANAGEMENT | Age: 80
End: 2020-01-16

## 2020-01-16 VITALS
TEMPERATURE: 98.2 F | RESPIRATION RATE: 18 BRPM | DIASTOLIC BLOOD PRESSURE: 60 MMHG | SYSTOLIC BLOOD PRESSURE: 160 MMHG | HEART RATE: 56 BPM

## 2020-01-16 PROCEDURE — A4649 SURGICAL SUPPLIES: HCPCS

## 2020-01-16 PROCEDURE — 3331090002 HH PPS REVENUE DEBIT

## 2020-01-16 PROCEDURE — 400013 HH SOC

## 2020-01-16 PROCEDURE — G0151 HHCP-SERV OF PT,EA 15 MIN: HCPCS

## 2020-01-16 PROCEDURE — 3331090001 HH PPS REVENUE CREDIT

## 2020-01-16 NOTE — PROGRESS NOTES
Transition of Care Hospital Discharge Follow-Up      Date/Time:  2020 12:44 PM    Patient was admitted to [facility] on 2020 and discharged on 1/15/2020 for total right knee replacement. The physician discharge summary was available at the time of outreach. Patient was contacted within 1 business days of discharge. Advance Care Planning:   Does patient have an Advance Directive:  not on file          Inpatient RRAT score: 21  Was this a readmission? no   Patient stated reason for the readmission: n/a    Care Transition Nurse (CTN) contacted the patient by telephone to perform post hospital discharge assessment. Verified name and  with patient as identifiers. Provided introduction to self, and explanation of the CTN role. Patient received hospital discharge instructions. CTN reviewed discharge instructions and red flags with patient who verbalized understanding. Patient given an opportunity to ask questions and does not have any further questions or concerns at this time. The patient agrees to contact the PCP office for questions related to their healthcare. CTN provided contact information for future reference. Disease Specific:   N/A    Patients top risk factors for readmission:  functional physical ability    Home Health orders at discharge: [facility]: [facility]  Date of initial visit: 2020-per patient scheduled to visit 1:30 pm    Durable Medical Equipment ordered at discharge: none  [address] received: n/a    Medication(s):   Medications at Discharge: see below    Medication reconciliation was performed with patient, who verbalizes understanding of administration of home medications. There were no barriers to obtaining medications identified at this time.       Current Outpatient Medications   Medication Sig    [START ON 2020] rivaroxaban (XARELTO) 20 mg tab tablet Take 1 Tab by mouth daily (with dinner).  aspirin delayed-release 81 mg tablet Take 1 Tab by mouth every twelve (12) hours every twelve (12) hours for 4 days.  oxyCODONE IR (ROXICODONE) 5 mg immediate release tablet Take 1-2 Tabs by mouth every four (4) hours as needed for Pain for up to 7 days. Max Daily Amount: 60 mg.    famotidine (PEPCID) 20 mg tablet Take 20 mg by mouth two (2) times a day. Take / use AM day of surgery  per anesthesia protocols. Indications: gastroesophageal reflux disease    ferrous sulfate (IRON) 325 mg (65 mg iron) EC tablet Take 1 Tab by mouth daily. (Patient taking differently: Take 325 mg by mouth daily (with dinner). Indications: anemia from inadequate iron)    tamsulosin (FLOMAX) 0.4 mg capsule TAKE 1 CAPSULE BY MOUTH ONCE DAILY (Patient taking differently: Take 0.4 mg by mouth nightly. Take / use AM day of surgery  per anesthesia protocols. Indications: enlarged prostate with urination problem)    dilTIAZem CD (CARDIZEM CD) 120 mg ER capsule Take 1 Cap by mouth daily. (Patient taking differently: Take 120 mg by mouth nightly. Indications: Ventricular Rate Control in Atrial Fibrillation)    insulin regular (NOVOLIN R REGULAR U-100 INSULN) 100 unit/mL injection INJECT SUBCUTANEOUSLY 14 UNITS WITH EACH MEAL AS DIRECTED (Patient taking differently: 14 Units by SubCUTAneous route once. INJECT SUBCUTANEOUSLY 14 UNITS WITH EACH MEAL AS DIRECTED  Indications: type 2 diabetes mellitus)    enalapril (VASOTEC) 10 mg tablet TAKE 1 TABLET BY MOUTH DAILY (Patient taking differently: Take 10 mg by mouth nightly. TAKE 1 TABLET BY MOUTH DAILY; Indications: high blood pressure)    Insulin Needles, Disposable, (COMFORT EZ PEN NEEDLES) 31 gauge x 5/16\" ndle Use five times per day for insulin injection    meclizine (ANTIVERT) 25 mg tablet Take 1 Tab by mouth three (3) times daily as needed for Dizziness.     insulin NPH (NOVOLIN N NPH U-100 INSULIN) 100 unit/mL injection Take 10 units bid with meals (Patient taking differently: 10 Units by SubCUTAneous route two (2) times daily (with meals). Take 10 units bid with meals  Indications: type 2 diabetes mellitus)    flecainide (TAMBOCOR) 100 mg tablet Take 1 Tab by mouth two (2) times a day. (Patient taking differently: Take 100 mg by mouth two (2) times a day. Take / use AM day of surgery  per anesthesia protocols. Indications: Sudden Recurrence/Intensity of Rapid Regular Heartbeats)    Blood Sugar Diagnostic, Drum (ACCU-CHEK COMPACT TEST) strp 1 Strip by IntraLESional route two (2) times a day.  glucose blood VI test strips (ACCU-CHEK COMPACT TEST) strip Check blood sugars twice daily    Insulin Syringe-Needle U-100 (BD INSULIN SYRINGE ULT-FINE II) 1/2 mL 31 x 5/16\" syrg by Does Not Apply route. No current facility-administered medications for this visit. There are no discontinued medications. [facility] follow up appointment(s):   Future Appointments   Date Time Provider Mckenzie Ruiz   1/16/2020  1:30 PM Bianca Cartagena, [facility]   4/29/2020  1:45 PM Lyudmila Soriano MD [facility] [address]      Non-[facility] follow up appointment(s):n/a  7 Day follow up with PCP or Specialist: Dr. Mara Mcwilliams  Patient reports PCP aware of surgery and prefers to contact PCP if needed. Transportation: Arranged        Goals      Understands red flags post discharge. Patient agrees to contact MD with fever>101, increased drainage from wound site, or inability to bear weight. Next Outreach Scheduled: 1/23/2020  This note will not be viewable in [address]

## 2020-01-17 PROCEDURE — 3331090002 HH PPS REVENUE DEBIT

## 2020-01-17 PROCEDURE — 3331090001 HH PPS REVENUE CREDIT

## 2020-01-18 PROCEDURE — 3331090002 HH PPS REVENUE DEBIT

## 2020-01-18 PROCEDURE — 3331090001 HH PPS REVENUE CREDIT

## 2020-01-19 PROCEDURE — 3331090001 HH PPS REVENUE CREDIT

## 2020-01-19 PROCEDURE — 3331090002 HH PPS REVENUE DEBIT

## 2020-01-20 ENCOUNTER — HOME CARE VISIT (OUTPATIENT)
Dept: SCHEDULING | Facility: HOME HEALTH | Age: 80
End: 2020-01-20
Payer: MEDICARE

## 2020-01-20 VITALS
SYSTOLIC BLOOD PRESSURE: 160 MMHG | HEART RATE: 80 BPM | TEMPERATURE: 97.9 F | DIASTOLIC BLOOD PRESSURE: 72 MMHG | RESPIRATION RATE: 17 BRPM

## 2020-01-20 PROCEDURE — 3331090002 HH PPS REVENUE DEBIT

## 2020-01-20 PROCEDURE — G0157 HHC PT ASSISTANT EA 15: HCPCS

## 2020-01-20 PROCEDURE — 3331090001 HH PPS REVENUE CREDIT

## 2020-01-21 PROCEDURE — 3331090001 HH PPS REVENUE CREDIT

## 2020-01-21 PROCEDURE — 3331090002 HH PPS REVENUE DEBIT

## 2020-01-22 PROCEDURE — 3331090002 HH PPS REVENUE DEBIT

## 2020-01-22 PROCEDURE — 3331090001 HH PPS REVENUE CREDIT

## 2020-01-23 ENCOUNTER — PATIENT OUTREACH (OUTPATIENT)
Dept: CASE MANAGEMENT | Age: 80
End: 2020-01-23

## 2020-01-23 ENCOUNTER — HOME CARE VISIT (OUTPATIENT)
Dept: SCHEDULING | Facility: HOME HEALTH | Age: 80
End: 2020-01-23
Payer: MEDICARE

## 2020-01-23 VITALS
DIASTOLIC BLOOD PRESSURE: 70 MMHG | RESPIRATION RATE: 17 BRPM | HEART RATE: 70 BPM | TEMPERATURE: 98.1 F | SYSTOLIC BLOOD PRESSURE: 150 MMHG

## 2020-01-23 PROCEDURE — 3331090001 HH PPS REVENUE CREDIT

## 2020-01-23 PROCEDURE — G0157 HHC PT ASSISTANT EA 15: HCPCS

## 2020-01-23 PROCEDURE — 3331090002 HH PPS REVENUE DEBIT

## 2020-01-23 NOTE — PROGRESS NOTES
AMOS follow up call. HH PT contacting PCP due to patient's elevated BS. PCP increased NPG by 2 units BID, and every 3 days until average BS is below 150. Patient verbalized understanding. CTN suggested calling PCP for an appointment. Patient declined at this time. Emphasized the importance of PCP follow up. Patient declined at this time. Patient agrees to contact CTN with any issues prior to next outreach. HH PT scheduled for another visit 1/24/2020. This note will not be viewable in [address]

## 2020-01-24 ENCOUNTER — HOME CARE VISIT (OUTPATIENT)
Dept: SCHEDULING | Facility: HOME HEALTH | Age: 80
End: 2020-01-24
Payer: MEDICARE

## 2020-01-24 PROCEDURE — A4649 SURGICAL SUPPLIES: HCPCS

## 2020-01-24 PROCEDURE — 3331090002 HH PPS REVENUE DEBIT

## 2020-01-24 PROCEDURE — 3331090001 HH PPS REVENUE CREDIT

## 2020-01-24 PROCEDURE — G0157 HHC PT ASSISTANT EA 15: HCPCS

## 2020-01-25 PROCEDURE — 3331090002 HH PPS REVENUE DEBIT

## 2020-01-25 PROCEDURE — 3331090001 HH PPS REVENUE CREDIT

## 2020-01-26 PROCEDURE — 3331090001 HH PPS REVENUE CREDIT

## 2020-01-26 PROCEDURE — 3331090002 HH PPS REVENUE DEBIT

## 2020-01-27 ENCOUNTER — HOME CARE VISIT (OUTPATIENT)
Dept: SCHEDULING | Facility: HOME HEALTH | Age: 80
End: 2020-01-27
Payer: MEDICARE

## 2020-01-27 ENCOUNTER — PATIENT OUTREACH (OUTPATIENT)
Dept: CASE MANAGEMENT | Age: 80
End: 2020-01-27

## 2020-01-27 VITALS
SYSTOLIC BLOOD PRESSURE: 140 MMHG | DIASTOLIC BLOOD PRESSURE: 78 MMHG | TEMPERATURE: 97.9 F | RESPIRATION RATE: 18 BRPM | HEART RATE: 70 BPM

## 2020-01-27 PROCEDURE — 3331090002 HH PPS REVENUE DEBIT

## 2020-01-27 PROCEDURE — G0157 HHC PT ASSISTANT EA 15: HCPCS

## 2020-01-27 PROCEDURE — 3331090001 HH PPS REVENUE CREDIT

## 2020-01-27 NOTE — PROGRESS NOTES
AMOS follow up call. Patient reports he continue to monitor BS and use NPH as ordered. States BS running between 150-200 over the weekend. Discussed the importance of watching his carb intake and maintaining normal reading for the healing process. Discussed again with patient PCP follow up. He reports he is in contact with PCP and prefers to wait to visit until it is easier for him to be transported. Patient remains engaged with [facility] PT with next visit scheduled  1/30/2020. Patient agrees to contact CTN with any issues prior to next outreach. This note will not be viewable in [address]

## 2020-01-28 PROCEDURE — 3331090002 HH PPS REVENUE DEBIT

## 2020-01-28 PROCEDURE — 3331090001 HH PPS REVENUE CREDIT

## 2020-01-29 PROCEDURE — 3331090002 HH PPS REVENUE DEBIT

## 2020-01-29 PROCEDURE — 3331090001 HH PPS REVENUE CREDIT

## 2020-01-30 ENCOUNTER — HOME CARE VISIT (OUTPATIENT)
Dept: SCHEDULING | Facility: HOME HEALTH | Age: 80
End: 2020-01-30
Payer: MEDICARE

## 2020-01-30 VITALS — DIASTOLIC BLOOD PRESSURE: 88 MMHG | SYSTOLIC BLOOD PRESSURE: 140 MMHG | RESPIRATION RATE: 17 BRPM | HEART RATE: 90 BPM

## 2020-01-30 PROCEDURE — 3331090002 HH PPS REVENUE DEBIT

## 2020-01-30 PROCEDURE — G0157 HHC PT ASSISTANT EA 15: HCPCS

## 2020-01-30 PROCEDURE — 3331090001 HH PPS REVENUE CREDIT

## 2020-01-31 PROCEDURE — 3331090002 HH PPS REVENUE DEBIT

## 2020-01-31 PROCEDURE — 3331090001 HH PPS REVENUE CREDIT

## 2020-02-01 PROCEDURE — 3331090002 HH PPS REVENUE DEBIT

## 2020-02-01 PROCEDURE — 3331090001 HH PPS REVENUE CREDIT

## 2020-02-02 PROCEDURE — 3331090001 HH PPS REVENUE CREDIT

## 2020-02-02 PROCEDURE — 3331090002 HH PPS REVENUE DEBIT

## 2020-02-03 ENCOUNTER — PATIENT OUTREACH (OUTPATIENT)
Dept: CASE MANAGEMENT | Age: 80
End: 2020-02-03

## 2020-02-03 ENCOUNTER — HOME CARE VISIT (OUTPATIENT)
Dept: SCHEDULING | Facility: HOME HEALTH | Age: 80
End: 2020-02-03
Payer: MEDICARE

## 2020-02-03 PROCEDURE — G0157 HHC PT ASSISTANT EA 15: HCPCS

## 2020-02-03 PROCEDURE — 3331090002 HH PPS REVENUE DEBIT

## 2020-02-03 PROCEDURE — 3331090001 HH PPS REVENUE CREDIT

## 2020-02-03 NOTE — PROGRESS NOTES
AMOS follow up call. Patient answered phone and then hung up. Unable to leave message. Per CC patient had an increase with his Novolin by 14 units due to increased Accu-Check readings. Patient verbalized understanding per CC note by PCP office and is scheduled to follow up with PCP 2/27/2020. Per CC patient also remains engaged with [place] PT. CTN will continue to attempt to outreach to patient. This note will not be viewable in [address]

## 2020-02-04 VITALS
HEART RATE: 70 BPM | RESPIRATION RATE: 17 BRPM | TEMPERATURE: 98.1 F | SYSTOLIC BLOOD PRESSURE: 132 MMHG | DIASTOLIC BLOOD PRESSURE: 70 MMHG

## 2020-02-04 PROCEDURE — 3331090001 HH PPS REVENUE CREDIT

## 2020-02-04 PROCEDURE — 3331090002 HH PPS REVENUE DEBIT

## 2020-02-05 PROCEDURE — 3331090001 HH PPS REVENUE CREDIT

## 2020-02-05 PROCEDURE — 3331090002 HH PPS REVENUE DEBIT

## 2020-02-06 ENCOUNTER — HOME CARE VISIT (OUTPATIENT)
Dept: SCHEDULING | Facility: HOME HEALTH | Age: 80
End: 2020-02-06
Payer: MEDICARE

## 2020-02-06 VITALS
RESPIRATION RATE: 19 BRPM | SYSTOLIC BLOOD PRESSURE: 122 MMHG | DIASTOLIC BLOOD PRESSURE: 76 MMHG | HEART RATE: 83 BPM | TEMPERATURE: 97.5 F

## 2020-02-06 PROCEDURE — G0151 HHCP-SERV OF PT,EA 15 MIN: HCPCS

## 2020-02-06 PROCEDURE — 3331090001 HH PPS REVENUE CREDIT

## 2020-02-06 PROCEDURE — 3331090002 HH PPS REVENUE DEBIT

## 2020-02-07 PROCEDURE — 3331090001 HH PPS REVENUE CREDIT

## 2020-02-07 PROCEDURE — 3331090002 HH PPS REVENUE DEBIT

## 2020-02-08 PROCEDURE — 3331090002 HH PPS REVENUE DEBIT

## 2020-02-08 PROCEDURE — 3331090001 HH PPS REVENUE CREDIT

## 2020-02-09 PROCEDURE — 3331090002 HH PPS REVENUE DEBIT

## 2020-02-09 PROCEDURE — 3331090001 HH PPS REVENUE CREDIT

## 2020-02-10 ENCOUNTER — PATIENT OUTREACH (OUTPATIENT)
Dept: CASE MANAGEMENT | Age: 80
End: 2020-02-10

## 2020-02-10 NOTE — PROGRESS NOTES
AMOS follow up call. Patient reports doing fine. His BS's are improving now that he has completed steroids. Education provided on the importance of monitoring BS routinely due to recent insulin change. Patient verbalized understanding. Patient discharged from [facility] PT and will now attend outpatient PT twice weekly. Scheduled for follow  Up with Dr. Jagdish Quick 2/17/2020 per patient. Reports no issues at time of call. Agrees to contact CTN with any prior to next outreach. This note will not be viewable in [address]

## 2020-02-18 ENCOUNTER — PATIENT OUTREACH (OUTPATIENT)
Dept: CASE MANAGEMENT | Age: 80
End: 2020-02-18

## 2020-02-18 NOTE — PROGRESS NOTES
AMOS follow up call. Patient reports doing fine. Attended follow up appointment with Dr. Jean Paul Van 2/27/2020. Reports no signs and symptoms of infection. Patient remains engaged with outpatient PT. BS have returned to normal. Patient reports no issues at present time. No further needs identified. Will close episode and remove myself from care team. Provided contact information to patient for any future needs or concerns. This note will not be viewable in [address]

## 2020-04-14 NOTE — PERIOP NOTES
A Care Transition RN will be following this patient at discharge for 21 days due to a positive COVID-19 diagnosis.     PLEASE CONTINUE TAKING ALL PRESCRIPTION MEDICATIONS UP TO THE DAY OF SURGERY UNLESS OTHERWISE DIRECTED BELOW. DISCONTINUE all vitamins and supplements 21 days prior to surgery. DISCONTINUE Non-Steriodal Anti-Inflammatory (NSAIDS) such as Advil and Aleve 5 days prior to surgery. Home Medications to take  the day of surgery    Famotidine   Flecainide        Home Medications   to Hold   Diclofenac Gel hold for 5 days    Xarelto hold for 72 hours prior to surgery; replace with 81 mg ASpirin   Vitamins, Supplements, and Herbals. Non-Steriodal Anti-Inflammatory (NSAIDS) such as Advil and Aleve hold for 5 days. Comments   Bring insulins   Bring remaining Dynahex solution          Please do not bring home medications with you on the day of surgery unless otherwise directed by your nurse. If you are instructed to bring home medications, please give them to your nurse as they will be administered by the nursing staff. If you have any questions, please call [facility] [phone] or [facility] [phone].

## 2020-09-24 ENCOUNTER — HOSPITAL ENCOUNTER (OUTPATIENT)
Dept: CT IMAGING | Age: 80
Discharge: HOME | End: 2020-09-24
Attending: UROLOGY
Payer: MEDICARE

## 2020-09-24 DIAGNOSIS — R31.0 GROSS HEMATURIA: ICD-10-CM

## 2020-09-24 DIAGNOSIS — N40.0 BPH WITHOUT URINARY OBSTRUCTION: ICD-10-CM

## 2020-09-24 PROCEDURE — 74176 CT ABD & PELVIS W/O CONTRAST: CPT

## 2022-03-18 PROBLEM — Z96.651 STATUS POST TOTAL RIGHT KNEE REPLACEMENT: Status: ACTIVE | Noted: 2020-01-14

## 2022-03-18 PROBLEM — R42 DIZZINESS: Status: ACTIVE | Noted: 2017-11-27

## 2022-03-19 PROBLEM — M17.11 ARTHRITIS OF KNEE, RIGHT: Status: ACTIVE | Noted: 2020-01-14

## 2022-03-19 PROBLEM — E11.21 TYPE 2 DIABETES WITH NEPHROPATHY (HCC): Status: ACTIVE | Noted: 2019-07-05

## 2022-03-20 PROBLEM — D12.2 ADENOMATOUS POLYP OF ASCENDING COLON: Status: ACTIVE | Noted: 2018-06-12

## 2022-05-03 PROBLEM — E11.22 TYPE 2 DIABETES MELLITUS WITH CHRONIC KIDNEY DISEASE (HCC): Status: ACTIVE | Noted: 2022-05-03

## 2022-06-08 DIAGNOSIS — I48.91 ATRIAL FIBRILLATION (HCC): Primary | ICD-10-CM

## 2022-06-08 DIAGNOSIS — I10 HYPERTENSION: ICD-10-CM

## 2022-06-28 ENCOUNTER — TELEPHONE (OUTPATIENT)
Dept: INTERNAL MEDICINE CLINIC | Facility: CLINIC | Age: 82
End: 2022-06-28

## 2022-06-28 ENCOUNTER — OFFICE VISIT (OUTPATIENT)
Dept: INTERNAL MEDICINE CLINIC | Facility: CLINIC | Age: 82
End: 2022-06-28
Payer: MEDICARE

## 2022-06-28 VITALS
TEMPERATURE: 97.2 F | WEIGHT: 236.4 LBS | HEIGHT: 70 IN | RESPIRATION RATE: 16 BRPM | BODY MASS INDEX: 33.84 KG/M2 | HEART RATE: 52 BPM | DIASTOLIC BLOOD PRESSURE: 58 MMHG | SYSTOLIC BLOOD PRESSURE: 169 MMHG

## 2022-06-28 DIAGNOSIS — I10 HYPERTENSION, UNSPECIFIED TYPE: ICD-10-CM

## 2022-06-28 DIAGNOSIS — N18.30 TYPE 2 DIABETES MELLITUS WITH STAGE 3 CHRONIC KIDNEY DISEASE, WITH LONG-TERM CURRENT USE OF INSULIN, UNSPECIFIED WHETHER STAGE 3A OR 3B CKD (HCC): ICD-10-CM

## 2022-06-28 DIAGNOSIS — R31.0 GROSS HEMATURIA: Primary | ICD-10-CM

## 2022-06-28 DIAGNOSIS — Z79.4 TYPE 2 DIABETES MELLITUS WITH STAGE 3 CHRONIC KIDNEY DISEASE, WITH LONG-TERM CURRENT USE OF INSULIN, UNSPECIFIED WHETHER STAGE 3A OR 3B CKD (HCC): ICD-10-CM

## 2022-06-28 DIAGNOSIS — E11.22 TYPE 2 DIABETES MELLITUS WITH STAGE 3 CHRONIC KIDNEY DISEASE, WITH LONG-TERM CURRENT USE OF INSULIN, UNSPECIFIED WHETHER STAGE 3A OR 3B CKD (HCC): ICD-10-CM

## 2022-06-28 DIAGNOSIS — E53.8 B12 DEFICIENCY: ICD-10-CM

## 2022-06-28 LAB
ANION GAP SERPL CALC-SCNC: 4 MMOL/L (ref 7–16)
APTT PPP: 36.9 SEC (ref 24.1–35.1)
BASOPHILS # BLD: 0 K/UL (ref 0–0.2)
BASOPHILS NFR BLD: 1 % (ref 0–2)
BILIRUBIN, URINE, POC: NEGATIVE
BLOOD URINE, POC: NORMAL
BUN SERPL-MCNC: 24 MG/DL (ref 8–23)
CALCIUM SERPL-MCNC: 9.1 MG/DL (ref 8.3–10.4)
CHLORIDE SERPL-SCNC: 107 MMOL/L (ref 98–107)
CO2 SERPL-SCNC: 28 MMOL/L (ref 21–32)
CREAT SERPL-MCNC: 1.3 MG/DL (ref 0.8–1.5)
DIFFERENTIAL METHOD BLD: ABNORMAL
EOSINOPHIL # BLD: 0.1 K/UL (ref 0–0.8)
EOSINOPHIL NFR BLD: 2 % (ref 0.5–7.8)
ERYTHROCYTE [DISTWIDTH] IN BLOOD BY AUTOMATED COUNT: 13.1 % (ref 11.9–14.6)
GLUCOSE SERPL-MCNC: 186 MG/DL (ref 65–100)
GLUCOSE URINE, POC: NORMAL
HCT VFR BLD AUTO: 39.4 % (ref 41.1–50.3)
HGB BLD-MCNC: 12.4 G/DL (ref 13.6–17.2)
IMM GRANULOCYTES # BLD AUTO: 0 K/UL (ref 0–0.5)
IMM GRANULOCYTES NFR BLD AUTO: 0 % (ref 0–5)
INR PPP: 2.1
KETONES, URINE, POC: NEGATIVE
LEUKOCYTE ESTERASE, URINE, POC: NEGATIVE
LYMPHOCYTES # BLD: 1.6 K/UL (ref 0.5–4.6)
LYMPHOCYTES NFR BLD: 30 % (ref 13–44)
MCH RBC QN AUTO: 31.3 PG (ref 26.1–32.9)
MCHC RBC AUTO-ENTMCNC: 31.5 G/DL (ref 31.4–35)
MCV RBC AUTO: 99.5 FL (ref 79.6–97.8)
MONOCYTES # BLD: 0.6 K/UL (ref 0.1–1.3)
MONOCYTES NFR BLD: 11 % (ref 4–12)
NEUTS SEG # BLD: 3 K/UL (ref 1.7–8.2)
NEUTS SEG NFR BLD: 56 % (ref 43–78)
NITRITE, URINE, POC: NORMAL
NRBC # BLD: 0 K/UL (ref 0–0.2)
PH, URINE, POC: 5 (ref 4.6–8)
PLATELET # BLD AUTO: 138 K/UL (ref 150–450)
PMV BLD AUTO: 11.8 FL (ref 9.4–12.3)
POTASSIUM SERPL-SCNC: 4.5 MMOL/L (ref 3.5–5.1)
PROTEIN,URINE, POC: NEGATIVE
PROTHROMBIN TIME: 24.4 SEC (ref 12.6–14.5)
RBC # BLD AUTO: 3.96 M/UL (ref 4.23–5.6)
SODIUM SERPL-SCNC: 139 MMOL/L (ref 138–145)
SPECIFIC GRAVITY, URINE, POC: 1.03 (ref 1–1.03)
URINALYSIS CLARITY, POC: CLEAR
URINALYSIS COLOR, POC: YELLOW
UROBILINOGEN, POC: 0.2
WBC # BLD AUTO: 5.3 K/UL (ref 4.3–11.1)

## 2022-06-28 PROCEDURE — 81003 URINALYSIS AUTO W/O SCOPE: CPT | Performed by: NURSE PRACTITIONER

## 2022-06-28 PROCEDURE — G8417 CALC BMI ABV UP PARAM F/U: HCPCS | Performed by: NURSE PRACTITIONER

## 2022-06-28 PROCEDURE — 1036F TOBACCO NON-USER: CPT | Performed by: NURSE PRACTITIONER

## 2022-06-28 PROCEDURE — 99214 OFFICE O/P EST MOD 30 MIN: CPT | Performed by: NURSE PRACTITIONER

## 2022-06-28 PROCEDURE — G8427 DOCREV CUR MEDS BY ELIG CLIN: HCPCS | Performed by: NURSE PRACTITIONER

## 2022-06-28 PROCEDURE — 3052F HG A1C>EQUAL 8.0%<EQUAL 9.0%: CPT | Performed by: NURSE PRACTITIONER

## 2022-06-28 PROCEDURE — 1123F ACP DISCUSS/DSCN MKR DOCD: CPT | Performed by: NURSE PRACTITIONER

## 2022-06-28 RX ORDER — CYANOCOBALAMIN 1000 UG/ML
1000 INJECTION INTRAMUSCULAR; SUBCUTANEOUS
Qty: 1 ML | Refills: 0 | Status: SHIPPED | OUTPATIENT
Start: 2022-06-28 | End: 2022-06-28 | Stop reason: SDUPTHER

## 2022-06-28 RX ORDER — ENALAPRIL MALEATE 20 MG/1
40 TABLET ORAL DAILY
Qty: 90 TABLET | Refills: 1 | Status: SHIPPED | OUTPATIENT
Start: 2022-06-28 | End: 2022-09-26

## 2022-06-28 RX ORDER — BLOOD-GLUCOSE METER
1 KIT MISCELLANEOUS DAILY PRN
Refills: 0 | Status: CANCELLED | OUTPATIENT
Start: 2022-06-28

## 2022-06-28 RX ORDER — CYANOCOBALAMIN 1000 UG/ML
1000 INJECTION INTRAMUSCULAR; SUBCUTANEOUS
Qty: 3 ML | Refills: 3 | Status: SHIPPED | OUTPATIENT
Start: 2022-06-28 | End: 2022-06-28 | Stop reason: SDUPTHER

## 2022-06-28 RX ORDER — SYRINGE-NEEDLE,INSULIN,0.5 ML 28GX1/2"
SYRINGE, EMPTY DISPOSABLE MISCELLANEOUS
Qty: 3 EACH | Refills: 3 | Status: SHIPPED | OUTPATIENT
Start: 2022-06-28

## 2022-06-28 SDOH — ECONOMIC STABILITY: FOOD INSECURITY: WITHIN THE PAST 12 MONTHS, YOU WORRIED THAT YOUR FOOD WOULD RUN OUT BEFORE YOU GOT MONEY TO BUY MORE.: NEVER TRUE

## 2022-06-28 SDOH — ECONOMIC STABILITY: FOOD INSECURITY: WITHIN THE PAST 12 MONTHS, THE FOOD YOU BOUGHT JUST DIDN'T LAST AND YOU DIDN'T HAVE MONEY TO GET MORE.: NEVER TRUE

## 2022-06-28 ASSESSMENT — PATIENT HEALTH QUESTIONNAIRE - PHQ9
1. LITTLE INTEREST OR PLEASURE IN DOING THINGS: 0
2. FEELING DOWN, DEPRESSED OR HOPELESS: 0
SUM OF ALL RESPONSES TO PHQ QUESTIONS 1-9: 0
SUM OF ALL RESPONSES TO PHQ9 QUESTIONS 1 & 2: 0

## 2022-06-28 ASSESSMENT — SOCIAL DETERMINANTS OF HEALTH (SDOH): HOW HARD IS IT FOR YOU TO PAY FOR THE VERY BASICS LIKE FOOD, HOUSING, MEDICAL CARE, AND HEATING?: NOT HARD AT ALL

## 2022-06-28 ASSESSMENT — ENCOUNTER SYMPTOMS
BLOOD IN STOOL: 0
ABDOMINAL PAIN: 0
COUGH: 0
SHORTNESS OF BREATH: 0

## 2022-06-28 NOTE — PROGRESS NOTES
6/28/2022 12:44 PM  Location:[facility] [address] [facility]  [place]  Patient #:  [id number]  YOB: 1940      History of Present Illness     Chief Complaint   Patient presents with    Penile Discharge     for the last 3-4 days he have noticed bright red spots of blood in his underwear; he have seen it sometimes when he urinate       Mr. Allan Matt is a [de-identified] y.o. male  who presents for blood in urine and in underwear. He noticed it started a few days ago when he had recently helped his family with some strenuous chores. He has hx of this in 2020 and had workup done by urology that was unrevealing per pt, did not have to stop anticoagulant, xarelto (hx paf). He reports that he notes blood in underwear in the mornings when getting up and he puts a paper towel in underwear at night to catch it but it is about 3-5 drops of blood noted on the paper towel. He tried to call urologist and did not get through on phone line. No abd pain, no flank pain, no dysuria. X 3-4 days. No fever/chills. Bps at home 160/80 range. Reports he has gained weight recently and is more inactive. No Known Allergies     Current Outpatient Medications   Medication Sig Dispense Refill    enalapril (VASOTEC) 20 MG tablet Take 2 tablets by mouth daily Take 2 tablet by mouth once daily- increased dose from 20mg to 40mg.  90 tablet 1    cyanocobalamin 1000 MCG/ML injection Inject 1 mL into the muscle every 30 days 3 mL 3    TUBERCULIN SYR 1CC/25GX5/8\" (BD SYRINGE SLIP TIP) 25G X 5/8\" 1 ML MISC Use to inject b12 once monthly 3 each 3    insulin regular (NOVOLIN R RELION) 100 UNIT/ML injection 14 units with meals 10 mL 1    dilTIAZem (TIAZAC) 120 MG extended release capsule Take 120 mg by mouth daily      finasteride (PROSCAR) 5 MG tablet Take 5 mg by mouth daily      flecainide (TAMBOCOR) 100 MG tablet Take 1 tablet by mouth twice daily      insulin NPH (HUMULIN N;NOVOLIN N) 100 UNIT/ML injection vial INJECT 10 UNITS SUBCUTANEOUSLY TWICE DAILY WITH MEALS      meclizine (ANTIVERT) 25 MG tablet Take 25 mg by mouth 3 times daily as needed      rivaroxaban (XARELTO) 20 MG TABS tablet Take 20 mg by mouth Daily with supper      tamsulosin (FLOMAX) 0.4 MG capsule TAKE 1 CAPSULE BY MOUTH ONCE DAILY       No current facility-administered medications for this visit. Past Medical History:   Diagnosis Date    Anemia     Arthritis     Atrial fibrillation ([facility]     Predominantly atrial flutter; flecainide 2014; atrial flutter ablation 2/11/16; unclear extent of a fib    Calculus of kidney     Conjunctivitis unspecified     Diabetes mellitus type II, uncontrolled ([facility]     BS run between(100-170). Low BS s/sx:weak, shakes, =bs below 60.    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    Type 2 diabetes mellitus ([facility] 2004    checks SQBS every 1-2 weeks; HGB A1C 12/23/19:  7.8    Type I (juvenile type) diabetes mellitus without mention of complication, not stated as uncontrolled     PATIENT DENIES    Unspecified hyperplasia of prostate without urinary obstruction and other lower urinary tract symptoms (LUTS)         Social History     Socioeconomic History    Marital status:      Spouse name: Not on file    Number of children: Not on file    Years of education: Not on file    Highest education level: Not on file   Occupational History    Not on file   Tobacco Use    Smoking status: Never Smoker    Smokeless tobacco: Never Used   Substance and Sexual Activity    Alcohol use: No    Drug use: No    Sexual activity: Not on file   Other Topics Concern    Not on file Social History Narrative    Not on file     Social Determinants of Health     Financial Resource Strain: Low Risk     Difficulty of Paying Living Expenses: Not hard at all   Food Insecurity: No Food Insecurity    Worried About Running Out of Food in the Last Year: Never true    [address] in the Last Year: Never true   Transportation Needs:     Lack of Transportation (Medical): Not on file    Lack of Transportation (Non-Medical): Not on file   Physical Activity:     Days of Exercise per Week: Not on file    Minutes of Exercise per Session: Not on file   Stress:     Feeling of Stress : Not on file   Social Connections:     Frequency of Communication with Friends and Family: Not on file    Frequency of Social Gatherings with Friends and Family: Not on file    Attends Gnosticist Services: Not on file    Active Member of [address] [place] [address] or Organizations: Not on file    Attends Club or Organization Meetings: Not on file    Marital Status: Not on file   Intimate Partner Violence:     Fear of Current or Ex-Partner: Not on file    Emotionally Abused: Not on file    Physically Abused: Not on file    Sexually Abused: Not on file   Housing Stability:     Unable to Pay for Housing in the Last Year: Not on file    Number of [place] in the Last Year: Not on file    Unstable Housing in the Last Year: Not on file            Review of Systems    Review of Systems   Constitutional: Negative for chills, fatigue, fever and unexpected weight change. Respiratory: Negative for cough and shortness of breath. Cardiovascular: Negative for chest pain. Gastrointestinal: Negative for abdominal pain and blood in stool. Genitourinary: Positive for hematuria and penile discharge. Negative for decreased urine volume, difficulty urinating, dysuria, flank pain, frequency, penile pain, penile swelling and scrotal swelling. Skin: Negative for wound. All other systems reviewed and are negative.       BP (!) 169/58 (Site: Left Upper Arm, Position: Sitting, Cuff Size: Large Adult)   Pulse 52   Temp 97.2 °F (36.2 °C) (Temporal)   Resp 16   Ht 5' 10\" (1.778 m)   Wt 236 lb 6.4 oz (107.2 kg)   BMI 33.92 kg/m²       Physical Exam    Physical Exam  Constitutional:       General: He is not in acute distress. Appearance: Normal appearance. He is obese. He is not ill-appearing. Cardiovascular:      Rate and Rhythm: Normal rate and regular rhythm. Heart sounds: Normal heart sounds. Pulmonary:      Effort: Pulmonary effort is normal.      Breath sounds: Normal breath sounds. Neurological:      Mental Status: He is alert and oriented to person, place, and time. Assessment & Plan    Arianna Brady was seen today for penile discharge. Diagnoses and all orders for this visit:    Gross hematuria  -     AMB POC URINALYSIS DIP STICK AUTO W/O MICRO  -     Culture, Urine  -     CBC with Auto Differential; Future  -     Basic Metabolic Panel; Future  -     Protime-INR; Future  -     APTT; Future  -     CT ABDOMEN PELVIS WO CONTRAST Additional Contrast? Radiologist Recommendation; Future  -     APTT  -     Protime-INR  -     Basic Metabolic Panel  -     CBC with Auto Differential    Type 2 diabetes mellitus with stage 3 chronic kidney disease, with long-term current use of insulin, unspecified whether stage 3a or 3b CKD (HCC)  -     enalapril (VASOTEC) 20 MG tablet; Take 2 tablets by mouth daily Take 2 tablet by mouth once daily- increased dose from 20mg to 40mg. -     TUBERCULIN SYR 1CC/25GX5/8\" (BD SYRINGE SLIP TIP) 25G X 5/8\" 1 ML MISC; Use to inject b12 once monthly    Hypertension, unspecified type  -     enalapril (VASOTEC) 20 MG tablet; Take 2 tablets by mouth daily Take 2 tablet by mouth once daily- increased dose from 20mg to 40mg. B12 deficiency  -     cyanocobalamin 1000 MCG/ML injection; Inject 1 mL into the muscle every 30 days    Other orders  -     Discontinue: cyanocobalamin 1000 MCG/ML injection;  Inject 1 mL into the skin every 30 days Once weekly x 4 weeks then monthly       Will check labs and follow, cx urine, gross blood in ua 2 plus. Continue anticoag for now, pt is not frankly bleeding and it is seemingly only a few drops at night. Will CT abd/pelvis- last one done in 2020. Will have staff try to get asap appt with urology for further eval. Increase enalapril to 40mg and follow up 2 weeks. Cr 1.2 at last check, may need to recheck based on pending result. He was advised to seek er care for sam penile bleeding or sob, cp, feeling faint. Orders Placed This Encounter   Procedures    Culture, Urine     Order Specific Question:   Specify (ex-cath, midstream, cysto, etc)? Answer:   midstream    CT ABDOMEN PELVIS WO CONTRAST Additional Contrast? Radiologist Recommendation     Standing Status:   Future     Standing Expiration Date:   6/28/2023     Scheduling Instructions:      [place]     Order Specific Question:   Additional Contrast?     Answer:   Radiologist Recommendation     Order Specific Question:   Reason for exam:     Answer:   hematuria    CBC with Auto Differential     Standing Status:   Future     Number of Occurrences:   1     Standing Expiration Date:   6/28/2023    Basic Metabolic Panel     Standing Status:   Future     Number of Occurrences:   1     Standing Expiration Date:   6/28/2023    Protime-INR     Standing Status:   Future     Number of Occurrences:   1     Standing Expiration Date:   6/28/2023     Order Specific Question:   Daily Coumadin Dose? Answer:   Pili Beltran APTT     Standing Status:   Future     Number of Occurrences:   1     Standing Expiration Date:   6/28/2023     Order Specific Question:   Daily Heparin Dose? Answer:   xarelto    AMB POC URINALYSIS DIP STICK AUTO W/O MICRO           No follow-up provider specified.         ALVA Bonilla NP

## 2022-06-28 NOTE — PATIENT INSTRUCTIONS
Patient Education        Blood in the Urine: Care Instructions  Your Care Instructions     Blood in the urine, or hematuria, may make the urine look red, brown, or pink. There may be blood every time you urinate or just from time to time. You cannotalways see blood in the urine, but it will show up in a urine test.  Blood in the urine may be serious. It should always be checked by a doctor. Your doctor may recommend more tests, including an X-ray, a CT scan, or acystoscopy (which lets a doctor look inside the urethra and bladder). Blood in the urine can be a sign of another problem. Common causes are bladder infections and kidney stones. An injury to your groin or your genital area can also cause bleeding in the urinary tract. Very hard exercise--such as running a marathon--can cause blood in the urine. Blood in the urine can also be a sign of kidney disease or cancer in the bladder or kidney. Many cases of blood in the urine are caused by a harmless condition that runs in families. This is calledbenign familial hematuria. It does not need any treatment. Sometimes your urine may look red or brown even though it does not contain blood. For example, not getting enough fluids (dehydration), taking certain medicines, or having a liver problem can change the color of your urine. Eating foods such as beets, rhubarb, or blackberries or foods with red food coloringcan make your urine look red or pink. Follow-up care is a key part of your treatment and safety. Be sure to make and go to all appointments, and call your doctor if you are having problems. It's also a good idea to know your test results and keep alist of the medicines you take. When should you call for help? Call your doctor now or seek immediate medical care if:     You have symptoms of a urinary infection. For example:  ? You have pus in your urine. ? You have pain in your back just below your rib cage. This is called flank pain. ?  You have a fever, chills, or body aches. ? It hurts to urinate. ? You have groin or belly pain.      You have more blood in your urine. Watch closely for changes in your health, and be sure to contact your doctor if:     You have new urination problems.      You do not get better as expected. Where can you learn more? Go to [email or web address].[email or web address] org and sign in to your [employer] account. Enter Z853 in the [employer] box to learn more about \"Blood in the Urine: Care Instructions. \"     If you do not have an account, please click on the \"Sign Up Now\" link. Current as of: October 18, 2021               Content Version: 13.3  © 1019-1266 [employer]wise, Incorporated. Care instructions adapted under license by [facility] ([facility]). If you have questions about a medical condition or this instruction, always ask your healthcare professional. [address] any warranty or liability for your use of this information.

## 2022-06-28 NOTE — TELEPHONE ENCOUNTER
Please try to call dr bueno's office- [facility]- pt has gross hematuria, needs asap appt but cannot get through on phone line.

## 2022-06-28 NOTE — TELEPHONE ENCOUNTER
Called [facility], pt's prescription is good til Feb. Pt just needs to call and place an order for a refill. Pt spoke with representative in the office to schedule shipment. anc

## 2022-06-29 ENCOUNTER — TELEPHONE (OUTPATIENT)
Dept: INTERNAL MEDICINE CLINIC | Facility: CLINIC | Age: 82
End: 2022-06-29

## 2022-06-29 NOTE — TELEPHONE ENCOUNTER
----- Message from ALVA Morel NP sent at 6/28/2022  5:10 PM EDT -----  Spoke with Dr Sheila Huerta regarding the lab work, PTT mild elevated and PT elevated, stop xarelto x 3 days and take baby asa daily while off of xarelto, then can restart xarelto in 3 days, still tryto get appt for him with urology.

## 2022-07-01 ENCOUNTER — OFFICE VISIT (OUTPATIENT)
Dept: UROLOGY | Age: 82
End: 2022-07-01
Payer: MEDICARE

## 2022-07-01 ENCOUNTER — TELEPHONE (OUTPATIENT)
Dept: INTERNAL MEDICINE CLINIC | Facility: CLINIC | Age: 82
End: 2022-07-01

## 2022-07-01 DIAGNOSIS — R31.0 GROSS HEMATURIA: ICD-10-CM

## 2022-07-01 DIAGNOSIS — N40.1 BENIGN PROSTATIC HYPERPLASIA WITH LOWER URINARY TRACT SYMPTOMS, SYMPTOM DETAILS UNSPECIFIED: Primary | ICD-10-CM

## 2022-07-01 LAB
BACTERIA SPEC CULT: NORMAL
BILIRUBIN, URINE, POC: NEGATIVE
BLOOD URINE, POC: NORMAL
GLUCOSE URINE, POC: >=1000
KETONES, URINE, POC: NEGATIVE
LEUKOCYTE ESTERASE, URINE, POC: NEGATIVE
NITRITE, URINE, POC: NEGATIVE
PH, URINE, POC: 5.5 (ref 4.6–8)
PROTEIN,URINE, POC: NEGATIVE
PVR, POC: 75 CC
SERVICE CMNT-IMP: NORMAL
SPECIFIC GRAVITY, URINE, POC: 1.01 (ref 1–1.03)
URINALYSIS CLARITY, POC: NORMAL
URINALYSIS COLOR, POC: NORMAL
UROBILINOGEN, POC: NORMAL

## 2022-07-01 PROCEDURE — 51798 US URINE CAPACITY MEASURE: CPT | Performed by: UROLOGY

## 2022-07-01 PROCEDURE — 1036F TOBACCO NON-USER: CPT | Performed by: UROLOGY

## 2022-07-01 PROCEDURE — G8427 DOCREV CUR MEDS BY ELIG CLIN: HCPCS | Performed by: UROLOGY

## 2022-07-01 PROCEDURE — 99214 OFFICE O/P EST MOD 30 MIN: CPT | Performed by: UROLOGY

## 2022-07-01 PROCEDURE — 81003 URINALYSIS AUTO W/O SCOPE: CPT | Performed by: UROLOGY

## 2022-07-01 PROCEDURE — 1123F ACP DISCUSS/DSCN MKR DOCD: CPT | Performed by: UROLOGY

## 2022-07-01 PROCEDURE — G8417 CALC BMI ABV UP PARAM F/U: HCPCS | Performed by: UROLOGY

## 2022-07-01 ASSESSMENT — ENCOUNTER SYMPTOMS
BLOOD IN STOOL: 0
COUGH: 0
EYE DISCHARGE: 0
VOMITING: 0
EYE PAIN: 0
SKIN LESIONS: 0
SHORTNESS OF BREATH: 0
BACK PAIN: 0
CONSTIPATION: 0
NAUSEA: 0
INDIGESTION: 0
HEARTBURN: 0
WHEEZING: 0
ABDOMINAL PAIN: 0
DIARRHEA: 0

## 2022-07-01 NOTE — TELEPHONE ENCOUNTER
Labs look stable and urine cx is negative for infection but for his platelet count which is low, we can recheck this at next appt. Did he get in touch with urology or did we get him an appt?

## 2022-07-01 NOTE — PROGRESS NOTES
[facility]  [address]    Geovanni Staples [address], [address]  : 1940    CC: Shu Peace Hematuria     HPI     Jenni Gutierres is a [de-identified] y.o.  male with a PMH of DM, BPH/LUTS and intermittent gross hematuria who returns for follow up.  Previously followed with Dr. Gregory Ackerman     Seen  and was doing well on flomax/finasteride. [place] had 2 hematuria work ups in  and  that showed prostate as source.     Today, he reports recurrence of his gross hematuria. No known cause. States it happens mainly at night and urine is red / no clots. Remains on his medications. He is on Xarelto. No bothersome urinary symptoms. His PCP ordered a CT A/P without contrast but he has not had this done yet.       Cysto in  with Dr. Lulu Pruitt showed BPH amenable to TURP but not urolift as the source.  Has nocturia X 1.  He denies retention, UTIs, bothersome urgency, frequency, other urinary symptoms/ concerns.  .      PVR: 70 cc at last visit. Past Medical History:   Diagnosis Date    Anemia     Arthritis     Atrial fibrillation ([address]     Predominantly atrial flutter; flecainide ; atrial flutter ablation 16; unclear extent of a fib    Calculus of kidney     Conjunctivitis unspecified     Diabetes mellitus type II, uncontrolled ([address]     BS run between(100-170). Low BS s/sx:weak, shakes, =bs below 60.    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    Type 2 diabetes mellitus ([address]     checks SQBS every 1-2 weeks; HGB A1C 19:  7.8    Type I (juvenile type) diabetes mellitus without mention of complication, not stated as uncontrolled     PATIENT DENIES    Unspecified hyperplasia of prostate without urinary obstruction and other lower urinary tract symptoms (LUTS)      Past Surgical History:   Procedure Laterality Date    ABLATION OF DYSRHYTHMIC FOCUS  2014   [address]    bulging disc repair in lumbar region    CATARACT REMOVAL Bilateral     COLONOSCOPY      IR ESOPHAGOSCOPY RIGID/FLUORO      TOTAL HIP ARTHROPLASTY Left 3/1/12     Current Outpatient Medications   Medication Sig Dispense Refill    enalapril (VASOTEC) 20 MG tablet Take 2 tablets by mouth daily Take 2 tablet by mouth once daily- increased dose from 20mg to 40mg. 90 tablet 1    TUBERCULIN SYR 1CC/25GX5/8\" (BD SYRINGE SLIP TIP) 25G X 5/8\" 1 ML MISC Use to inject b12 once monthly 3 each 3    insulin regular (NOVOLIN R RELION) 100 UNIT/ML injection 14 units with meals 10 mL 1    dilTIAZem (TIAZAC) 120 MG extended release capsule Take 120 mg by mouth daily      finasteride (PROSCAR) 5 MG tablet Take 5 mg by mouth daily      flecainide (TAMBOCOR) 100 MG tablet Take 1 tablet by mouth twice daily      insulin NPH (HUMULIN N;NOVOLIN N) 100 UNIT/ML injection vial INJECT 10 UNITS SUBCUTANEOUSLY TWICE DAILY WITH MEALS      meclizine (ANTIVERT) 25 MG tablet Take 25 mg by mouth 3 times daily as needed      rivaroxaban (XARELTO) 20 MG TABS tablet Take 20 mg by mouth Daily with supper      tamsulosin (FLOMAX) 0.4 MG capsule TAKE 1 CAPSULE BY MOUTH ONCE DAILY       No current facility-administered medications for this visit.      No Known Allergies  Social History     Socioeconomic History    Marital status:      Spouse name: Not on file    Number of children: Not on file    Years of education: Not on file    Highest education level: Not on file   Occupational History    Not on file   Tobacco Use    Smoking status: Never Smoker    Smokeless tobacco: Never Used   Substance and Sexual Activity    Alcohol use: No    Drug use: No    Sexual activity: Not on file   Other Topics Concern    Not on file   Social History Narrative    Not on file     Social Determinants of Health     Financial Resource Strain: Low Risk     Difficulty of Paying Living Expenses: Not hard at all   Food Insecurity: No Food Insecurity    Worried About Running Out of Food in the Last Year: Never true    [address] in the Last Year: Never true   Transportation Needs:     Lack of Transportation (Medical): Not on file    Lack of Transportation (Non-Medical): Not on file   Physical Activity:     Days of Exercise per Week: Not on file    Minutes of Exercise per Session: Not on file   Stress:     Feeling of Stress : Not on file   Social Connections:     Frequency of Communication with Friends and Family: Not on file    Frequency of Social Gatherings with Friends and Family: Not on file    Attends Buddhist Services: Not on file    Active Member of [address] [place] [address] [employer] or Organizations: Not on file    Attends Club or Organization Meetings: Not on file    Marital Status: Not on file   Intimate Partner Violence:     Fear of Current or Ex-Partner: Not on file    Emotionally Abused: Not on file    Physically Abused: Not on file    Sexually Abused: Not on file   Housing Stability:     Unable to Pay for Housing in the Last Year: Not on file    Number of [place] in the Last Year: Not on file    Unstable Housing in the Last Year: Not on file     Family History   Problem Relation Age of Onset    Heart Disease Brother     Pseudochol.  Deficiency Neg Hx     Delayed Awakening Neg Hx     Post-op Nausea/Vomiting Neg Hx     Emergence Delirium Neg Hx     Post-op Cognitive Dysfunction Neg Hx     Other Neg Hx     Cancer Neg Hx     Alzheimer's Disease Mother     Malig Hypertherm Neg Hx     Alzheimer's Disease Brother     Heart Disease Father     Diabetes Sister     Heart Failure Father         congestive       Review of Systems  Constitutional:   Negative for fever, chills, appetite change, malaise/fatigue, headaches and weight loss. Skin:  Negative for skin lesions, rash and itching. Eyes:  Negative for visual disturbance, eye pain and eye discharge. ENT:  Negative for difficulty articulating words, pain swallowing, high frequency hearing loss and dry mouth. Respiratory:  Negative for cough, blood in sputum, shortness of breath and wheezing. Cardiovascular:  Negative for chest pain, hypertension, irregular heartbeat, leg pain, leg swelling, regular rate and rhythm and varicose veins. GI:  Negative for nausea, vomiting, abdominal pain, blood in stool, constipation, diarrhea, indigestion and heartburn. Genitourinary: Positive for hematuria. Negative for urinary burning, flank pain, recurrent UTIs, history of urolithiasis, nocturia, slower stream, straining, urgency, leakage w/ urge, frequent urination, incomplete emptying, erectile dysfunction, testicular pain, sexually transmitted disease, discharge and urethral stricture. Musculoskeletal:  Negative for back pain, bone pain, arthralgias, tenderness, muscle weakness and neck pain. Neurological:  Negative for dizziness, focal weakness, numbness, seizures and tremors. Psychological:  Negative for depression and psychiatric problem. Endocrine:  Negative for cold intolerance, thirst, excessive urination, fatigue and heat intolerance. Hem/Lymphatic:  Negative for easy bleeding, easy bruising and frequent infections.       Urinalysis  UA - Dipstick  Results for orders placed or performed in visit on 07/01/22   AMB POC URINALYSIS DIP STICK AUTO W/O MICRO   Result Value Ref Range    Color (UA POC)      Clarity (UA POC)      Glucose, Urine, POC >=1000 Negative    Bilirubin, Urine, POC Negative Negative    KETONES, Urine, POC Negative Negative    Specific Gravity, Urine, POC 1.015 1.001 - 1.035    Blood (UA POC) Small Negative    pH, Urine, POC 5.5 4.6 - 8.0    Protein, Urine, POC Negative Negative    Urobilinogen, POC 0.2 mg/dL     Nitrite, Urine, POC Negative Negative    Leukocyte Esterase, Urine, POC Negative Negative   Results for orders placed or performed in visit on 06/28/22   AMB POC URINALYSIS DIP STICK AUTO W/O MICRO   Result Value Ref Range    Color, Urine, POC yellow     Clarity, Urine, POC clear     Glucose, Urine, POC 4+ Negative    Bilirubin, Urine, POC Negative Negative    KETONES, Urine, POC Negative Negative    Specific Gravity, Urine, POC 1.030 1.001 - 1.035    Blood, Urine, POC ++ Negative    pH, Urine, POC 5.0 4.6 - 8.0    Protein, Urine, POC Negative Negative    Urobilinogen, POC 0.2     Nitrate, Urine, POC neg Negative    Leukocyte Esterase, Urine, POC Negative Negative       There were no vitals taken for this visit. GENERAL: No acute distress, Awake, Alert, Oriented X 3, Gait normal  CARDIAC: regular rate and rhythm  CHEST AND LUNG: Easy work of breathing, clear to auscultation bilaterally, no cyanosis  ABDOMEN: soft, non tender, non-distended, positive bowel sounds, no organomegaly, no palpable masses, no guarding, no rebound tenderness  SKIN: No rash, no erythema, no lacerations or abrasions, no ecchymosis  NEUROLOGIC: cranial nerves 2-12 grossly intact         Assessment and Plan    ICD-10-CM    1. Benign prostatic hyperplasia with lower urinary tract symptoms, symptom details unspecified  N40.1 AMB POC URINALYSIS DIP STICK AUTO W/O MICRO     AMB POC PVR, MIKE,POST-VOID RES,US,NON-IMAGING   2. Gross hematuria  R31.0 AMB POC URINALYSIS DIP STICK AUTO W/O MICRO     Gross Hematuria:   Discussed that prostate is likely cause given his prior 2 works ups. Would recommend that the hold xarelto X 7 days if that is okay with his PCP and continue his prostate meds, push fluids to see if hematuria resolves on its own. If it fails to resolve, then he will let me know and would consider surgical intervention with TURP    CT A/P without contrast ordered by PCP pending. BPH/LUTS:   Continue flomax/finasteride. No bothersome LUTS at this time. I have spent 30 minutes today reviewing previous notes, test results and face to face with the patient as well as documenting. Gavin Dorman M.D.     [facility]  [address]  Phone: [phone]  Fax: [phone]

## 2022-07-19 ENCOUNTER — TELEPHONE (OUTPATIENT)
Dept: INTERNAL MEDICINE CLINIC | Facility: CLINIC | Age: 82
End: 2022-07-19

## 2022-07-19 NOTE — TELEPHONE ENCOUNTER
There were not obstructing kidney sones noted on his CT scan. He missed his appt the other day. Has the bleeding stopped? Did he see urology?

## 2022-07-19 NOTE — TELEPHONE ENCOUNTER
This has been fully explained to the patient, who indicates understanding. He has been seen by the urologist. And the bleeding has stopped.

## 2022-07-19 NOTE — TELEPHONE ENCOUNTER
Ethan Cunningham 34 minutes ago (3:58 PM)       Pt called and would like for someone to go over his radiology results.

## 2022-08-02 NOTE — TELEPHONE ENCOUNTER
Medication Refill Request      Name of Medication : Novolin R      Strength of Medication: 14 Units per shot      Directions: 3x daily      30 day or 90 day supply: 30      Which Pharmacy: [facility] in [place]

## 2022-08-24 ENCOUNTER — OFFICE VISIT (OUTPATIENT)
Dept: INTERNAL MEDICINE CLINIC | Facility: CLINIC | Age: 82
End: 2022-08-24
Payer: MEDICARE

## 2022-08-24 VITALS
TEMPERATURE: 97.2 F | SYSTOLIC BLOOD PRESSURE: 153 MMHG | WEIGHT: 239.8 LBS | OXYGEN SATURATION: 97 % | BODY MASS INDEX: 34.33 KG/M2 | HEIGHT: 70 IN | HEART RATE: 53 BPM | DIASTOLIC BLOOD PRESSURE: 65 MMHG | RESPIRATION RATE: 18 BRPM

## 2022-08-24 DIAGNOSIS — I48.21 PERMANENT ATRIAL FIBRILLATION (HCC): ICD-10-CM

## 2022-08-24 DIAGNOSIS — E11.21 TYPE 2 DIABETES WITH NEPHROPATHY (HCC): ICD-10-CM

## 2022-08-24 DIAGNOSIS — N18.31 STAGE 3A CHRONIC KIDNEY DISEASE (HCC): ICD-10-CM

## 2022-08-24 DIAGNOSIS — M19.90 ARTHRITIS: ICD-10-CM

## 2022-08-24 DIAGNOSIS — I10 PRIMARY HYPERTENSION: ICD-10-CM

## 2022-08-24 DIAGNOSIS — N42.9 BENIGN PROSTATIC DISEASE: ICD-10-CM

## 2022-08-24 DIAGNOSIS — D12.2 ADENOMATOUS POLYP OF ASCENDING COLON: ICD-10-CM

## 2022-08-24 DIAGNOSIS — R53.82 CHRONIC FATIGUE: ICD-10-CM

## 2022-08-24 DIAGNOSIS — E11.9 TYPE 2 DIABETES MELLITUS WITHOUT COMPLICATION, WITH LONG-TERM CURRENT USE OF INSULIN (HCC): Primary | ICD-10-CM

## 2022-08-24 DIAGNOSIS — Z79.4 TYPE 2 DIABETES MELLITUS WITHOUT COMPLICATION, WITH LONG-TERM CURRENT USE OF INSULIN (HCC): Primary | ICD-10-CM

## 2022-08-24 PROBLEM — R42 DIZZINESS: Status: RESOLVED | Noted: 2017-11-27 | Resolved: 2022-08-24

## 2022-08-24 PROBLEM — Z96.651 STATUS POST TOTAL RIGHT KNEE REPLACEMENT: Status: RESOLVED | Noted: 2020-01-14 | Resolved: 2022-08-24

## 2022-08-24 PROBLEM — M17.11 ARTHRITIS OF KNEE, RIGHT: Status: RESOLVED | Noted: 2020-01-14 | Resolved: 2022-08-24

## 2022-08-24 PROBLEM — E11.22 TYPE 2 DIABETES MELLITUS WITH CHRONIC KIDNEY DISEASE (HCC): Status: RESOLVED | Noted: 2022-05-03 | Resolved: 2022-08-24

## 2022-08-24 LAB
ANION GAP SERPL CALC-SCNC: 2 MMOL/L (ref 7–16)
BUN SERPL-MCNC: 23 MG/DL (ref 8–23)
CALCIUM SERPL-MCNC: 8.6 MG/DL (ref 8.3–10.4)
CHLORIDE SERPL-SCNC: 109 MMOL/L (ref 98–107)
CO2 SERPL-SCNC: 28 MMOL/L (ref 21–32)
CREAT SERPL-MCNC: 1.2 MG/DL (ref 0.8–1.5)
GLUCOSE SERPL-MCNC: 177 MG/DL (ref 65–100)
POTASSIUM SERPL-SCNC: 4.8 MMOL/L (ref 3.5–5.1)
SODIUM SERPL-SCNC: 139 MMOL/L (ref 136–145)

## 2022-08-24 PROCEDURE — 3052F HG A1C>EQUAL 8.0%<EQUAL 9.0%: CPT | Performed by: INTERNAL MEDICINE

## 2022-08-24 PROCEDURE — 99214 OFFICE O/P EST MOD 30 MIN: CPT | Performed by: INTERNAL MEDICINE

## 2022-08-24 PROCEDURE — 1036F TOBACCO NON-USER: CPT | Performed by: INTERNAL MEDICINE

## 2022-08-24 PROCEDURE — G8417 CALC BMI ABV UP PARAM F/U: HCPCS | Performed by: INTERNAL MEDICINE

## 2022-08-24 PROCEDURE — G8427 DOCREV CUR MEDS BY ELIG CLIN: HCPCS | Performed by: INTERNAL MEDICINE

## 2022-08-24 PROCEDURE — 1123F ACP DISCUSS/DSCN MKR DOCD: CPT | Performed by: INTERNAL MEDICINE

## 2022-08-24 RX ORDER — HYDROCHLOROTHIAZIDE 12.5 MG/1
12.5 CAPSULE, GELATIN COATED ORAL EVERY MORNING
Qty: 30 CAPSULE | Refills: 5 | Status: SHIPPED | OUTPATIENT
Start: 2022-08-24

## 2022-08-24 ASSESSMENT — PATIENT HEALTH QUESTIONNAIRE - PHQ9
SUM OF ALL RESPONSES TO PHQ QUESTIONS 1-9: 0
SUM OF ALL RESPONSES TO PHQ QUESTIONS 1-9: 0
2. FEELING DOWN, DEPRESSED OR HOPELESS: 0
SUM OF ALL RESPONSES TO PHQ QUESTIONS 1-9: 0
1. LITTLE INTEREST OR PLEASURE IN DOING THINGS: 0
SUM OF ALL RESPONSES TO PHQ QUESTIONS 1-9: 0
SUM OF ALL RESPONSES TO PHQ9 QUESTIONS 1 & 2: 0

## 2022-08-24 NOTE — PROGRESS NOTES
8/24/2022 12:59 PM  Location:[facility] [address] [facility]  [place]  Patient #:  [id number]  YOB: 1940          YOUR LAST HEMOGLOBIN A1CS:   No results found for: HBA1C, [id number]    YOUR LAST LIPID PROFILE:   Lab Results   Component Value Date/Time    CHOL 153 04/21/2022 09:46 AM    HDL 36 04/21/2022 09:46 AM    VLDL 30 04/21/2022 09:46 AM         Lab Results   Component Value Date/Time    GFRAA >60 06/28/2022 12:12 PM    BUN 24 06/28/2022 12:12 PM     06/28/2022 12:12 PM    K 4.5 06/28/2022 12:12 PM     06/28/2022 12:12 PM    CO2 28 06/28/2022 12:12 PM           History of Present Illness     Chief Complaint   Patient presents with    Follow-up     4 month   Hands patient states that his blood sugars are relatively well controlled he remains active bothered only by arthritis symptoms. He is caring for his wife who is suffering from chronic back pain. Has been followed by cardiology as well as urology with BPH symptoms which are stable. Mr. Yaneth Gracia is a [de-identified] y.o. male  who presents for ov      No Known Allergies  Past Medical History:   Diagnosis Date    Anemia     Arthritis     Atrial fibrillation ([address]     Predominantly atrial flutter; flecainide 2014; atrial flutter ablation 2/11/16; unclear extent of a fib    Calculus of kidney     Conjunctivitis unspecified     Diabetes mellitus type II, uncontrolled ([address]     BS run between(100-170). Low BS s/sx:weak, shakes, =bs below 60.     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    Status post total right knee replacement 1/14/2020    Type 2 diabetes mellitus ([address] 2004    checks SQBS every 1-2 weeks; HGB A1C 12/23/19:  7.8    Type 2 diabetes mellitus with chronic kidney disease ([facility]     Type 2 diabetes with nephropathy (HCC)     Type I (juvenile type) diabetes mellitus without mention of complication, not stated as uncontrolled     PATIENT DENIES    Unspecified hyperplasia of prostate without urinary obstruction and other lower urinary tract symptoms (LUTS)      Social History     Socioeconomic History    Marital status:      Spouse name: None    Number of children: None    Years of education: None    Highest education level: None   Tobacco Use    Smoking status: Never    Smokeless tobacco: Never   Substance and Sexual Activity    Alcohol use: No    Drug use: No     Social Determinants of Health     Financial Resource Strain: Low Risk     Difficulty of Paying Living Expenses: Not hard at all   Food Insecurity: No Food Insecurity    Worried About Running Out of Food in the Last Year: Never true    Ran Out of Food in the Last Year: Never true     Past Surgical History:   Procedure Laterality Date    ABLATION OF DYSRHYTHMIC FOCUS  2014    BACK SURGERY  1989    bulging disc repair in lumbar region    CATARACT REMOVAL Bilateral     COLONOSCOPY      IR ESOPHAGOSCOPY RIGID/FLUORO      TOTAL HIP ARTHROPLASTY Left 3/1/12     Current Outpatient Medications   Medication Sig Dispense Refill    hydroCHLOROthiazide (MICROZIDE) 12.5 MG capsule Take 1 capsule by mouth every morning 30 capsule 5    insulin regular (NOVOLIN R RELION) 100 UNIT/ML injection 14 units with meals 10 mL 11    enalapril (VASOTEC) 20 MG tablet Take 2 tablets by mouth daily Take 2 tablet by mouth once daily- increased dose from 20mg to 40mg.  90 tablet 1    TUBERCULIN SYR 1CC/25GX5/8\" (BD SYRINGE SLIP TIP) 25G X 5/8\" 1 ML MISC Use to inject b12 once monthly 3 each 3    dilTIAZem (TIAZAC) 120 MG extended release capsule Take 120 mg by mouth daily      finasteride (PROSCAR) 5 MG tablet Take 5 mg by mouth daily      flecainide (TAMBOCOR) 100 MG tablet Cardiovascular:      Rate and Rhythm: Normal rate and regular rhythm. Pulmonary:      Effort: Pulmonary effort is normal.      Breath sounds: Normal breath sounds. Skin:     General: Skin is warm. Neurological:      Mental Status: He is alert. Motor: No weakness. Psychiatric:         Mood and Affect: Mood normal.         Behavior: Behavior normal.         Thought Content: Thought content normal.         Judgment: Judgment normal.       Assessment & Plan    Encounter Diagnoses   Name Primary? Type 2 diabetes mellitus without complication, with long-term current use of insulin (HCC) Yes    Permanent atrial fibrillation (HCC)     Stage 3a chronic kidney disease ([facility]     Adenomatous polyp of ascending colon     Type 2 diabetes with nephropathy (HCC)     Arthritis     Chronic fatigue     Benign prostatic disease     Primary hypertension        Current Outpatient Medications   Medication Sig Dispense Refill    hydroCHLOROthiazide (MICROZIDE) 12.5 MG capsule Take 1 capsule by mouth every morning 30 capsule 5    insulin regular (NOVOLIN R RELION) 100 UNIT/ML injection 14 units with meals 10 mL 11    enalapril (VASOTEC) 20 MG tablet Take 2 tablets by mouth daily Take 2 tablet by mouth once daily- increased dose from 20mg to 40mg.  90 tablet 1    TUBERCULIN SYR 1CC/25GX5/8\" (BD SYRINGE SLIP TIP) 25G X 5/8\" 1 ML MISC Use to inject b12 once monthly 3 each 3    dilTIAZem (TIAZAC) 120 MG extended release capsule Take 120 mg by mouth daily      finasteride (PROSCAR) 5 MG tablet Take 5 mg by mouth daily      flecainide (TAMBOCOR) 100 MG tablet Take 1 tablet by mouth twice daily      insulin NPH (HUMULIN N;NOVOLIN N) 100 UNIT/ML injection vial INJECT 10 UNITS SUBCUTANEOUSLY TWICE DAILY WITH MEALS      meclizine (ANTIVERT) 25 MG tablet Take 25 mg by mouth 3 times daily as needed      rivaroxaban (XARELTO) 20 MG TABS tablet Take 20 mg by mouth Daily with supper      tamsulosin (FLOMAX) 0.4 MG capsule TAKE 1 CAPSULE BY MOUTH ONCE DAILY       No current facility-administered medications for this visit. Orders Placed This Encounter   Procedures    Hemoglobin A1C     Standing Status:   Future     Number of Occurrences:   1     Standing Expiration Date:   6/67/2214    Basic Metabolic Panel     Standing Status:   Future     Number of Occurrences:   1     Standing Expiration Date:   8/24/2023       There are no discontinued medications. 1. Type 2 diabetes mellitus without complication, with long-term current use of insulin ([address]  Previously well controlled  - Hemoglobin A1C; Future  - Basic Metabolic Panel; Future    2. Permanent atrial fibrillation (HCC)  Stable followed by cardiology    3. Stage 3a chronic kidney disease (HCC)  BMP ordered today previously stable    4. Adenomatous polyp of ascending colon  Up-to-date with colonoscopy    5. Type 2 diabetes with nephropathy (HCC)  See above    6. Arthritis  Still problematic uses Tylenol but remains quite active caring for his wife    7. Chronic fatigue       8. Benign prostatic disease  On finasteride and tamsulosin    9. Primary hypertension  Not controlled, will add diuretic which was previously ordered by cardiology but was not started  - hydroCHLOROthiazide (MICROZIDE) 12.5 MG capsule; Take 1 capsule by mouth every morning  Dispense: 30 capsule; Refill: 5      No follow-up provider specified.         Buffy Monzon MD

## 2022-08-25 LAB
EST. AVERAGE GLUCOSE BLD GHB EST-MCNC: 194 MG/DL
HBA1C MFR BLD: 8.4 % (ref 4.8–5.6)

## 2022-08-29 RX ORDER — RIVAROXABAN 20 MG/1
TABLET, FILM COATED ORAL
Qty: 90 TABLET | Refills: 0 | Status: SHIPPED | OUTPATIENT
Start: 2022-08-29 | End: 2022-09-01 | Stop reason: SDUPTHER

## 2022-08-30 NOTE — TELEPHONE ENCOUNTER
MEDICATION REFILL REQUEST      Name of Medication:  Xarelto  Dose:  20 mg  Frequency:  1 a day  Quantity:  30  Days' supply:  30      Pharmacy Name/Location:  [facility]

## 2022-09-01 NOTE — TELEPHONE ENCOUNTER
Requested Prescriptions     Pending Prescriptions Disp Refills    rivaroxaban (XARELTO) 20 MG TABS tablet 90 tablet 3     Sig: TAKE 1 TABLET BY MOUTH ONCE DAILY WITH  DINNER    Send  to sign.

## 2022-09-12 DIAGNOSIS — E11.21 TYPE 2 DIABETES WITH NEPHROPATHY (HCC): Primary | ICD-10-CM

## 2022-09-12 RX ORDER — MECLIZINE HYDROCHLORIDE 25 MG/1
25 TABLET ORAL 3 TIMES DAILY PRN
Qty: 30 TABLET | Refills: 3 | Status: SHIPPED | OUTPATIENT
Start: 2022-09-12 | End: 2022-10-12

## 2022-09-12 RX ORDER — BLOOD-GLUCOSE CALIB. CONTROL
COMBINATION PACKAGE (EA) MISCELLANEOUS
Qty: 1 EACH | Refills: 11 | Status: SHIPPED | OUTPATIENT
Start: 2022-09-12

## 2022-09-12 RX ORDER — BLOOD SUGAR DIAGNOSTIC
STRIP MISCELLANEOUS
Qty: 100 EACH | Refills: 3 | Status: SHIPPED | OUTPATIENT
Start: 2022-09-12

## 2022-09-12 NOTE — TELEPHONE ENCOUNTER
Pt gets his supplies for his Free [place] 2 from mail order. He wasn't sure the name of the company, but stated that our office has ordered the supplies for him previously. Medication Refill Request      Name of Medication : Meclizine      Strength of Medication: 25 mg tablets      Directions:  Take 1 tablet 3 times daily as needed for dizziness      30 day or 90 day supply: 90      Which Pharmacy:[facility]        Medication Refill Request      Name of Medication : Free Style Ciarra 2 strips      Strength of Medication: n/a      Directions: n/a      30 day or 90 day supply: 90      Which Pharmacy:Mail order      Medication Refill Request      Name of Medication : Free Style Ciarra 2 Calibration Solution      Strength of Medication: n/a      Directions: n/a      30 day or 90 day supply: 90      Which Pharmacy:Mail order

## 2022-09-30 RX ORDER — FLECAINIDE ACETATE 100 MG/1
100 TABLET ORAL 2 TIMES DAILY
Qty: 180 TABLET | Refills: 2 | Status: SHIPPED | OUTPATIENT
Start: 2022-09-30

## 2022-09-30 NOTE — TELEPHONE ENCOUNTER
Pt needs refill on Flecainide 100 mg sent to [facility] in [place]. Please call pt with any questions. Thank you.

## 2022-09-30 NOTE — TELEPHONE ENCOUNTER
Requested Prescriptions     Pending Prescriptions Disp Refills    flecainide (TAMBOCOR) 100 MG tablet 180 tablet 2     Sig: Take 1 tablet by mouth 2 times daily Take 1 tablet by mouth twice daily    Send to Dr Landon Alex to sign.

## 2022-09-30 NOTE — TELEPHONE ENCOUNTER
Requested Prescriptions     Pending Prescriptions Disp Refills    flecainide (TAMBOCOR) 100 MG tablet 180 tablet 2     Sig: Take 1 tablet by mouth 2 times daily Take 1 tablet by mouth twice daily    Send to  to sign.

## 2022-10-12 RX ORDER — DILTIAZEM HYDROCHLORIDE 120 MG/1
120 CAPSULE, EXTENDED RELEASE ORAL DAILY
Qty: 90 CAPSULE | Refills: 2 | Status: CANCELLED | OUTPATIENT
Start: 2022-10-12

## 2022-10-12 RX ORDER — DILTIAZEM HYDROCHLORIDE 120 MG/1
120 CAPSULE, COATED, EXTENDED RELEASE ORAL DAILY
Qty: 90 CAPSULE | Refills: 3 | Status: SHIPPED | OUTPATIENT
Start: 2022-10-12

## 2022-10-12 NOTE — TELEPHONE ENCOUNTER
MEDICATION REFILL REQUEST      Name of Medication:  Diltiazem  Dose: 120 mg  Frequency:  1 a day  Quantity:  90  Days' supply:  90      Pharmacy Name/Location:  [id number]

## 2022-12-01 ENCOUNTER — TELEPHONE (OUTPATIENT)
Dept: INTERNAL MEDICINE CLINIC | Facility: CLINIC | Age: 82
End: 2022-12-01

## 2022-12-01 ENCOUNTER — OFFICE VISIT (OUTPATIENT)
Dept: INTERNAL MEDICINE CLINIC | Facility: CLINIC | Age: 82
End: 2022-12-01
Payer: MEDICARE

## 2022-12-01 VITALS
HEART RATE: 60 BPM | WEIGHT: 237.8 LBS | RESPIRATION RATE: 16 BRPM | HEIGHT: 70 IN | TEMPERATURE: 97 F | SYSTOLIC BLOOD PRESSURE: 124 MMHG | DIASTOLIC BLOOD PRESSURE: 58 MMHG | BODY MASS INDEX: 34.04 KG/M2

## 2022-12-01 DIAGNOSIS — E11.21 TYPE 2 DIABETES WITH NEPHROPATHY (HCC): ICD-10-CM

## 2022-12-01 DIAGNOSIS — I15.9 SECONDARY HYPERTENSION: ICD-10-CM

## 2022-12-01 DIAGNOSIS — E11.9 TYPE 2 DIABETES MELLITUS WITHOUT COMPLICATION, WITH LONG-TERM CURRENT USE OF INSULIN (HCC): Primary | ICD-10-CM

## 2022-12-01 DIAGNOSIS — Z79.4 TYPE 2 DIABETES MELLITUS WITHOUT COMPLICATION, WITH LONG-TERM CURRENT USE OF INSULIN (HCC): ICD-10-CM

## 2022-12-01 DIAGNOSIS — N42.9 BENIGN PROSTATIC DISEASE: ICD-10-CM

## 2022-12-01 DIAGNOSIS — Z79.4 TYPE 2 DIABETES MELLITUS WITHOUT COMPLICATION, WITH LONG-TERM CURRENT USE OF INSULIN (HCC): Primary | ICD-10-CM

## 2022-12-01 DIAGNOSIS — M19.90 ARTHRITIS: ICD-10-CM

## 2022-12-01 DIAGNOSIS — E11.9 TYPE 2 DIABETES MELLITUS WITHOUT COMPLICATION, WITH LONG-TERM CURRENT USE OF INSULIN (HCC): ICD-10-CM

## 2022-12-01 DIAGNOSIS — I48.21 PERMANENT ATRIAL FIBRILLATION (HCC): ICD-10-CM

## 2022-12-01 PROCEDURE — G8427 DOCREV CUR MEDS BY ELIG CLIN: HCPCS | Performed by: INTERNAL MEDICINE

## 2022-12-01 PROCEDURE — 1123F ACP DISCUSS/DSCN MKR DOCD: CPT | Performed by: INTERNAL MEDICINE

## 2022-12-01 PROCEDURE — 3078F DIAST BP <80 MM HG: CPT | Performed by: INTERNAL MEDICINE

## 2022-12-01 PROCEDURE — 3052F HG A1C>EQUAL 8.0%<EQUAL 9.0%: CPT | Performed by: INTERNAL MEDICINE

## 2022-12-01 PROCEDURE — 3074F SYST BP LT 130 MM HG: CPT | Performed by: INTERNAL MEDICINE

## 2022-12-01 PROCEDURE — G8484 FLU IMMUNIZE NO ADMIN: HCPCS | Performed by: INTERNAL MEDICINE

## 2022-12-01 PROCEDURE — 1036F TOBACCO NON-USER: CPT | Performed by: INTERNAL MEDICINE

## 2022-12-01 PROCEDURE — 99214 OFFICE O/P EST MOD 30 MIN: CPT | Performed by: INTERNAL MEDICINE

## 2022-12-01 PROCEDURE — G8417 CALC BMI ABV UP PARAM F/U: HCPCS | Performed by: INTERNAL MEDICINE

## 2022-12-01 RX ORDER — CYANOCOBALAMIN 1000 UG/ML
INJECTION, SOLUTION INTRAMUSCULAR; SUBCUTANEOUS
COMMUNITY
Start: 2022-10-19

## 2022-12-01 NOTE — TELEPHONE ENCOUNTER
Pt would like to change from [facility] to the [facility]

## 2022-12-01 NOTE — PROGRESS NOTES
12/1/2022 8:57 AM  Location:[facility] [address] INTERNAL MEDICINE  [place]  Patient #:  [id number]  YOB: 1940          YOUR LAST HEMOGLOBIN A1CS:   No results found for: HBA1C, [id number]    YOUR LAST LIPID PROFILE:   Lab Results   Component Value Date/Time    CHOL 153 04/21/2022 09:46 AM    HDL 36 04/21/2022 09:46 AM    VLDL 30 04/21/2022 09:46 AM         Lab Results   Component Value Date/Time    GFRAA >60 08/24/2022 09:04 AM    BUN 23 08/24/2022 09:04 AM     08/24/2022 09:04 AM    K 4.8 08/24/2022 09:04 AM     08/24/2022 09:04 AM    CO2 28 08/24/2022 09:04 AM           History of Present Illness     Chief Complaint   Patient presents with    3 Month Follow-Up     Pt presents to the office today for a 3 month follow-up      Diabetes     Wants to try Dexcom G6     This patient states he is under some stress due to his wife's chronic illness including severe back pain. He has to remain active around his house doing housework and shopping. He is having difficulty with his CGM and wishes to try Dexcom. He denies any shortness of breath palpitations or chest pain at this time and is followed by cardiology  Mr. Tomas Tidwell is a [de-identified] y.o. male  who presents for office visit      No Known Allergies  Past Medical History:   Diagnosis Date    Anemia     Arthritis     Atrial fibrillation ([address]     Predominantly atrial flutter; flecainide 2014; atrial flutter ablation 2/11/16; unclear extent of a fib    Calculus of kidney     Conjunctivitis unspecified     Diabetes mellitus type II, uncontrolled     BS run between(100-170). Low BS s/sx:weak, shakes, =bs below 60.     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     Type 2 diabetes with nephropathy (HCC)     Type I (juvenile type) diabetes mellitus without mention of complication, not stated as uncontrolled     PATIENT DENIES    Unspecified hyperplasia of prostate without urinary obstruction and other lower urinary tract symptoms (LUTS)      Social History     Socioeconomic History    Marital status:      Spouse name: None    Number of children: None    Years of education: None    Highest education level: None   Tobacco Use    Smoking status: Never    Smokeless tobacco: Never   Substance and Sexual Activity    Alcohol use: No    Drug use: No     Social Determinants of Health     Financial Resource Strain: Low Risk     Difficulty of Paying Living Expenses: Not hard at all   Food Insecurity: No Food Insecurity    Worried About Running Out of Food in the Last Year: Never true    Ran Out of Food in the Last Year: Never true     Past Surgical History:   Procedure Laterality Date    ABLATION OF DYSRHYTHMIC FOCUS  2014    BACK SURGERY  1989    bulging disc repair in lumbar region    CATARACT REMOVAL Bilateral     COLONOSCOPY      IR ESOPHAGOSCOPY RIGID/FLUORO      TOTAL HIP ARTHROPLASTY Left 3/1/12     Current Outpatient Medications   Medication Sig Dispense Refill    cyanocobalamin 1000 MCG/ML injection INJECT 1ML INTO THE MUSCLE EVERY 30 DAYS      insulin regular (NOVOLIN R RELION) 100 UNIT/ML injection Inject 16 Units into the skin 3 times daily (before meals) 15 mL 11    insulin NPH (HUMULIN N;NOVOLIN N) 100 UNIT/ML injection vial INJECT 12 UNITS SUBCUTANEOUSLY TWICE DAILY WITH MEALS 12 mL 11    dilTIAZem (CARDIZEM CD) 120 MG extended release capsule Take 1 capsule by mouth daily 90 capsule 3    flecainide (TAMBOCOR) 100 MG tablet Take 1 tablet by mouth 2 times daily Take 1 tablet by mouth twice daily 180 tablet 2    blood glucose test strips (FREESTYLE PRECISION BRYAN TEST) strip Use to check blood sugars 3 times daily 100 each 3    Blood Glucose Calibration (MEDISENSE HIGH/LOW CONTROL) LIQD Use as directed 1 each 11    rivaroxaban (XARELTO) 20 MG TABS tablet TAKE 1 TABLET BY MOUTH ONCE DAILY WITH  DINNER 90 tablet 3    hydroCHLOROthiazide (MICROZIDE) 12.5 MG capsule Take 1 capsule by mouth every morning 30 capsule 5    TUBERCULIN SYR 1CC/25GX5/8\" (BD SYRINGE SLIP TIP) 25G X 5/8\" 1 ML MISC Use to inject b12 once monthly 3 each 3    finasteride (PROSCAR) 5 MG tablet Take 5 mg by mouth daily      tamsulosin (FLOMAX) 0.4 MG capsule TAKE 1 CAPSULE BY MOUTH ONCE DAILY      enalapril (VASOTEC) 20 MG tablet Take 2 tablets by mouth daily Take 2 tablet by mouth once daily- increased dose from 20mg to 40mg. 90 tablet 1     No current facility-administered medications for this visit. Health Maintenance   Topic Date Due    DTaP/Tdap/Td vaccine (1 - Tdap) Never done    Colorectal Cancer Screen  04/23/2022    Annual Wellness Visit (AWV)  04/22/2023    Depression Screen  08/24/2023    Flu vaccine  Completed    Shingles vaccine  Completed    Pneumococcal 65+ years Vaccine  Completed    COVID-19 Vaccine  Completed    Hepatitis A vaccine  Aged Out    Hib vaccine  Aged Out    Meningococcal (ACWY) vaccine  Aged Out     Family History   Problem Relation Age of Onset    Heart Disease Brother     Pseudochol.  Deficiency Neg Hx     Delayed Awakening Neg Hx     Post-op Nausea/Vomiting Neg Hx     Emergence Delirium Neg Hx     Post-op Cognitive Dysfunction Neg Hx     Other Neg Hx     Cancer Neg Hx     Alzheimer's Disease Mother     Malig Hypertherm Neg Hx     Alzheimer's Disease Brother     Heart Disease Father     Diabetes Sister     Heart Failure Father         congestive             Review of Systems  Review of Systems    BP (!) 124/58 (Site: Left Upper Arm, Position: Standing, Cuff Size: Large Adult)   Pulse 60   Temp 97 °F (36.1 °C) (Temporal)   Resp 16   Ht 5' 10\" (1.778 m)   Wt 237 lb 12.8 oz (107.9 kg)   BMI 34.12 kg/m²       Physical Exam    Physical Exam  Constitutional:       General: He is not in acute distress. Appearance: Normal appearance. He is not ill-appearing. HENT:      Head: Normocephalic and atraumatic. Eyes:      Extraocular Movements: Extraocular movements intact. Pupils: Pupils are equal, round, and reactive to light. Cardiovascular:      Rate and Rhythm: Normal rate and regular rhythm. Pulmonary:      Effort: Pulmonary effort is normal.      Breath sounds: Normal breath sounds. Skin:     General: Skin is warm. Neurological:      Mental Status: He is alert. Motor: No weakness. Psychiatric:         Mood and Affect: Mood normal.         Behavior: Behavior normal.         Thought Content: Thought content normal.         Judgment: Judgment normal.       Assessment & Plan    Encounter Diagnoses   Name Primary?     Type 2 diabetes mellitus without complication, with long-term current use of insulin (HCC) Yes    Permanent atrial fibrillation (HCC)     Secondary hypertension     Type 2 diabetes with nephropathy (HCC)     Arthritis     Benign prostatic disease        Current Outpatient Medications   Medication Sig Dispense Refill    cyanocobalamin 1000 MCG/ML injection INJECT 1ML INTO THE MUSCLE EVERY 30 DAYS      insulin regular (NOVOLIN R RELION) 100 UNIT/ML injection Inject 16 Units into the skin 3 times daily (before meals) 15 mL 11    insulin NPH (HUMULIN N;NOVOLIN N) 100 UNIT/ML injection vial INJECT 12 UNITS SUBCUTANEOUSLY TWICE DAILY WITH MEALS 12 mL 11    dilTIAZem (CARDIZEM CD) 120 MG extended release capsule Take 1 capsule by mouth daily 90 capsule 3    flecainide (TAMBOCOR) 100 MG tablet Take 1 tablet by mouth 2 times daily Take 1 tablet by mouth twice daily 180 tablet 2    blood glucose test strips (FREESTYLE PRECISION BRYAN TEST) strip Use to check blood sugars 3 times daily 100 each 3    Blood Glucose Calibration (MEDISENSE HIGH/LOW CONTROL) LIQD Use as directed 1 each 11    rivaroxaban (XARELTO) 20 MG TABS tablet TAKE 1 TABLET BY MOUTH ONCE DAILY WITH  DINNER 90 tablet 3    hydroCHLOROthiazide (MICROZIDE) 12.5 MG capsule Take 1 capsule by mouth every morning 30 capsule 5    TUBERCULIN SYR 1CC/25GX5/8\" (BD SYRINGE SLIP TIP) 25G X 5/8\" 1 ML MISC Use to inject b12 once monthly 3 each 3    finasteride (PROSCAR) 5 MG tablet Take 5 mg by mouth daily      tamsulosin (FLOMAX) 0.4 MG capsule TAKE 1 CAPSULE BY MOUTH ONCE DAILY      enalapril (VASOTEC) 20 MG tablet Take 2 tablets by mouth daily Take 2 tablet by mouth once daily- increased dose from 20mg to 40mg. 90 tablet 1     No current facility-administered medications for this visit. Orders Placed This Encounter   Procedures    Basic Metabolic Panel     Standing Status:   Future     Standing Expiration Date:   12/1/2023    Hemoglobin A1C     Standing Status:   Future     Standing Expiration Date:   12/1/2023       Medications Discontinued During This Encounter   Medication Reason    dilTIAZem (TIAZAC) 120 MG extended release capsule LIST CLEANUP    insulin NPH (HUMULIN N;NOVOLIN N) 100 UNIT/ML injection vial REORDER    insulin regular (NOVOLIN R RELION) 100 UNIT/ML injection REORDER       1. Type 2 diabetes mellitus without complication, with long-term current use of insulin (HCC)     - insulin regular (NOVOLIN R RELION) 100 UNIT/ML injection; Inject 16 Units into the skin 3 times daily (before meals)  Dispense: 15 mL; Refill: 11  - insulin NPH (HUMULIN N;NOVOLIN N) 100 UNIT/ML injection vial; INJECT 12 UNITS SUBCUTANEOUSLY TWICE DAILY WITH MEALS  Dispense: 12 mL; Refill: 11  - Basic Metabolic Panel; Future  - Hemoglobin A1C; Future    2. Permanent atrial fibrillation ([address]  Followed by cardiology stable    3. Secondary hypertension  Controlled    4.  Type 2 diabetes with nephropathy ([facility]       5. Arthritis       6. Benign prostatic disease         No follow-up provider specified.         Suzanne Landon MD

## 2022-12-02 LAB
ANION GAP SERPL CALC-SCNC: 3 MMOL/L (ref 2–11)
BUN SERPL-MCNC: 28 MG/DL (ref 8–23)
CALCIUM SERPL-MCNC: 9.4 MG/DL (ref 8.3–10.4)
CHLORIDE SERPL-SCNC: 108 MMOL/L (ref 101–110)
CO2 SERPL-SCNC: 26 MMOL/L (ref 21–32)
CREAT SERPL-MCNC: 1.5 MG/DL (ref 0.8–1.5)
EST. AVERAGE GLUCOSE BLD GHB EST-MCNC: 194 MG/DL
GLUCOSE SERPL-MCNC: 203 MG/DL (ref 65–100)
HBA1C MFR BLD: 8.4 % (ref 4.8–5.6)
POTASSIUM SERPL-SCNC: 4.6 MMOL/L (ref 3.5–5.1)
SODIUM SERPL-SCNC: 137 MMOL/L (ref 133–143)

## 2022-12-16 ENCOUNTER — OFFICE VISIT (OUTPATIENT)
Dept: CARDIOLOGY CLINIC | Age: 82
End: 2022-12-16

## 2022-12-16 VITALS
HEIGHT: 70 IN | WEIGHT: 239 LBS | SYSTOLIC BLOOD PRESSURE: 144 MMHG | BODY MASS INDEX: 34.22 KG/M2 | DIASTOLIC BLOOD PRESSURE: 60 MMHG | HEART RATE: 58 BPM

## 2022-12-16 DIAGNOSIS — I10 PRIMARY HYPERTENSION: ICD-10-CM

## 2022-12-16 DIAGNOSIS — Z79.01 ANTICOAGULANT LONG-TERM USE: ICD-10-CM

## 2022-12-16 DIAGNOSIS — E11.21 TYPE 2 DIABETES WITH NEPHROPATHY (HCC): ICD-10-CM

## 2022-12-16 DIAGNOSIS — I48.0 PAROXYSMAL ATRIAL FIBRILLATION (HCC): Primary | ICD-10-CM

## 2022-12-16 ASSESSMENT — ENCOUNTER SYMPTOMS: SHORTNESS OF BREATH: 0

## 2022-12-16 NOTE — PROGRESS NOTES
without urinary obstruction and other lower urinary tract symptoms (LUTS)      Past Surgical History:   Procedure Laterality Date    ABLATION OF DYSRHYTHMIC FOCUS  2014    BACK SURGERY  1989    bulging disc repair in lumbar region    CATARACT REMOVAL Bilateral     COLONOSCOPY      IR ESOPHAGOSCOPY RIGID/FLUORO      TOTAL HIP ARTHROPLASTY Left 3/1/12     Family History   Problem Relation Age of Onset    Heart Disease Brother     Pseudochol. Deficiency Neg Hx     Delayed Awakening Neg Hx     Post-op Nausea/Vomiting Neg Hx     Emergence Delirium Neg Hx     Post-op Cognitive Dysfunction Neg Hx     Other Neg Hx     Cancer Neg Hx     Alzheimer's Disease Mother     Malig Hypertherm Neg Hx     Alzheimer's Disease Brother     Heart Disease Father     Diabetes Sister     Heart Failure Father         congestive      Social History     Tobacco Use    Smoking status: Never    Smokeless tobacco: Never   Substance Use Topics    Alcohol use: No       ROS:    Review of Systems   Constitutional: Negative for decreased appetite. Cardiovascular:  Negative for chest pain, dyspnea on exertion, irregular heartbeat and near-syncope. Respiratory:  Negative for shortness of breath. Hematologic/Lymphatic: Negative for bleeding problem. PHYSICAL EXAM:    BP (!) 144/60   Pulse 58   Ht 5' 10\" (1.778 m)   Wt 239 lb (108.4 kg)   BMI 34.29 kg/m²        Wt Readings from Last 3 Encounters:   12/16/22 239 lb (108.4 kg)   12/01/22 237 lb 12.8 oz (107.9 kg)   08/24/22 239 lb 12.8 oz (108.8 kg)     BP Readings from Last 3 Encounters:   12/16/22 (!) 144/60   12/01/22 (!) 124/58   08/24/22 (!) 153/65         Physical Exam  Constitutional:       General: He is not in acute distress. Cardiovascular:      Rate and Rhythm: Normal rate and regular rhythm. Heart sounds: No murmur heard. No gallop. Pulmonary:      Effort: Pulmonary effort is normal.      Breath sounds: Normal breath sounds. No rales.    Musculoskeletal: General: No swelling. Neurological:      Mental Status: He is alert. Medical problems and test results were reviewed with the patient today. Results for orders placed or performed in visit on 12/16/22   EKG 12 lead    Impression    Sinus rhythm at 58 with some sinus arrhythmia. Slight interventricular conduction delay poor R wave progression no acute ST changes. No significant changes have occurred     Lab Results   Component Value Date/Time     12/01/2022 09:17 AM    K 4.6 12/01/2022 09:17 AM     12/01/2022 09:17 AM    CO2 26 12/01/2022 09:17 AM    BUN 28 12/01/2022 09:17 AM    GFRAA >60 08/24/2022 09:04 AM     Lab Results   Component Value Date/Time    CHOL 153 04/21/2022 09:46 AM    HDL 36 04/21/2022 09:46 AM    VLDL 30 04/21/2022 09:46 AM         ASSESSMENT and PLAN    Jaya Mercado was seen today for atrial fibrillation. Diagnoses and all orders for this visit:    Atrial fibrillation ([address] still in sinus rhythm is taking flecainide for years started by previous cardiology. With kept him on it and he had no problems he had no angina no history of obstructive disease.  -     EKG 12 lead    Primary hypertension currently stable on multiple different meds is on diltiazem at this time will stay on that. Also stay on enalapril    Type 2 diabetes with nephropathy ([address] currently stable    Anticoagulant long-term use goal he is on blood thinner with no bleeding    He will come back follow-up us in the [facility] office with one of my new partners  [unfilled]      No follow-up provider specified.     Trinity Will MD  12/16/2022  4:44 PM

## 2023-02-18 DIAGNOSIS — I10 HYPERTENSION, UNSPECIFIED TYPE: ICD-10-CM

## 2023-02-18 DIAGNOSIS — N18.30 TYPE 2 DIABETES MELLITUS WITH STAGE 3 CHRONIC KIDNEY DISEASE, WITH LONG-TERM CURRENT USE OF INSULIN, UNSPECIFIED WHETHER STAGE 3A OR 3B CKD (HCC): ICD-10-CM

## 2023-02-18 DIAGNOSIS — E11.22 TYPE 2 DIABETES MELLITUS WITH STAGE 3 CHRONIC KIDNEY DISEASE, WITH LONG-TERM CURRENT USE OF INSULIN, UNSPECIFIED WHETHER STAGE 3A OR 3B CKD (HCC): ICD-10-CM

## 2023-02-18 DIAGNOSIS — Z79.4 TYPE 2 DIABETES MELLITUS WITH STAGE 3 CHRONIC KIDNEY DISEASE, WITH LONG-TERM CURRENT USE OF INSULIN, UNSPECIFIED WHETHER STAGE 3A OR 3B CKD (HCC): ICD-10-CM

## 2023-02-20 RX ORDER — ENALAPRIL MALEATE 20 MG/1
TABLET ORAL
Qty: 90 TABLET | Refills: 3 | Status: SHIPPED | OUTPATIENT
Start: 2023-02-20

## 2023-03-22 ENCOUNTER — OFFICE VISIT (OUTPATIENT)
Dept: INTERNAL MEDICINE CLINIC | Facility: CLINIC | Age: 83
End: 2023-03-22
Payer: MEDICARE

## 2023-03-22 VITALS
BODY MASS INDEX: 33.21 KG/M2 | HEART RATE: 55 BPM | WEIGHT: 232 LBS | DIASTOLIC BLOOD PRESSURE: 67 MMHG | OXYGEN SATURATION: 97 % | TEMPERATURE: 97 F | SYSTOLIC BLOOD PRESSURE: 144 MMHG | RESPIRATION RATE: 18 BRPM | HEIGHT: 70 IN

## 2023-03-22 DIAGNOSIS — I10 PRIMARY HYPERTENSION: ICD-10-CM

## 2023-03-22 DIAGNOSIS — I48.0 PAROXYSMAL ATRIAL FIBRILLATION (HCC): ICD-10-CM

## 2023-03-22 DIAGNOSIS — E11.21 TYPE 2 DIABETES WITH NEPHROPATHY (HCC): ICD-10-CM

## 2023-03-22 DIAGNOSIS — D12.2 ADENOMATOUS POLYP OF ASCENDING COLON: ICD-10-CM

## 2023-03-22 DIAGNOSIS — E11.22 TYPE 2 DIABETES MELLITUS WITH STAGE 3 CHRONIC KIDNEY DISEASE, WITH LONG-TERM CURRENT USE OF INSULIN, UNSPECIFIED WHETHER STAGE 3A OR 3B CKD (HCC): Primary | ICD-10-CM

## 2023-03-22 DIAGNOSIS — Z23 NEED FOR PROPHYLACTIC VACCINATION AGAINST STREPTOCOCCUS PNEUMONIAE (PNEUMOCOCCUS): ICD-10-CM

## 2023-03-22 DIAGNOSIS — R53.82 CHRONIC FATIGUE: ICD-10-CM

## 2023-03-22 DIAGNOSIS — N18.30 TYPE 2 DIABETES MELLITUS WITH STAGE 3 CHRONIC KIDNEY DISEASE, WITH LONG-TERM CURRENT USE OF INSULIN, UNSPECIFIED WHETHER STAGE 3A OR 3B CKD (HCC): Primary | ICD-10-CM

## 2023-03-22 DIAGNOSIS — N42.9 BENIGN PROSTATIC DISEASE: ICD-10-CM

## 2023-03-22 DIAGNOSIS — Z79.4 TYPE 2 DIABETES MELLITUS WITH STAGE 3 CHRONIC KIDNEY DISEASE, WITH LONG-TERM CURRENT USE OF INSULIN, UNSPECIFIED WHETHER STAGE 3A OR 3B CKD (HCC): Primary | ICD-10-CM

## 2023-03-22 LAB
ALBUMIN SERPL-MCNC: 4.1 G/DL (ref 3.2–4.6)
ALBUMIN/GLOB SERPL: 1.5 (ref 0.4–1.6)
ALP SERPL-CCNC: 70 U/L (ref 50–136)
ALT SERPL-CCNC: 26 U/L (ref 12–65)
ANION GAP SERPL CALC-SCNC: 3 MMOL/L (ref 2–11)
AST SERPL-CCNC: 19 U/L (ref 15–37)
BASOPHILS # BLD: 0 K/UL (ref 0–0.2)
BASOPHILS NFR BLD: 1 % (ref 0–2)
BILIRUB SERPL-MCNC: 0.3 MG/DL (ref 0.2–1.1)
BUN SERPL-MCNC: 32 MG/DL (ref 8–23)
CALCIUM SERPL-MCNC: 9.1 MG/DL (ref 8.3–10.4)
CHLORIDE SERPL-SCNC: 104 MMOL/L (ref 101–110)
CHOLEST SERPL-MCNC: 150 MG/DL
CO2 SERPL-SCNC: 29 MMOL/L (ref 21–32)
CREAT SERPL-MCNC: 1.3 MG/DL (ref 0.8–1.5)
CREAT UR-MCNC: 90 MG/DL
DIFFERENTIAL METHOD BLD: ABNORMAL
EOSINOPHIL # BLD: 0.1 K/UL (ref 0–0.8)
EOSINOPHIL NFR BLD: 3 % (ref 0.5–7.8)
ERYTHROCYTE [DISTWIDTH] IN BLOOD BY AUTOMATED COUNT: 13.2 % (ref 11.9–14.6)
EST. AVERAGE GLUCOSE BLD GHB EST-MCNC: 174 MG/DL
GLOBULIN SER CALC-MCNC: 2.8 G/DL (ref 2.8–4.5)
GLUCOSE SERPL-MCNC: 238 MG/DL (ref 65–100)
HBA1C MFR BLD: 7.7 % (ref 4.8–5.6)
HCT VFR BLD AUTO: 38.3 % (ref 41.1–50.3)
HDLC SERPL-MCNC: 36 MG/DL (ref 40–60)
HDLC SERPL: 4.2
HGB BLD-MCNC: 12.1 G/DL (ref 13.6–17.2)
IMM GRANULOCYTES # BLD AUTO: 0 K/UL (ref 0–0.5)
IMM GRANULOCYTES NFR BLD AUTO: 0 % (ref 0–5)
LDLC SERPL CALC-MCNC: 70 MG/DL
LYMPHOCYTES # BLD: 1.3 K/UL (ref 0.5–4.6)
LYMPHOCYTES NFR BLD: 31 % (ref 13–44)
MCH RBC QN AUTO: 31.2 PG (ref 26.1–32.9)
MCHC RBC AUTO-ENTMCNC: 31.6 G/DL (ref 31.4–35)
MCV RBC AUTO: 98.7 FL (ref 82–102)
MICROALBUMIN UR-MCNC: 2.31 MG/DL (ref 0–3)
MICROALBUMIN/CREAT UR-RTO: 26 MG/G (ref 0–30)
MONOCYTES # BLD: 0.5 K/UL (ref 0.1–1.3)
MONOCYTES NFR BLD: 11 % (ref 4–12)
NEUTS SEG # BLD: 2.2 K/UL (ref 1.7–8.2)
NEUTS SEG NFR BLD: 54 % (ref 43–78)
NRBC # BLD: 0 K/UL (ref 0–0.2)
PLATELET # BLD AUTO: 153 K/UL (ref 150–450)
PMV BLD AUTO: 10.9 FL (ref 9.4–12.3)
POTASSIUM SERPL-SCNC: 4.6 MMOL/L (ref 3.5–5.1)
PROT SERPL-MCNC: 6.9 G/DL (ref 6.3–8.2)
RBC # BLD AUTO: 3.88 M/UL (ref 4.23–5.6)
SODIUM SERPL-SCNC: 136 MMOL/L (ref 133–143)
TRIGL SERPL-MCNC: 220 MG/DL (ref 35–150)
VLDLC SERPL CALC-MCNC: 44 MG/DL (ref 6–23)
WBC # BLD AUTO: 4.1 K/UL (ref 4.3–11.1)

## 2023-03-22 PROCEDURE — 3077F SYST BP >= 140 MM HG: CPT | Performed by: INTERNAL MEDICINE

## 2023-03-22 PROCEDURE — 1036F TOBACCO NON-USER: CPT | Performed by: INTERNAL MEDICINE

## 2023-03-22 PROCEDURE — 90677 PCV20 VACCINE IM: CPT | Performed by: INTERNAL MEDICINE

## 2023-03-22 PROCEDURE — G0009 ADMIN PNEUMOCOCCAL VACCINE: HCPCS | Performed by: INTERNAL MEDICINE

## 2023-03-22 PROCEDURE — 1123F ACP DISCUSS/DSCN MKR DOCD: CPT | Performed by: INTERNAL MEDICINE

## 2023-03-22 PROCEDURE — G8484 FLU IMMUNIZE NO ADMIN: HCPCS | Performed by: INTERNAL MEDICINE

## 2023-03-22 PROCEDURE — 99214 OFFICE O/P EST MOD 30 MIN: CPT | Performed by: INTERNAL MEDICINE

## 2023-03-22 PROCEDURE — 3078F DIAST BP <80 MM HG: CPT | Performed by: INTERNAL MEDICINE

## 2023-03-22 PROCEDURE — G8417 CALC BMI ABV UP PARAM F/U: HCPCS | Performed by: INTERNAL MEDICINE

## 2023-03-22 PROCEDURE — G8427 DOCREV CUR MEDS BY ELIG CLIN: HCPCS | Performed by: INTERNAL MEDICINE

## 2023-03-22 SDOH — ECONOMIC STABILITY: HOUSING INSECURITY
IN THE LAST 12 MONTHS, WAS THERE A TIME WHEN YOU DID NOT HAVE A STEADY PLACE TO SLEEP OR SLEPT IN A SHELTER (INCLUDING NOW)?: NO

## 2023-03-22 SDOH — ECONOMIC STABILITY: FOOD INSECURITY: WITHIN THE PAST 12 MONTHS, THE FOOD YOU BOUGHT JUST DIDN'T LAST AND YOU DIDN'T HAVE MONEY TO GET MORE.: SOMETIMES TRUE

## 2023-03-22 SDOH — ECONOMIC STABILITY: FOOD INSECURITY: WITHIN THE PAST 12 MONTHS, YOU WORRIED THAT YOUR FOOD WOULD RUN OUT BEFORE YOU GOT MONEY TO BUY MORE.: SOMETIMES TRUE

## 2023-03-22 SDOH — ECONOMIC STABILITY: INCOME INSECURITY: HOW HARD IS IT FOR YOU TO PAY FOR THE VERY BASICS LIKE FOOD, HOUSING, MEDICAL CARE, AND HEATING?: VERY HARD

## 2023-03-22 ASSESSMENT — PATIENT HEALTH QUESTIONNAIRE - PHQ9
SUM OF ALL RESPONSES TO PHQ QUESTIONS 1-9: 0
SUM OF ALL RESPONSES TO PHQ QUESTIONS 1-9: 0
1. LITTLE INTEREST OR PLEASURE IN DOING THINGS: 0
SUM OF ALL RESPONSES TO PHQ QUESTIONS 1-9: 0
2. FEELING DOWN, DEPRESSED OR HOPELESS: 0
SUM OF ALL RESPONSES TO PHQ9 QUESTIONS 1 & 2: 0
SUM OF ALL RESPONSES TO PHQ QUESTIONS 1-9: 0

## 2023-03-22 NOTE — PROGRESS NOTES
check blood sugars 3 times daily 100 each 3    Blood Glucose Calibration (MEDISENSE HIGH/LOW CONTROL) LIQD Use as directed 1 each 11    rivaroxaban (XARELTO) 20 MG TABS tablet TAKE 1 TABLET BY MOUTH ONCE DAILY WITH  DINNER 90 tablet 3    TUBERCULIN SYR 1CC/25GX5/8\" (BD SYRINGE SLIP TIP) 25G X 5/8\" 1 ML MISC Use to inject b12 once monthly 3 each 3    finasteride (PROSCAR) 5 MG tablet Take 5 mg by mouth daily      tamsulosin (FLOMAX) 0.4 MG capsule TAKE 1 CAPSULE BY MOUTH ONCE DAILY       No current facility-administered medications for this visit. Health Maintenance   Topic Date Due    DTaP/Tdap/Td vaccine (1 - Tdap) Never done    Colorectal Cancer Screen  04/23/2022    Annual Wellness Visit (AWV)  04/22/2023    Depression Screen  08/24/2023    Flu vaccine  Completed    Shingles vaccine  Completed    Pneumococcal 65+ years Vaccine  Completed    COVID-19 Vaccine  Completed    Hepatitis A vaccine  Aged Out    Hib vaccine  Aged Out    Meningococcal (ACWY) vaccine  Aged Out     Family History   Problem Relation Age of Onset    Heart Disease Brother     Pseudochol. Deficiency Neg Hx     Delayed Awakening Neg Hx     Post-op Nausea/Vomiting Neg Hx     Emergence Delirium Neg Hx     Post-op Cognitive Dysfunction Neg Hx     Other Neg Hx     Cancer Neg Hx     Alzheimer's Disease Mother     Malig Hypertherm Neg Hx     Alzheimer's Disease Brother     Heart Disease Father     Diabetes Sister     Heart Failure Father         congestive             Review of Systems  Review of Systems    BP (!) 144/67 (Site: Left Upper Arm, Position: Sitting, Cuff Size: Large Adult)   Pulse 55   Temp 97 °F (36.1 °C) (Temporal)   Resp 18   Ht 5' 10\" (1.778 m)   Wt 232 lb (105.2 kg)   SpO2 97% Comment: ra  BMI 33.29 kg/m²       Physical Exam    Physical Exam  Constitutional:       General: He is not in acute distress. Appearance: Normal appearance. He is not ill-appearing. HENT:      Head: Normocephalic and atraumatic.    Eyes:

## 2023-04-17 ENCOUNTER — NURSE ONLY (OUTPATIENT)
Dept: UROLOGY | Age: 83
End: 2023-04-17

## 2023-04-17 DIAGNOSIS — N40.1 BENIGN PROSTATIC HYPERPLASIA WITH LOWER URINARY TRACT SYMPTOMS, SYMPTOM DETAILS UNSPECIFIED: ICD-10-CM

## 2023-04-18 LAB — PSA SERPL-MCNC: 0.2 NG/ML

## 2023-04-24 ENCOUNTER — OFFICE VISIT (OUTPATIENT)
Dept: UROLOGY | Age: 83
End: 2023-04-24
Payer: MEDICARE

## 2023-04-24 DIAGNOSIS — N40.1 BENIGN PROSTATIC HYPERPLASIA WITH LOWER URINARY TRACT SYMPTOMS, SYMPTOM DETAILS UNSPECIFIED: Primary | ICD-10-CM

## 2023-04-24 DIAGNOSIS — R31.0 GROSS HEMATURIA: ICD-10-CM

## 2023-04-24 LAB
BILIRUBIN, URINE, POC: NEGATIVE
BLOOD URINE, POC: NEGATIVE
GLUCOSE URINE, POC: 500
KETONES, URINE, POC: NEGATIVE
LEUKOCYTE ESTERASE, URINE, POC: NEGATIVE
NITRITE, URINE, POC: NEGATIVE
PH, URINE, POC: 5.5 (ref 4.6–8)
PROTEIN,URINE, POC: NEGATIVE
SPECIFIC GRAVITY, URINE, POC: NORMAL (ref 1–1.03)
URINALYSIS CLARITY, POC: NORMAL
URINALYSIS COLOR, POC: NORMAL
UROBILINOGEN, POC: NORMAL

## 2023-04-24 PROCEDURE — 1123F ACP DISCUSS/DSCN MKR DOCD: CPT | Performed by: UROLOGY

## 2023-04-24 PROCEDURE — G8427 DOCREV CUR MEDS BY ELIG CLIN: HCPCS | Performed by: UROLOGY

## 2023-04-24 PROCEDURE — 1036F TOBACCO NON-USER: CPT | Performed by: UROLOGY

## 2023-04-24 PROCEDURE — 99213 OFFICE O/P EST LOW 20 MIN: CPT | Performed by: UROLOGY

## 2023-04-24 PROCEDURE — G8417 CALC BMI ABV UP PARAM F/U: HCPCS | Performed by: UROLOGY

## 2023-04-24 PROCEDURE — 81003 URINALYSIS AUTO W/O SCOPE: CPT | Performed by: UROLOGY

## 2023-04-24 RX ORDER — FINASTERIDE 5 MG/1
5 TABLET, FILM COATED ORAL DAILY
Qty: 90 TABLET | Refills: 3 | Status: SHIPPED | OUTPATIENT
Start: 2023-04-24

## 2023-04-24 RX ORDER — TAMSULOSIN HYDROCHLORIDE 0.4 MG/1
0.4 CAPSULE ORAL DAILY
Qty: 90 CAPSULE | Refills: 3 | Status: SHIPPED | OUTPATIENT
Start: 2023-04-24

## 2023-04-24 ASSESSMENT — ENCOUNTER SYMPTOMS
NAUSEA: 0
BACK PAIN: 0

## 2023-04-24 NOTE — PROGRESS NOTES
[facility]  [address] [place] [address]    [address] [place] [address], [address] [facility]   [facility]  : 1940    Chief Complaint   Patient presents with    Follow-up          HPI     Monik López is a [de-identified] y.o. male with a PMH of DM, BPH/LUTS and intermittent gross hematuria who returns for follow up. Previously followed with Dr. Doyle Fernandez. Seen 2022 and was doing well on flomax/finasteride. He had 2 hematuria work ups in  and  that showed prostate as source. Today, he reports recurrence of his gross hematuria on occasion that clears on its own. No known cause. States it happens mainly at night and urine is red / no clots. Remains on his medications. He is on Xarelto. No bothersome urinary symptoms. Cysto in  with Dr. Doyle Fernandez showed BPH amenable to TURP but not urolift as the source. Has nocturia X 1. He denies retention, UTIs, bothersome urgency, frequency, other urinary symptoms/ concerns. .       Past Medical History:   Diagnosis Date    Anemia     Arthritis     Atrial fibrillation ([address]     Predominantly atrial flutter; flecainide ; atrial flutter ablation 16; unclear extent of a fib    Calculus of kidney     Conjunctivitis unspecified     Diabetes mellitus type II, uncontrolled     BS run between(100-170). Low BS s/sx:weak, shakes, =bs below 60.     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    Status post total right knee replacement 2020    Type 2 diabetes mellitus ([address] 2004    checks SQBS every 1-2 weeks; HGB A1C 19:  7.8    Type 2 diabetes mellitus with chronic kidney disease ([address]     Type 2 diabetes with

## 2023-06-26 ENCOUNTER — OFFICE VISIT (OUTPATIENT)
Age: 83
End: 2023-06-26
Payer: MEDICARE

## 2023-06-26 VITALS
HEART RATE: 47 BPM | DIASTOLIC BLOOD PRESSURE: 72 MMHG | BODY MASS INDEX: 33.64 KG/M2 | SYSTOLIC BLOOD PRESSURE: 147 MMHG | WEIGHT: 235 LBS | HEIGHT: 70 IN

## 2023-06-26 DIAGNOSIS — Z79.4 TYPE 2 DIABETES MELLITUS WITH HYPERGLYCEMIA, WITH LONG-TERM CURRENT USE OF INSULIN (HCC): ICD-10-CM

## 2023-06-26 DIAGNOSIS — I48.0 PAF (PAROXYSMAL ATRIAL FIBRILLATION) (HCC): Primary | ICD-10-CM

## 2023-06-26 DIAGNOSIS — E11.65 TYPE 2 DIABETES MELLITUS WITH HYPERGLYCEMIA, WITH LONG-TERM CURRENT USE OF INSULIN (HCC): ICD-10-CM

## 2023-06-26 DIAGNOSIS — I10 PRIMARY HYPERTENSION: ICD-10-CM

## 2023-06-26 PROCEDURE — 3077F SYST BP >= 140 MM HG: CPT | Performed by: INTERNAL MEDICINE

## 2023-06-26 PROCEDURE — 99214 OFFICE O/P EST MOD 30 MIN: CPT | Performed by: INTERNAL MEDICINE

## 2023-06-26 PROCEDURE — 1036F TOBACCO NON-USER: CPT | Performed by: INTERNAL MEDICINE

## 2023-06-26 PROCEDURE — 1123F ACP DISCUSS/DSCN MKR DOCD: CPT | Performed by: INTERNAL MEDICINE

## 2023-06-26 PROCEDURE — G8417 CALC BMI ABV UP PARAM F/U: HCPCS | Performed by: INTERNAL MEDICINE

## 2023-06-26 PROCEDURE — G8428 CUR MEDS NOT DOCUMENT: HCPCS | Performed by: INTERNAL MEDICINE

## 2023-06-26 PROCEDURE — 3051F HG A1C>EQUAL 7.0%<8.0%: CPT | Performed by: INTERNAL MEDICINE

## 2023-06-26 PROCEDURE — 3078F DIAST BP <80 MM HG: CPT | Performed by: INTERNAL MEDICINE

## 2023-06-26 RX ORDER — DILTIAZEM HYDROCHLORIDE 120 MG/1
120 CAPSULE, COATED, EXTENDED RELEASE ORAL DAILY
Qty: 90 CAPSULE | Refills: 3 | Status: SHIPPED | OUTPATIENT
Start: 2023-06-26

## 2023-06-26 RX ORDER — FLECAINIDE ACETATE 100 MG/1
100 TABLET ORAL 2 TIMES DAILY
Qty: 180 TABLET | Refills: 3 | Status: SHIPPED | OUTPATIENT
Start: 2023-06-26

## 2023-06-26 RX ORDER — HYDROCHLOROTHIAZIDE 12.5 MG/1
12.5 TABLET ORAL DAILY
COMMUNITY

## 2023-07-07 RX ORDER — CYANOCOBALAMIN 1000 UG/ML
INJECTION, SOLUTION INTRAMUSCULAR; SUBCUTANEOUS
Qty: 3 ML | Refills: 5 | Status: SHIPPED | OUTPATIENT
Start: 2023-07-07

## 2023-07-12 ENCOUNTER — OFFICE VISIT (OUTPATIENT)
Dept: INTERNAL MEDICINE CLINIC | Facility: CLINIC | Age: 83
End: 2023-07-12
Payer: MEDICARE

## 2023-07-12 VITALS
TEMPERATURE: 97.5 F | WEIGHT: 240 LBS | SYSTOLIC BLOOD PRESSURE: 124 MMHG | BODY MASS INDEX: 34.36 KG/M2 | OXYGEN SATURATION: 96 % | HEART RATE: 46 BPM | HEIGHT: 70 IN | DIASTOLIC BLOOD PRESSURE: 64 MMHG

## 2023-07-12 DIAGNOSIS — M19.90 ARTHRITIS: ICD-10-CM

## 2023-07-12 DIAGNOSIS — I10 PRIMARY HYPERTENSION: ICD-10-CM

## 2023-07-12 DIAGNOSIS — D12.2 ADENOMATOUS POLYP OF ASCENDING COLON: ICD-10-CM

## 2023-07-12 DIAGNOSIS — Z79.4 TYPE 2 DIABETES MELLITUS WITH HYPERGLYCEMIA, WITH LONG-TERM CURRENT USE OF INSULIN (HCC): ICD-10-CM

## 2023-07-12 DIAGNOSIS — R53.82 CHRONIC FATIGUE: ICD-10-CM

## 2023-07-12 DIAGNOSIS — D68.69 SECONDARY HYPERCOAGULABLE STATE (HCC): ICD-10-CM

## 2023-07-12 DIAGNOSIS — Z00.00 MEDICARE ANNUAL WELLNESS VISIT, SUBSEQUENT: Primary | ICD-10-CM

## 2023-07-12 DIAGNOSIS — N42.9 BENIGN PROSTATIC DISEASE: ICD-10-CM

## 2023-07-12 DIAGNOSIS — E66.09 CLASS 1 OBESITY DUE TO EXCESS CALORIES WITH SERIOUS COMORBIDITY AND BODY MASS INDEX (BMI) OF 34.0 TO 34.9 IN ADULT: ICD-10-CM

## 2023-07-12 DIAGNOSIS — E11.65 TYPE 2 DIABETES MELLITUS WITH HYPERGLYCEMIA, WITH LONG-TERM CURRENT USE OF INSULIN (HCC): ICD-10-CM

## 2023-07-12 DIAGNOSIS — I48.0 PAF (PAROXYSMAL ATRIAL FIBRILLATION) (HCC): ICD-10-CM

## 2023-07-12 DIAGNOSIS — R68.89 OTHER GENERAL SYMPTOMS AND SIGNS: ICD-10-CM

## 2023-07-12 DIAGNOSIS — D50.9 IRON DEFICIENCY ANEMIA, UNSPECIFIED IRON DEFICIENCY ANEMIA TYPE: ICD-10-CM

## 2023-07-12 LAB
ANION GAP SERPL CALC-SCNC: 6 MMOL/L (ref 2–11)
BASOPHILS # BLD: 0 K/UL (ref 0–0.2)
BASOPHILS NFR BLD: 1 % (ref 0–2)
BUN SERPL-MCNC: 26 MG/DL (ref 8–23)
CALCIUM SERPL-MCNC: 9 MG/DL (ref 8.3–10.4)
CHLORIDE SERPL-SCNC: 109 MMOL/L (ref 101–110)
CO2 SERPL-SCNC: 26 MMOL/L (ref 21–32)
CREAT SERPL-MCNC: 1.4 MG/DL (ref 0.8–1.5)
DIFFERENTIAL METHOD BLD: ABNORMAL
EOSINOPHIL # BLD: 0.1 K/UL (ref 0–0.8)
EOSINOPHIL NFR BLD: 3 % (ref 0.5–7.8)
ERYTHROCYTE [DISTWIDTH] IN BLOOD BY AUTOMATED COUNT: 13.3 % (ref 11.9–14.6)
FERRITIN SERPL-MCNC: 71 NG/ML (ref 8–388)
GLUCOSE SERPL-MCNC: 205 MG/DL (ref 65–100)
HCT VFR BLD AUTO: 36.4 % (ref 41.1–50.3)
HGB BLD-MCNC: 11.5 G/DL (ref 13.6–17.2)
IMM GRANULOCYTES # BLD AUTO: 0 K/UL (ref 0–0.5)
IMM GRANULOCYTES NFR BLD AUTO: 0 % (ref 0–5)
IRON SERPL-MCNC: 80 UG/DL (ref 35–150)
LYMPHOCYTES # BLD: 1.4 K/UL (ref 0.5–4.6)
LYMPHOCYTES NFR BLD: 30 % (ref 13–44)
MCH RBC QN AUTO: 31.7 PG (ref 26.1–32.9)
MCHC RBC AUTO-ENTMCNC: 31.6 G/DL (ref 31.4–35)
MCV RBC AUTO: 100.3 FL (ref 82–102)
MONOCYTES # BLD: 0.5 K/UL (ref 0.1–1.3)
MONOCYTES NFR BLD: 12 % (ref 4–12)
NEUTS SEG # BLD: 2.4 K/UL (ref 1.7–8.2)
NEUTS SEG NFR BLD: 54 % (ref 43–78)
NRBC # BLD: 0 K/UL (ref 0–0.2)
PLATELET # BLD AUTO: 139 K/UL (ref 150–450)
PMV BLD AUTO: 11.2 FL (ref 9.4–12.3)
POTASSIUM SERPL-SCNC: 4.8 MMOL/L (ref 3.5–5.1)
RBC # BLD AUTO: 3.63 M/UL (ref 4.23–5.6)
SODIUM SERPL-SCNC: 141 MMOL/L (ref 133–143)
TIBC SERPL-MCNC: 289 UG/DL (ref 250–450)
VIT B12 SERPL-MCNC: 663 PG/ML (ref 193–986)
WBC # BLD AUTO: 4.5 K/UL (ref 4.3–11.1)

## 2023-07-12 PROCEDURE — G0439 PPPS, SUBSEQ VISIT: HCPCS | Performed by: INTERNAL MEDICINE

## 2023-07-12 PROCEDURE — 1123F ACP DISCUSS/DSCN MKR DOCD: CPT | Performed by: INTERNAL MEDICINE

## 2023-07-12 PROCEDURE — G8417 CALC BMI ABV UP PARAM F/U: HCPCS | Performed by: INTERNAL MEDICINE

## 2023-07-12 PROCEDURE — 99213 OFFICE O/P EST LOW 20 MIN: CPT | Performed by: INTERNAL MEDICINE

## 2023-07-12 PROCEDURE — 3074F SYST BP LT 130 MM HG: CPT | Performed by: INTERNAL MEDICINE

## 2023-07-12 PROCEDURE — G8427 DOCREV CUR MEDS BY ELIG CLIN: HCPCS | Performed by: INTERNAL MEDICINE

## 2023-07-12 PROCEDURE — 1036F TOBACCO NON-USER: CPT | Performed by: INTERNAL MEDICINE

## 2023-07-12 PROCEDURE — 3078F DIAST BP <80 MM HG: CPT | Performed by: INTERNAL MEDICINE

## 2023-07-12 PROCEDURE — 3051F HG A1C>EQUAL 7.0%<8.0%: CPT | Performed by: INTERNAL MEDICINE

## 2023-07-12 ASSESSMENT — LIFESTYLE VARIABLES
HOW MANY STANDARD DRINKS CONTAINING ALCOHOL DO YOU HAVE ON A TYPICAL DAY: PATIENT DOES NOT DRINK
HOW OFTEN DO YOU HAVE A DRINK CONTAINING ALCOHOL: NEVER

## 2023-07-12 ASSESSMENT — PATIENT HEALTH QUESTIONNAIRE - PHQ9
SUM OF ALL RESPONSES TO PHQ QUESTIONS 1-9: 0
SUM OF ALL RESPONSES TO PHQ9 QUESTIONS 1 & 2: 0
SUM OF ALL RESPONSES TO PHQ QUESTIONS 1-9: 0
1. LITTLE INTEREST OR PLEASURE IN DOING THINGS: 0
SUM OF ALL RESPONSES TO PHQ QUESTIONS 1-9: 0
2. FEELING DOWN, DEPRESSED OR HOPELESS: 0
SUM OF ALL RESPONSES TO PHQ QUESTIONS 1-9: 0

## 2023-07-12 NOTE — PROGRESS NOTES
7/12/2023 8:33 AM  Location[place] INTERNAL MEDICINE  [place]  Patient #:  [id number]  YOB: 1940          YOUR LAST HEMOGLOBIN A1CS:   No results found for: HBA1C, [id number]    YOUR LAST LIPID PROFILE:   Lab Results   Component Value Date/Time    CHOL 150 03/22/2023 08:48 AM    HDL 36 03/22/2023 08:48 AM    VLDL 30 04/21/2022 09:46 AM         Lab Results   Component Value Date/Time    GFRAA >60 08/24/2022 09:04 AM    BUN 32 03/22/2023 08:48 AM     03/22/2023 08:48 AM    K 4.6 03/22/2023 08:48 AM     03/22/2023 08:48 AM    CO2 29 03/22/2023 08:48 AM           History of Present Illness     Chief Complaint   Patient presents with    Medicare AWV     Subsequent     3 Month Follow-Up     3 month follow-up   This patient says he is having no real problems. His blood sugar level continues to be erratic last hemoglobin A1c of 7.7 which was actually down. He is having no complaints from his A-fib with chest pain or shortness of breath. Mr. Gurvinder Fitzpatrick is a 82 y.o. male  who presents for office visit      No Known Allergies  Past Medical History:   Diagnosis Date    Anemia     Arthritis     Atrial fibrillation ([facility])     Predominantly atrial flutter; flecainide 2014; atrial flutter ablation 2/11/16; unclear extent of a fib    Calculus of kidney     Conjunctivitis unspecified     Diabetes mellitus type II, uncontrolled     BS run between(100-170). Low BS s/sx:weak, shakes, =bs below 60.     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    Status post total right knee replacement 1/14/2020    Type 2 diabetes mellitus ([address] [facility]) 2004    checks SQBS every 1-2 weeks;
Medicare Annual Wellness Visit    Renetta Alberto is here for Medicare AWV (Subsequent ) and 3 Month Follow-Up (3 month follow-up)    Assessment & Plan   Medicare annual wellness visit, subsequent  Recommendations for Preventive Services Due: see orders and patient instructions/AVS.  Recommended screening schedule for the next 5-10 years is provided to the patient in written form: see Patient Instructions/AVS.     No follow-ups on file. Subjective   The following acute and/or chronic problems were also addressed today:   Diagnosis Orders   1. Medicare annual wellness visit, subsequent        2. PAF (paroxysmal atrial fibrillation) ([address])        3. Secondary hypercoagulable state ([address])        4. Primary hypertension        5. Adenomatous polyp of ascending colon        6. Type 2 diabetes mellitus with hyperglycemia, with long-term current use of insulin (HCC)  Hemoglobin V3P    Basic Metabolic Panel      7. Arthritis        8. Benign prostatic disease        9. Chronic fatigue        10. Class 1 obesity due to excess calories with serious comorbidity and body mass index (BMI) of 34.0 to 34.9 in adult        11. Other general symptoms and signs  Vitamin B12      12. Iron deficiency anemia, unspecified iron deficiency anemia type  CBC with Auto Differential    Ferritin    Iron    Total Iron Binding Capacity            Patient's complete Health Risk Assessment and screening values have been reviewed and are found in Flowsheets. The following problems were reviewed today and where indicated follow up appointments were made and/or referrals ordered.     Positive Risk Factor Screenings with Interventions:                 Weight and Activity:  Physical Activity: Sufficiently Active    Days of Exercise per Week: 7 days    Minutes of Exercise per Session: 30 min     On average, how many days per week do you engage in moderate to strenuous exercise (like a brisk walk)?: 7 days  Have you lost any weight without trying in
Reason specified below

## 2023-07-13 LAB
EST. AVERAGE GLUCOSE BLD GHB EST-MCNC: 183 MG/DL
HBA1C MFR BLD: 8 % (ref 4.8–5.6)

## 2023-09-21 RX ORDER — SYRINGE AND NEEDLE,INSULIN,1ML 25GX1"
SYRINGE, EMPTY DISPOSABLE MISCELLANEOUS
Qty: 3 EACH | Refills: 3 | Status: SHIPPED | OUTPATIENT
Start: 2023-09-21

## 2023-12-01 ENCOUNTER — OFFICE VISIT (OUTPATIENT)
Age: 83
End: 2023-12-01

## 2023-12-01 VITALS
HEART RATE: 54 BPM | WEIGHT: 238 LBS | SYSTOLIC BLOOD PRESSURE: 162 MMHG | HEIGHT: 70 IN | DIASTOLIC BLOOD PRESSURE: 70 MMHG | BODY MASS INDEX: 34.07 KG/M2

## 2023-12-01 DIAGNOSIS — N18.30 TYPE 2 DIABETES MELLITUS WITH STAGE 3 CHRONIC KIDNEY DISEASE, WITH LONG-TERM CURRENT USE OF INSULIN, UNSPECIFIED WHETHER STAGE 3A OR 3B CKD (HCC): ICD-10-CM

## 2023-12-01 DIAGNOSIS — I10 HYPERTENSION, UNSPECIFIED TYPE: ICD-10-CM

## 2023-12-01 DIAGNOSIS — Z79.4 TYPE 2 DIABETES MELLITUS WITH STAGE 3 CHRONIC KIDNEY DISEASE, WITH LONG-TERM CURRENT USE OF INSULIN, UNSPECIFIED WHETHER STAGE 3A OR 3B CKD (HCC): ICD-10-CM

## 2023-12-01 DIAGNOSIS — I10 PRIMARY HYPERTENSION: ICD-10-CM

## 2023-12-01 DIAGNOSIS — E11.22 TYPE 2 DIABETES MELLITUS WITH STAGE 3 CHRONIC KIDNEY DISEASE, WITH LONG-TERM CURRENT USE OF INSULIN, UNSPECIFIED WHETHER STAGE 3A OR 3B CKD (HCC): ICD-10-CM

## 2023-12-01 DIAGNOSIS — I48.0 PAF (PAROXYSMAL ATRIAL FIBRILLATION) (HCC): Primary | ICD-10-CM

## 2023-12-01 RX ORDER — ENALAPRIL MALEATE 20 MG/1
20 TABLET ORAL 2 TIMES DAILY
Qty: 180 TABLET | Refills: 3 | Status: SHIPPED | OUTPATIENT
Start: 2023-12-01

## 2023-12-01 NOTE — PROGRESS NOTES
[facility] CARDIOLOGY  [id number] [place] [address]  PHONE: [phone]        23        NAME:  Ivon Pan  : 1940  MRN: [id number]     Paf, ablation  Htn. Hx bph/luts/hematuria. Type 2 diabetic    CHIEF COMPLAINT:    Hypertension      SUBJECTIVE:       No chest pain or palpitation or dizziness. BP at home 160 mmHg range       Medications were all reviewed with the patient today and updated as necessary. Current Outpatient Medications   Medication Sig    enalapril (VASOTEC) 20 MG tablet Take 1 tablet by mouth 2 times daily    B-D INSULIN SYRINGE 1CC/25G 25G X 5/8\" 1 ML MISC USE TO INJECT B12 ONCE MONTHLY    cyanocobalamin 1000 MCG/ML injection INJECT 1ML INTO THE MUSCLE EVERY 30 DAYS    flecainide (TAMBOCOR) 100 MG tablet Take 1 tablet by mouth 2 times daily Take 1 tablet by mouth twice daily    rivaroxaban (XARELTO) 20 MG TABS tablet TAKE 1 TABLET BY MOUTH ONCE DAILY WITH  DINNER    finasteride (PROSCAR) 5 MG tablet Take 1 tablet by mouth daily    tamsulosin (FLOMAX) 0.4 MG capsule Take 1 capsule by mouth daily TAKE 1 CAPSULE BY MOUTH ONCE DAILY    insulin regular (NOVOLIN R RELION) 100 UNIT/ML injection Inject 16 Units into the skin 3 times daily (before meals)    insulin NPH (HUMULIN N;NOVOLIN N) 100 UNIT/ML injection vial INJECT 12 UNITS SUBCUTANEOUSLY TWICE DAILY WITH MEALS    blood glucose test strips (FREESTYLE PRECISION BRYAN TEST) strip Use to check blood sugars 3 times daily    Blood Glucose Calibration (MEDISENSE HIGH/LOW CONTROL) LIQD Use as directed    TUBERCULIN SYR 1CC/25GX5/8\" (BD SYRINGE SLIP TIP) 25G X 5/8\" 1 ML MISC Use to inject b12 once monthly     No current facility-administered medications for this visit.         No Known Allergies        PHYSICAL EXAM:     Wt Readings from Last 3 Encounters:   23 108 kg (238 lb)   23 108.9 kg (240 lb)   23 106.6 kg (235 lb)     BP Readings from Last 3 Encounters:   23 (!) 162/70

## 2023-12-06 DIAGNOSIS — Z79.4 TYPE 2 DIABETES MELLITUS WITHOUT COMPLICATION, WITH LONG-TERM CURRENT USE OF INSULIN (HCC): ICD-10-CM

## 2023-12-06 DIAGNOSIS — E11.9 TYPE 2 DIABETES MELLITUS WITHOUT COMPLICATION, WITH LONG-TERM CURRENT USE OF INSULIN (HCC): ICD-10-CM

## 2023-12-06 RX ORDER — HUMAN INSULIN 100 [IU]/ML
INJECTION, SOLUTION SUBCUTANEOUS
Qty: 20 ML | Refills: 11 | Status: SHIPPED | OUTPATIENT
Start: 2023-12-06

## 2024-01-08 DIAGNOSIS — Z79.4 TYPE 2 DIABETES MELLITUS WITHOUT COMPLICATION, WITH LONG-TERM CURRENT USE OF INSULIN (HCC): ICD-10-CM

## 2024-01-08 DIAGNOSIS — E11.9 TYPE 2 DIABETES MELLITUS WITHOUT COMPLICATION, WITH LONG-TERM CURRENT USE OF INSULIN (HCC): ICD-10-CM

## 2024-01-30 ENCOUNTER — OFFICE VISIT (OUTPATIENT)
Dept: INTERNAL MEDICINE CLINIC | Facility: CLINIC | Age: 84
End: 2024-01-30
Payer: MEDICARE

## 2024-01-30 VITALS
TEMPERATURE: 97 F | SYSTOLIC BLOOD PRESSURE: 162 MMHG | HEART RATE: 57 BPM | RESPIRATION RATE: 16 BRPM | WEIGHT: 238.6 LBS | HEIGHT: 70 IN | DIASTOLIC BLOOD PRESSURE: 60 MMHG | BODY MASS INDEX: 34.16 KG/M2

## 2024-01-30 DIAGNOSIS — I10 PRIMARY HYPERTENSION: ICD-10-CM

## 2024-01-30 DIAGNOSIS — I48.0 PAF (PAROXYSMAL ATRIAL FIBRILLATION) (HCC): ICD-10-CM

## 2024-01-30 DIAGNOSIS — E11.21 TYPE 2 DIABETES WITH NEPHROPATHY (HCC): ICD-10-CM

## 2024-01-30 DIAGNOSIS — R53.82 CHRONIC FATIGUE: ICD-10-CM

## 2024-01-30 DIAGNOSIS — D12.2 ADENOMATOUS POLYP OF ASCENDING COLON: ICD-10-CM

## 2024-01-30 DIAGNOSIS — I10 ESSENTIAL (PRIMARY) HYPERTENSION: ICD-10-CM

## 2024-01-30 DIAGNOSIS — D68.69 SECONDARY HYPERCOAGULABLE STATE (HCC): ICD-10-CM

## 2024-01-30 DIAGNOSIS — E11.22 TYPE 2 DIABETES MELLITUS WITH STAGE 3 CHRONIC KIDNEY DISEASE, WITH LONG-TERM CURRENT USE OF INSULIN, UNSPECIFIED WHETHER STAGE 3A OR 3B CKD (HCC): Primary | ICD-10-CM

## 2024-01-30 DIAGNOSIS — Z79.4 TYPE 2 DIABETES MELLITUS WITH STAGE 3 CHRONIC KIDNEY DISEASE, WITH LONG-TERM CURRENT USE OF INSULIN, UNSPECIFIED WHETHER STAGE 3A OR 3B CKD (HCC): Primary | ICD-10-CM

## 2024-01-30 DIAGNOSIS — N42.9 BENIGN PROSTATIC DISEASE: ICD-10-CM

## 2024-01-30 DIAGNOSIS — N18.30 TYPE 2 DIABETES MELLITUS WITH STAGE 3 CHRONIC KIDNEY DISEASE, WITH LONG-TERM CURRENT USE OF INSULIN, UNSPECIFIED WHETHER STAGE 3A OR 3B CKD (HCC): Primary | ICD-10-CM

## 2024-01-30 LAB
ANION GAP SERPL CALC-SCNC: 4 MMOL/L (ref 2–11)
BUN SERPL-MCNC: 26 MG/DL (ref 8–23)
CALCIUM SERPL-MCNC: 8.9 MG/DL (ref 8.3–10.4)
CHLORIDE SERPL-SCNC: 110 MMOL/L (ref 103–113)
CHOLEST SERPL-MCNC: 141 MG/DL
CO2 SERPL-SCNC: 24 MMOL/L (ref 21–32)
CREAT SERPL-MCNC: 1.4 MG/DL (ref 0.8–1.5)
CREAT UR-MCNC: 91 MG/DL
EST. AVERAGE GLUCOSE BLD GHB EST-MCNC: 189 MG/DL
GLUCOSE SERPL-MCNC: 197 MG/DL (ref 65–100)
HBA1C MFR BLD: 8.2 % (ref 4.8–5.6)
HDLC SERPL-MCNC: 41 MG/DL (ref 40–60)
HDLC SERPL: 3.4
LDLC SERPL CALC-MCNC: 70.4 MG/DL
MICROALBUMIN UR-MCNC: 16.2 MG/DL
MICROALBUMIN/CREAT UR-RTO: 178 MG/G (ref 0–30)
POTASSIUM SERPL-SCNC: 5 MMOL/L (ref 3.5–5.1)
SODIUM SERPL-SCNC: 138 MMOL/L (ref 136–146)
TRIGL SERPL-MCNC: 148 MG/DL (ref 35–150)
VLDLC SERPL CALC-MCNC: 29.6 MG/DL (ref 6–23)

## 2024-01-30 PROCEDURE — G8427 DOCREV CUR MEDS BY ELIG CLIN: HCPCS | Performed by: INTERNAL MEDICINE

## 2024-01-30 PROCEDURE — 3077F SYST BP >= 140 MM HG: CPT | Performed by: INTERNAL MEDICINE

## 2024-01-30 PROCEDURE — G8484 FLU IMMUNIZE NO ADMIN: HCPCS | Performed by: INTERNAL MEDICINE

## 2024-01-30 PROCEDURE — 99214 OFFICE O/P EST MOD 30 MIN: CPT | Performed by: INTERNAL MEDICINE

## 2024-01-30 PROCEDURE — 3078F DIAST BP <80 MM HG: CPT | Performed by: INTERNAL MEDICINE

## 2024-01-30 PROCEDURE — 1123F ACP DISCUSS/DSCN MKR DOCD: CPT | Performed by: INTERNAL MEDICINE

## 2024-01-30 PROCEDURE — 1036F TOBACCO NON-USER: CPT | Performed by: INTERNAL MEDICINE

## 2024-01-30 PROCEDURE — G8417 CALC BMI ABV UP PARAM F/U: HCPCS | Performed by: INTERNAL MEDICINE

## 2024-01-30 RX ORDER — MECLIZINE HYDROCHLORIDE 25 MG/1
25 TABLET ORAL 3 TIMES DAILY PRN
COMMUNITY

## 2024-01-30 RX ORDER — DILTIAZEM HYDROCHLORIDE 120 MG/1
120 CAPSULE, EXTENDED RELEASE ORAL DAILY
COMMUNITY

## 2024-01-30 ASSESSMENT — PATIENT HEALTH QUESTIONNAIRE - PHQ9
SUM OF ALL RESPONSES TO PHQ QUESTIONS 1-9: 0
2. FEELING DOWN, DEPRESSED OR HOPELESS: 0
1. LITTLE INTEREST OR PLEASURE IN DOING THINGS: 0
SUM OF ALL RESPONSES TO PHQ QUESTIONS 1-9: 0
SUM OF ALL RESPONSES TO PHQ9 QUESTIONS 1 & 2: 0
SUM OF ALL RESPONSES TO PHQ QUESTIONS 1-9: 0
SUM OF ALL RESPONSES TO PHQ QUESTIONS 1-9: 0

## 2024-01-30 NOTE — PROGRESS NOTES
Orders Placed This Encounter   Procedures    Hemoglobin A1C     Standing Status:   Future     Standing Expiration Date:   1/30/2025    Basic Metabolic Panel     Standing Status:   Future     Standing Expiration Date:   1/30/2025    Lipid Panel     Standing Status:   Future     Standing Expiration Date:   1/30/2025     Order Specific Question:   Is Patient Fasting?/# of Hours     Answer:   12    Microalbumin / Creatinine Urine Ratio     Standing Status:   Future     Standing Expiration Date:   1/30/2025       There are no discontinued medications.    1. Type 2 diabetes mellitus with stage 3 chronic kidney disease, with long-term current use of insulin, unspecified whether stage 3a or 3b CKD (HCC)  Will recheck labs today hopefully A1c is less than 8  - Hemoglobin A1C; Future  - Basic Metabolic Panel; Future  - Lipid Panel; Future  - Microalbumin / Creatinine Urine Ratio; Future    2. Essential (primary) hypertension  Not well-controlled will consult with cardiology to see if he still agrees with the medication change    3. Primary hypertension       4. PAF (paroxysmal atrial fibrillation) ([facility])  Controlled rate     5. Secondary hypercoagulable state (HCC) anticoagulated     6. Adenomatous polyp of ascending colon  Will evaluate previous colonoscopy3 3    7. Type 2 diabetes with nephropathy (HCC)       8. Benign prostatic disease       9. Chronic fatigue         Return in about 3 months (around 4/30/2024).         Jim Aguilar MD

## 2024-02-01 ENCOUNTER — TELEPHONE (OUTPATIENT)
Dept: INTERNAL MEDICINE CLINIC | Facility: CLINIC | Age: 84
End: 2024-02-01

## 2024-02-01 NOTE — TELEPHONE ENCOUNTER
----- Message from Jim Aguilar MD sent at 2/1/2024  8:12 AM EST -----  Please notify patient that their lab results show hemoglobin A1c is higher at 8.2.  Have him perform several 2-hour after meal glucose levels over the next 2 weeks and bring in the results cms

## 2024-02-23 DIAGNOSIS — E11.21 TYPE 2 DIABETES WITH NEPHROPATHY (HCC): ICD-10-CM

## 2024-02-23 RX ORDER — BLOOD SUGAR DIAGNOSTIC
STRIP MISCELLANEOUS
Qty: 100 EACH | Refills: 0 | Status: SHIPPED | OUTPATIENT
Start: 2024-02-23

## 2024-02-26 DIAGNOSIS — E11.21 TYPE 2 DIABETES WITH NEPHROPATHY (HCC): ICD-10-CM

## 2024-02-26 RX ORDER — BLOOD SUGAR DIAGNOSTIC
STRIP MISCELLANEOUS
Qty: 200 EACH | Refills: 3 | Status: SHIPPED | OUTPATIENT
Start: 2024-02-26

## 2024-05-24 DIAGNOSIS — N40.1 BENIGN PROSTATIC HYPERPLASIA WITH LOWER URINARY TRACT SYMPTOMS, SYMPTOM DETAILS UNSPECIFIED: ICD-10-CM

## 2024-05-28 ENCOUNTER — OFFICE VISIT (OUTPATIENT)
Dept: INTERNAL MEDICINE CLINIC | Facility: CLINIC | Age: 84
End: 2024-05-28
Payer: MEDICARE

## 2024-05-28 VITALS
DIASTOLIC BLOOD PRESSURE: 84 MMHG | TEMPERATURE: 97.4 F | OXYGEN SATURATION: 95 % | HEART RATE: 46 BPM | SYSTOLIC BLOOD PRESSURE: 132 MMHG | WEIGHT: 233.6 LBS | HEIGHT: 70 IN | BODY MASS INDEX: 33.44 KG/M2

## 2024-05-28 DIAGNOSIS — E61.1 IRON DEFICIENCY: ICD-10-CM

## 2024-05-28 DIAGNOSIS — I10 PRIMARY HYPERTENSION: ICD-10-CM

## 2024-05-28 DIAGNOSIS — R53.82 CHRONIC FATIGUE: ICD-10-CM

## 2024-05-28 DIAGNOSIS — E53.8 VITAMIN B12 DEFICIENCY: ICD-10-CM

## 2024-05-28 DIAGNOSIS — E11.21 TYPE 2 DIABETES WITH NEPHROPATHY (HCC): ICD-10-CM

## 2024-05-28 DIAGNOSIS — I48.0 PAF (PAROXYSMAL ATRIAL FIBRILLATION) (HCC): ICD-10-CM

## 2024-05-28 DIAGNOSIS — R05.8 COUGH DUE TO ACE INHIBITOR: ICD-10-CM

## 2024-05-28 DIAGNOSIS — T46.4X5A COUGH DUE TO ACE INHIBITOR: ICD-10-CM

## 2024-05-28 DIAGNOSIS — Z29.11 NEED FOR RSV IMMUNIZATION: Primary | ICD-10-CM

## 2024-05-28 LAB
ANION GAP SERPL CALC-SCNC: 10 MMOL/L (ref 9–18)
BASOPHILS # BLD: 0.1 K/UL (ref 0–0.2)
BASOPHILS NFR BLD: 1 % (ref 0–2)
BUN SERPL-MCNC: 30 MG/DL (ref 8–23)
CALCIUM SERPL-MCNC: 9.4 MG/DL (ref 8.8–10.2)
CHLORIDE SERPL-SCNC: 107 MMOL/L (ref 98–107)
CO2 SERPL-SCNC: 26 MMOL/L (ref 20–28)
CREAT SERPL-MCNC: 1.54 MG/DL (ref 0.8–1.3)
DIFFERENTIAL METHOD BLD: ABNORMAL
EOSINOPHIL # BLD: 0.1 K/UL (ref 0–0.8)
EOSINOPHIL NFR BLD: 3 % (ref 0.5–7.8)
ERYTHROCYTE [DISTWIDTH] IN BLOOD BY AUTOMATED COUNT: 13.3 % (ref 11.9–14.6)
EST. AVERAGE GLUCOSE BLD GHB EST-MCNC: 177 MG/DL
FERRITIN SERPL-MCNC: 96 NG/ML (ref 8–388)
GLUCOSE SERPL-MCNC: 162 MG/DL (ref 70–99)
HBA1C MFR BLD: 7.8 % (ref 0–5.6)
HCT VFR BLD AUTO: 38.7 % (ref 41.1–50.3)
HGB BLD-MCNC: 12.2 G/DL (ref 13.6–17.2)
IMM GRANULOCYTES # BLD AUTO: 0 K/UL (ref 0–0.5)
IMM GRANULOCYTES NFR BLD AUTO: 0 % (ref 0–5)
LYMPHOCYTES # BLD: 1.6 K/UL (ref 0.5–4.6)
LYMPHOCYTES NFR BLD: 35 % (ref 13–44)
MCH RBC QN AUTO: 31.2 PG (ref 26.1–32.9)
MCHC RBC AUTO-ENTMCNC: 31.5 G/DL (ref 31.4–35)
MCV RBC AUTO: 99 FL (ref 82–102)
MONOCYTES # BLD: 0.5 K/UL (ref 0.1–1.3)
MONOCYTES NFR BLD: 12 % (ref 4–12)
NEUTS SEG # BLD: 2.3 K/UL (ref 1.7–8.2)
NEUTS SEG NFR BLD: 49 % (ref 43–78)
NRBC # BLD: 0 K/UL (ref 0–0.2)
PLATELET # BLD AUTO: 134 K/UL (ref 150–450)
PMV BLD AUTO: 11 FL (ref 9.4–12.3)
POTASSIUM SERPL-SCNC: 4.7 MMOL/L (ref 3.5–5.1)
RBC # BLD AUTO: 3.91 M/UL (ref 4.23–5.6)
SODIUM SERPL-SCNC: 143 MMOL/L (ref 136–145)
WBC # BLD AUTO: 4.7 K/UL (ref 4.3–11.1)

## 2024-05-28 PROCEDURE — 3052F HG A1C>EQUAL 8.0%<EQUAL 9.0%: CPT | Performed by: INTERNAL MEDICINE

## 2024-05-28 PROCEDURE — 99214 OFFICE O/P EST MOD 30 MIN: CPT | Performed by: INTERNAL MEDICINE

## 2024-05-28 PROCEDURE — 3078F DIAST BP <80 MM HG: CPT | Performed by: INTERNAL MEDICINE

## 2024-05-28 PROCEDURE — 3075F SYST BP GE 130 - 139MM HG: CPT | Performed by: INTERNAL MEDICINE

## 2024-05-28 PROCEDURE — G8417 CALC BMI ABV UP PARAM F/U: HCPCS | Performed by: INTERNAL MEDICINE

## 2024-05-28 PROCEDURE — 1123F ACP DISCUSS/DSCN MKR DOCD: CPT | Performed by: INTERNAL MEDICINE

## 2024-05-28 PROCEDURE — 1036F TOBACCO NON-USER: CPT | Performed by: INTERNAL MEDICINE

## 2024-05-28 PROCEDURE — G8427 DOCREV CUR MEDS BY ELIG CLIN: HCPCS | Performed by: INTERNAL MEDICINE

## 2024-05-28 RX ORDER — TAMSULOSIN HYDROCHLORIDE 0.4 MG/1
0.4 CAPSULE ORAL DAILY
Qty: 90 CAPSULE | Refills: 0 | Status: SHIPPED | OUTPATIENT
Start: 2024-05-28

## 2024-05-28 RX ORDER — LOSARTAN POTASSIUM 100 MG/1
100 TABLET ORAL DAILY
Qty: 90 TABLET | Refills: 1 | Status: SHIPPED | OUTPATIENT
Start: 2024-05-28

## 2024-05-28 SDOH — ECONOMIC STABILITY: INCOME INSECURITY: HOW HARD IS IT FOR YOU TO PAY FOR THE VERY BASICS LIKE FOOD, HOUSING, MEDICAL CARE, AND HEATING?: SOMEWHAT HARD

## 2024-05-28 SDOH — ECONOMIC STABILITY: FOOD INSECURITY: WITHIN THE PAST 12 MONTHS, THE FOOD YOU BOUGHT JUST DIDN'T LAST AND YOU DIDN'T HAVE MONEY TO GET MORE.: NEVER TRUE

## 2024-05-28 SDOH — ECONOMIC STABILITY: FOOD INSECURITY: WITHIN THE PAST 12 MONTHS, YOU WORRIED THAT YOUR FOOD WOULD RUN OUT BEFORE YOU GOT MONEY TO BUY MORE.: NEVER TRUE

## 2024-05-28 NOTE — PROGRESS NOTES
(MEDISENSE HIGH/LOW CONTROL) LIQD Use as directed 1 each 11    TUBERCULIN SYR 1CC/25GX5/8\" (BD SYRINGE SLIP TIP) 25G X 5/8\" 1 ML MISC Use to inject b12 once monthly 3 each 3    meclizine (ANTIVERT) 25 MG tablet Take 1 tablet by mouth 3 times daily as needed for Dizziness or Nausea (Patient not taking: Reported on 5/28/2024)       No current facility-administered medications for this visit.       Orders Placed This Encounter   Procedures    CBC with Auto Differential     Standing Status:   Future     Standing Expiration Date:   5/28/2025    Basic Metabolic Panel     Standing Status:   Future     Standing Expiration Date:   5/28/2025    Hemoglobin A1C     Standing Status:   Future     Standing Expiration Date:   5/28/2025    Ferritin     Standing Status:   Future     Standing Expiration Date:   5/28/2025       Medications Discontinued During This Encounter   Medication Reason    enalapril (VASOTEC) 20 MG tablet Side effects       1. Need for RSV immunization     - respiratory syncytial vaccine, adjuvanted (AREXVY) 120 MCG/0.5ML injection; Inject 0.5 mL into the muscle for one dose.  Dispense: 0.5 mL; Refill: 0    2. Type 2 diabetes with nephropathy (HCC)  Will recheck labs he has been unable to afford basal bolus insulin in the past  - Basic Metabolic Panel; Future  - Hemoglobin A1C; Future    3. Primary hypertension  Controlled due to his cough we will change to an ARB      He will check blood pressures twice daily over the next 7 days and bring the results then goal of 130/80  - losartan (COZAAR) 100 MG tablet; Take 1 tablet by mouth daily  Dispense: 90 tablet; Refill: 1    4. PAF (paroxysmal atrial fibrillation) (HCC)  Controlled followed by cardiology    5. Chronic fatigue     - CBC with Auto Differential; Future    6. Cough due to ACE inhibitor       7. Vitamin B12 deficiency  On B12 injections     8. Iron deficiency     - Ferritin; Future      Return in about 4 months (around 9/28/2024).         Jim Kong

## 2024-06-11 ENCOUNTER — OFFICE VISIT (OUTPATIENT)
Age: 84
End: 2024-06-11
Payer: MEDICARE

## 2024-06-11 VITALS
HEART RATE: 60 BPM | HEIGHT: 70 IN | DIASTOLIC BLOOD PRESSURE: 70 MMHG | BODY MASS INDEX: 33.36 KG/M2 | SYSTOLIC BLOOD PRESSURE: 154 MMHG | WEIGHT: 233 LBS

## 2024-06-11 DIAGNOSIS — N18.31 STAGE 3A CHRONIC KIDNEY DISEASE (HCC): ICD-10-CM

## 2024-06-11 DIAGNOSIS — I10 PRIMARY HYPERTENSION: ICD-10-CM

## 2024-06-11 DIAGNOSIS — I48.0 PAF (PAROXYSMAL ATRIAL FIBRILLATION) (HCC): Primary | ICD-10-CM

## 2024-06-11 PROCEDURE — G8417 CALC BMI ABV UP PARAM F/U: HCPCS | Performed by: INTERNAL MEDICINE

## 2024-06-11 PROCEDURE — 99214 OFFICE O/P EST MOD 30 MIN: CPT | Performed by: INTERNAL MEDICINE

## 2024-06-11 PROCEDURE — 3078F DIAST BP <80 MM HG: CPT | Performed by: INTERNAL MEDICINE

## 2024-06-11 PROCEDURE — G8427 DOCREV CUR MEDS BY ELIG CLIN: HCPCS | Performed by: INTERNAL MEDICINE

## 2024-06-11 PROCEDURE — 1123F ACP DISCUSS/DSCN MKR DOCD: CPT | Performed by: INTERNAL MEDICINE

## 2024-06-11 PROCEDURE — 3077F SYST BP >= 140 MM HG: CPT | Performed by: INTERNAL MEDICINE

## 2024-06-11 PROCEDURE — 1036F TOBACCO NON-USER: CPT | Performed by: INTERNAL MEDICINE

## 2024-06-28 ENCOUNTER — OFFICE VISIT (OUTPATIENT)
Dept: UROLOGY | Age: 84
End: 2024-06-28
Payer: MEDICARE

## 2024-06-28 DIAGNOSIS — R31.0 GROSS HEMATURIA: ICD-10-CM

## 2024-06-28 DIAGNOSIS — N40.1 BENIGN PROSTATIC HYPERPLASIA WITH LOWER URINARY TRACT SYMPTOMS, SYMPTOM DETAILS UNSPECIFIED: Primary | ICD-10-CM

## 2024-06-28 LAB
BILIRUBIN, URINE, POC: NEGATIVE
BLOOD URINE, POC: NEGATIVE
GLUCOSE URINE, POC: 500
KETONES, URINE, POC: NEGATIVE
LEUKOCYTE ESTERASE, URINE, POC: NEGATIVE
NITRITE, URINE, POC: NEGATIVE
PH, URINE, POC: 5.5 (ref 4.6–8)
PROTEIN,URINE, POC: NEGATIVE
SPECIFIC GRAVITY, URINE, POC: 1.02 (ref 1–1.03)
URINALYSIS CLARITY, POC: NORMAL
URINALYSIS COLOR, POC: NORMAL
UROBILINOGEN, POC: NORMAL

## 2024-06-28 PROCEDURE — 81003 URINALYSIS AUTO W/O SCOPE: CPT | Performed by: UROLOGY

## 2024-06-28 PROCEDURE — G8417 CALC BMI ABV UP PARAM F/U: HCPCS | Performed by: UROLOGY

## 2024-06-28 PROCEDURE — G8427 DOCREV CUR MEDS BY ELIG CLIN: HCPCS | Performed by: UROLOGY

## 2024-06-28 PROCEDURE — 1036F TOBACCO NON-USER: CPT | Performed by: UROLOGY

## 2024-06-28 PROCEDURE — 99213 OFFICE O/P EST LOW 20 MIN: CPT | Performed by: UROLOGY

## 2024-06-28 PROCEDURE — 1123F ACP DISCUSS/DSCN MKR DOCD: CPT | Performed by: UROLOGY

## 2024-06-28 RX ORDER — TAMSULOSIN HYDROCHLORIDE 0.4 MG/1
0.4 CAPSULE ORAL DAILY
Qty: 90 CAPSULE | Refills: 3 | Status: SHIPPED | OUTPATIENT
Start: 2024-06-28

## 2024-06-28 RX ORDER — FINASTERIDE 5 MG/1
5 TABLET, FILM COATED ORAL DAILY
Qty: 90 TABLET | Refills: 3 | Status: SHIPPED | OUTPATIENT
Start: 2024-06-28

## 2024-06-28 ASSESSMENT — ENCOUNTER SYMPTOMS
BACK PAIN: 0
NAUSEA: 0

## 2024-06-28 NOTE — PROGRESS NOTES
No current facility-administered medications for this visit.     No Known Allergies  Social History     Socioeconomic History    Marital status:      Spouse name: Not on file    Number of children: Not on file    Years of education: Not on file    Highest education level: Not on file   Occupational History    Not on file   Tobacco Use    Smoking status: Never    Smokeless tobacco: Never   Substance and Sexual Activity    Alcohol use: No    Drug use: No    Sexual activity: Not on file   Other Topics Concern    Not on file   Social History Narrative    Not on file     Social Determinants of Health     Financial Resource Strain: Medium Risk (5/28/2024)    Overall Financial Resource Strain (CARDIA)     Difficulty of Paying Living Expenses: Somewhat hard   Food Insecurity: No Food Insecurity (5/28/2024)    Hunger Vital Sign     Worried About Running Out of Food in the Last Year: Never true     Ran Out of Food in the Last Year: Never true   Transportation Needs: Unknown (5/28/2024)    PRAPARE - Transportation     Lack of Transportation (Medical): Not on file     Lack of Transportation (Non-Medical): No   Physical Activity: Sufficiently Active (7/12/2023)    Exercise Vital Sign     Days of Exercise per Week: 7 days     Minutes of Exercise per Session: 30 min   Stress: Not on file   Social Connections: Not on file   Intimate Partner Violence: Not on file   Housing Stability: Unknown (5/28/2024)    Housing Stability Vital Sign     Unable to Pay for Housing in the Last Year: Not on file     Number of Places Lived in the Last Year: Not on file     Unstable Housing in the Last Year: No     Family History   Problem Relation Age of Onset    Heart Disease Brother     Pseudochol. Deficiency Neg Hx     Delayed Awakening Neg Hx     Post-op Nausea/Vomiting Neg Hx     Emergence Delirium Neg Hx     Post-op Cognitive Dysfunction Neg Hx     Other Neg Hx     Cancer Neg Hx     Alzheimer's Disease Mother     Malig Hypertherm Neg

## 2024-07-12 ENCOUNTER — TELEPHONE (OUTPATIENT)
Age: 84
End: 2024-07-12

## 2024-07-12 RX ORDER — FLECAINIDE ACETATE 100 MG/1
100 TABLET ORAL 2 TIMES DAILY
Qty: 180 TABLET | Refills: 0 | Status: SHIPPED | OUTPATIENT
Start: 2024-07-12

## 2024-07-12 RX ORDER — DILTIAZEM HYDROCHLORIDE 120 MG/1
120 CAPSULE, COATED, EXTENDED RELEASE ORAL DAILY
Qty: 37 CAPSULE | Refills: 0 | Status: SHIPPED | OUTPATIENT
Start: 2024-07-12

## 2024-07-12 RX ORDER — CYANOCOBALAMIN 1000 UG/ML
INJECTION, SOLUTION INTRAMUSCULAR; SUBCUTANEOUS
Qty: 3 ML | Refills: 0 | Status: SHIPPED | OUTPATIENT
Start: 2024-07-12 | End: 2024-07-12 | Stop reason: SDUPTHER

## 2024-07-12 RX ORDER — CYANOCOBALAMIN 1000 UG/ML
INJECTION, SOLUTION INTRAMUSCULAR; SUBCUTANEOUS
Qty: 3 ML | Refills: 0 | Status: SHIPPED | OUTPATIENT
Start: 2024-07-12

## 2024-07-15 RX ORDER — DILTIAZEM HYDROCHLORIDE 120 MG/1
120 CAPSULE, COATED, EXTENDED RELEASE ORAL DAILY
Qty: 90 CAPSULE | Refills: 3 | Status: SHIPPED | OUTPATIENT
Start: 2024-07-15

## 2024-07-15 RX ORDER — FLECAINIDE ACETATE 100 MG/1
100 TABLET ORAL 2 TIMES DAILY
Qty: 180 TABLET | Refills: 3 | Status: SHIPPED | OUTPATIENT
Start: 2024-07-15

## 2024-07-15 NOTE — TELEPHONE ENCOUNTER
Requested Prescriptions     Signed Prescriptions Disp Refills    dilTIAZem (CARDIZEM CD) 120 MG extended release capsule 90 capsule 3     Sig: Take 1 capsule by mouth daily     Authorizing Provider: EULOGIO DANIEL     Ordering User: LUZ EASLEY    flecainide (TAMBOCOR) 100 MG tablet 180 tablet 3     Sig: Take 1 tablet by mouth 2 times daily     Authorizing Provider: EULOGIO DANIEL     Ordering User: LUZ EASLEY

## 2024-08-20 ENCOUNTER — TELEPHONE (OUTPATIENT)
Age: 84
End: 2024-08-20

## 2024-08-20 NOTE — TELEPHONE ENCOUNTER
Pt returned call. Made him aware. Prescription was sent to his pharmacy on file on 7/12/2024. Pt stated his bottle states no refills. Advised pt to call pharmacy

## 2024-08-20 NOTE — TELEPHONE ENCOUNTER
Placed call to pt and reached voicemail. Left message making pt aware to follow up. Once returned call received will make pt aware prescription was sent to the pharmacy on 7/15/2024.

## 2024-08-20 NOTE — TELEPHONE ENCOUNTER
MEDICATION REFILL REQUEST          Name of Medication: Diltiazem   Dose:  120 mg  Frequency:  1 daily  Quantity:  30  Days' supply:  unknown          Pharmacy Name/Location:  [facility]

## 2024-09-24 ENCOUNTER — OFFICE VISIT (OUTPATIENT)
Dept: INTERNAL MEDICINE CLINIC | Facility: CLINIC | Age: 84
End: 2024-09-24
Payer: MEDICARE

## 2024-09-24 VITALS
HEIGHT: 70 IN | RESPIRATION RATE: 16 BRPM | WEIGHT: 238.2 LBS | BODY MASS INDEX: 34.1 KG/M2 | SYSTOLIC BLOOD PRESSURE: 132 MMHG | DIASTOLIC BLOOD PRESSURE: 82 MMHG | HEART RATE: 48 BPM | TEMPERATURE: 97.3 F

## 2024-09-24 DIAGNOSIS — N42.9 BENIGN PROSTATIC DISEASE: ICD-10-CM

## 2024-09-24 DIAGNOSIS — I10 ESSENTIAL (PRIMARY) HYPERTENSION: ICD-10-CM

## 2024-09-24 DIAGNOSIS — E11.65 TYPE 2 DIABETES MELLITUS WITH HYPERGLYCEMIA, WITH LONG-TERM CURRENT USE OF INSULIN (HCC): ICD-10-CM

## 2024-09-24 DIAGNOSIS — Z79.4 TYPE 2 DIABETES MELLITUS WITH HYPERGLYCEMIA, WITH LONG-TERM CURRENT USE OF INSULIN (HCC): ICD-10-CM

## 2024-09-24 DIAGNOSIS — Z23 NEEDS FLU SHOT: Primary | ICD-10-CM

## 2024-09-24 DIAGNOSIS — E11.21 TYPE 2 DIABETES WITH NEPHROPATHY (HCC): ICD-10-CM

## 2024-09-24 DIAGNOSIS — Z00.00 MEDICARE ANNUAL WELLNESS VISIT, SUBSEQUENT: ICD-10-CM

## 2024-09-24 DIAGNOSIS — E53.8 VITAMIN B12 DEFICIENCY: ICD-10-CM

## 2024-09-24 DIAGNOSIS — I48.0 PAF (PAROXYSMAL ATRIAL FIBRILLATION) (HCC): ICD-10-CM

## 2024-09-24 DIAGNOSIS — I10 PRIMARY HYPERTENSION: ICD-10-CM

## 2024-09-24 PROCEDURE — G0008 ADMIN INFLUENZA VIRUS VAC: HCPCS | Performed by: INTERNAL MEDICINE

## 2024-09-24 PROCEDURE — 3075F SYST BP GE 130 - 139MM HG: CPT | Performed by: INTERNAL MEDICINE

## 2024-09-24 PROCEDURE — 99214 OFFICE O/P EST MOD 30 MIN: CPT | Performed by: INTERNAL MEDICINE

## 2024-09-24 PROCEDURE — G0439 PPPS, SUBSEQ VISIT: HCPCS | Performed by: INTERNAL MEDICINE

## 2024-09-24 PROCEDURE — 3051F HG A1C>EQUAL 7.0%<8.0%: CPT | Performed by: INTERNAL MEDICINE

## 2024-09-24 PROCEDURE — 1123F ACP DISCUSS/DSCN MKR DOCD: CPT | Performed by: INTERNAL MEDICINE

## 2024-09-24 PROCEDURE — 90653 IIV ADJUVANT VACCINE IM: CPT | Performed by: INTERNAL MEDICINE

## 2024-09-24 PROCEDURE — G8427 DOCREV CUR MEDS BY ELIG CLIN: HCPCS | Performed by: INTERNAL MEDICINE

## 2024-09-24 PROCEDURE — 1036F TOBACCO NON-USER: CPT | Performed by: INTERNAL MEDICINE

## 2024-09-24 PROCEDURE — 3078F DIAST BP <80 MM HG: CPT | Performed by: INTERNAL MEDICINE

## 2024-09-24 PROCEDURE — G8417 CALC BMI ABV UP PARAM F/U: HCPCS | Performed by: INTERNAL MEDICINE

## 2024-09-24 RX ORDER — MECLIZINE HYDROCHLORIDE 25 MG/1
25 TABLET ORAL 3 TIMES DAILY PRN
COMMUNITY

## 2024-09-24 ASSESSMENT — PATIENT HEALTH QUESTIONNAIRE - PHQ9
SUM OF ALL RESPONSES TO PHQ9 QUESTIONS 1 & 2: 0
1. LITTLE INTEREST OR PLEASURE IN DOING THINGS: NOT AT ALL
SUM OF ALL RESPONSES TO PHQ QUESTIONS 1-9: 0
2. FEELING DOWN, DEPRESSED OR HOPELESS: NOT AT ALL
SUM OF ALL RESPONSES TO PHQ QUESTIONS 1-9: 0

## 2024-09-25 ENCOUNTER — TELEPHONE (OUTPATIENT)
Dept: INTERNAL MEDICINE CLINIC | Facility: CLINIC | Age: 84
End: 2024-09-25

## 2024-09-25 LAB
ANION GAP SERPL CALC-SCNC: 10 MMOL/L (ref 9–18)
BUN SERPL-MCNC: 20 MG/DL (ref 8–23)
CALCIUM SERPL-MCNC: 8.9 MG/DL (ref 8.8–10.2)
CHLORIDE SERPL-SCNC: 105 MMOL/L (ref 98–107)
CO2 SERPL-SCNC: 25 MMOL/L (ref 20–28)
CREAT SERPL-MCNC: 1.3 MG/DL (ref 0.8–1.3)
EST. AVERAGE GLUCOSE BLD GHB EST-MCNC: 182 MG/DL
GLUCOSE SERPL-MCNC: 149 MG/DL (ref 70–99)
HBA1C MFR BLD: 8 % (ref 0–5.6)
POTASSIUM SERPL-SCNC: 4.1 MMOL/L (ref 3.5–5.1)
SODIUM SERPL-SCNC: 139 MMOL/L (ref 136–145)

## 2024-09-25 NOTE — TELEPHONE ENCOUNTER
----- Message from Dr. Jim Aguilar MD sent at 9/25/2024  6:35 AM EDT -----  Please notify patient that their lab resultsn show hgba1c is slightly lower at 8, try to lose weight , exercise and eat low carb diet cms

## 2024-11-30 DIAGNOSIS — I10 PRIMARY HYPERTENSION: ICD-10-CM

## 2024-12-02 RX ORDER — LOSARTAN POTASSIUM 100 MG/1
100 TABLET ORAL DAILY
Qty: 90 TABLET | Refills: 3 | Status: SHIPPED | OUTPATIENT
Start: 2024-12-02

## 2024-12-05 NOTE — PROGRESS NOTES
[facility] CARDIOLOGY  [address] [place] [address], [address]  [place] [address]  PHONE: [phone]        24        NAME:  Lul Yoder  : 1940  MRN: [id number]     Paf, ablation  Htn.  Hx bph/luts/hematuria.  Type 2 diabetic  Ckd  Acei cough    CHIEF COMPLAINT:    Hypertension      SUBJECTIVE:       No chest pain or palpitation or dizziness.  BP at home 160 range     Medications were all reviewed with the patient today and updated as necessary.   Current Outpatient Medications   Medication Sig    meclizine (ANTIVERT) 25 MG tablet Take 1 tablet by mouth 3 times daily as needed for Dizziness    dilTIAZem (CARDIZEM CD) 120 MG extended release capsule Take 1 capsule by mouth daily    flecainide (TAMBOCOR) 100 MG tablet Take 1 tablet by mouth 2 times daily    cyanocobalamin 1000 MCG/ML injection INJECT 1 ML INTO THE MUSCLE EVERY 30 DAYS    finasteride (PROSCAR) 5 MG tablet Take 1 tablet by mouth daily    tamsulosin (FLOMAX) 0.4 MG capsule Take 1 capsule by mouth daily    rivaroxaban (XARELTO) 15 MG TABS tablet Take 1 tablet by mouth daily (with breakfast)    blood glucose test strips (ONETOUCH ULTRA) strip USE  STRIP TO CHECK GLUCOSE TWICE DAILY Dx E11.9    insulin NPH (NOVOLIN N RELION) 100 UNIT/ML injection vial INJECT 12 UNITS SUBCUTANEOUSLY TWICE DAILY WITH MEALS    NOVOLIN R RELION 100 UNIT/ML injection INJECT 16 UNITS SUBCUTANEOUSLY THREE TIMES DAILY BEFORE MEAL(S) (Patient taking differently: 12 Units 3 times daily (before meals))    B-D INSULIN SYRINGE 1CC/25G 25G X 5/8\" 1 ML MISC USE TO INJECT B12 ONCE MONTHLY    Blood Glucose Calibration (MEDISENSE HIGH/LOW CONTROL) LIQD Use as directed    TUBERCULIN SYR 1CC/25GX5/8\" (BD SYRINGE SLIP TIP) 25G X 5/8\" 1 ML MISC Use to inject b12 once monthly     No current facility-administered medications for this visit.        No Known Allergies        PHYSICAL EXAM:     Wt Readings from Last 3 Encounters:   24 108 kg (238 lb)   24 108 kg

## 2024-12-09 ENCOUNTER — OFFICE VISIT (OUTPATIENT)
Age: 84
End: 2024-12-09
Payer: MEDICARE

## 2024-12-09 VITALS
BODY MASS INDEX: 34.07 KG/M2 | SYSTOLIC BLOOD PRESSURE: 172 MMHG | WEIGHT: 238 LBS | HEART RATE: 65 BPM | DIASTOLIC BLOOD PRESSURE: 65 MMHG | HEIGHT: 70 IN

## 2024-12-09 DIAGNOSIS — I48.0 PAF (PAROXYSMAL ATRIAL FIBRILLATION) (HCC): Primary | ICD-10-CM

## 2024-12-09 DIAGNOSIS — I10 PRIMARY HYPERTENSION: ICD-10-CM

## 2024-12-09 PROCEDURE — 1036F TOBACCO NON-USER: CPT | Performed by: INTERNAL MEDICINE

## 2024-12-09 PROCEDURE — 3078F DIAST BP <80 MM HG: CPT | Performed by: INTERNAL MEDICINE

## 2024-12-09 PROCEDURE — G8427 DOCREV CUR MEDS BY ELIG CLIN: HCPCS | Performed by: INTERNAL MEDICINE

## 2024-12-09 PROCEDURE — 93000 ELECTROCARDIOGRAM COMPLETE: CPT | Performed by: INTERNAL MEDICINE

## 2024-12-09 PROCEDURE — 1123F ACP DISCUSS/DSCN MKR DOCD: CPT | Performed by: INTERNAL MEDICINE

## 2024-12-09 PROCEDURE — 99214 OFFICE O/P EST MOD 30 MIN: CPT | Performed by: INTERNAL MEDICINE

## 2024-12-09 PROCEDURE — 3077F SYST BP >= 140 MM HG: CPT | Performed by: INTERNAL MEDICINE

## 2024-12-09 PROCEDURE — G8482 FLU IMMUNIZE ORDER/ADMIN: HCPCS | Performed by: INTERNAL MEDICINE

## 2024-12-09 PROCEDURE — 1159F MED LIST DOCD IN RCRD: CPT | Performed by: INTERNAL MEDICINE

## 2024-12-09 PROCEDURE — G8417 CALC BMI ABV UP PARAM F/U: HCPCS | Performed by: INTERNAL MEDICINE

## 2024-12-09 PROCEDURE — 1126F AMNT PAIN NOTED NONE PRSNT: CPT | Performed by: INTERNAL MEDICINE

## 2024-12-09 RX ORDER — DILTIAZEM HYDROCHLORIDE 120 MG/1
120 CAPSULE, COATED, EXTENDED RELEASE ORAL 2 TIMES DAILY
Qty: 180 CAPSULE | Refills: 3 | Status: SHIPPED | OUTPATIENT
Start: 2024-12-09

## 2024-12-09 RX ORDER — VALSARTAN 160 MG/1
160 TABLET ORAL DAILY
Qty: 180 TABLET | Refills: 1 | Status: SHIPPED | OUTPATIENT
Start: 2024-12-09

## 2024-12-09 RX ORDER — FLECAINIDE ACETATE 100 MG/1
100 TABLET ORAL 2 TIMES DAILY
Qty: 180 TABLET | Refills: 3 | Status: SHIPPED | OUTPATIENT
Start: 2024-12-09

## 2024-12-09 RX ORDER — CHLORTHALIDONE 25 MG/1
25 TABLET ORAL DAILY
Qty: 30 TABLET | Refills: 3 | Status: SHIPPED | OUTPATIENT
Start: 2024-12-09

## 2024-12-24 DIAGNOSIS — E11.9 TYPE 2 DIABETES MELLITUS WITHOUT COMPLICATION, WITH LONG-TERM CURRENT USE OF INSULIN (HCC): ICD-10-CM

## 2024-12-24 DIAGNOSIS — Z79.4 TYPE 2 DIABETES MELLITUS WITHOUT COMPLICATION, WITH LONG-TERM CURRENT USE OF INSULIN (HCC): ICD-10-CM

## 2024-12-24 RX ORDER — HUMAN INSULIN 100 [IU]/ML
INJECTION, SOLUTION SUBCUTANEOUS
Qty: 10 ML | Refills: 0 | Status: SHIPPED | OUTPATIENT
Start: 2024-12-24

## 2025-01-03 DIAGNOSIS — E11.21 TYPE 2 DIABETES WITH NEPHROPATHY (HCC): Primary | ICD-10-CM

## 2025-01-03 DIAGNOSIS — E11.21 TYPE 2 DIABETES WITH NEPHROPATHY (HCC): ICD-10-CM

## 2025-01-03 DIAGNOSIS — I10 PRIMARY HYPERTENSION: ICD-10-CM

## 2025-01-03 LAB
ALT SERPL-CCNC: 17 U/L (ref 8–55)
ANION GAP SERPL CALC-SCNC: 9 MMOL/L (ref 7–16)
BUN SERPL-MCNC: 28 MG/DL (ref 8–23)
CALCIUM SERPL-MCNC: 9 MG/DL (ref 8.8–10.2)
CHLORIDE SERPL-SCNC: 104 MMOL/L (ref 98–107)
CHOLEST SERPL-MCNC: 146 MG/DL (ref 0–200)
CO2 SERPL-SCNC: 25 MMOL/L (ref 20–29)
CREAT SERPL-MCNC: 1.36 MG/DL (ref 0.8–1.3)
EST. AVERAGE GLUCOSE BLD GHB EST-MCNC: 167 MG/DL
GLUCOSE SERPL-MCNC: 171 MG/DL (ref 70–99)
HBA1C MFR BLD: 7.4 % (ref 0–5.6)
HDLC SERPL-MCNC: 33 MG/DL (ref 40–60)
HDLC SERPL: 4.4 (ref 0–5)
LDLC SERPL CALC-MCNC: 72 MG/DL (ref 0–100)
MAGNESIUM SERPL-MCNC: 2.1 MG/DL (ref 1.8–2.4)
POTASSIUM SERPL-SCNC: 4.2 MMOL/L (ref 3.5–5.1)
SODIUM SERPL-SCNC: 139 MMOL/L (ref 136–145)
TRIGL SERPL-MCNC: 205 MG/DL (ref 0–150)
VLDLC SERPL CALC-MCNC: 41 MG/DL (ref 6–23)

## 2025-02-02 NOTE — PROGRESS NOTES
[facility] CARDIOLOGY  [address] [place] [address], [address]  [place] [address]  PHONE: [phone]        25        NAME:  Lul Yoder  : 1940  MRN: [id number]     Paf, ablation  Htn.  Hx bph/luts/hematuria.  Type 2 diabetic  Ckd  Acei cough    CHIEF COMPLAINT:    Hypertension      SUBJECTIVE:     BP at home still 160 mmHg range.  No chest pain or palpitation or dizziness.     Medications were all reviewed with the patient today and updated as necessary.   Current Outpatient Medications   Medication Sig    valsartan (DIOVAN) 320 MG tablet Take 1 tablet by mouth daily    NOVOLIN R RELION 100 UNIT/ML injection INJECT 16 UNITS SUBCUTANEOUSLY THREE TIMES DAILY BEFORE MEAL(S)    dilTIAZem (CARDIZEM CD) 120 MG extended release capsule Take 1 capsule by mouth in the morning and at bedtime    rivaroxaban (XARELTO) 15 MG TABS tablet Take 1 tablet by mouth daily (with breakfast)    flecainide (TAMBOCOR) 100 MG tablet Take 1 tablet by mouth 2 times daily    chlorthalidone (HYGROTON) 25 MG tablet Take 1 tablet by mouth daily    meclizine (ANTIVERT) 25 MG tablet Take 1 tablet by mouth 3 times daily as needed for Dizziness    cyanocobalamin 1000 MCG/ML injection INJECT 1 ML INTO THE MUSCLE EVERY 30 DAYS    finasteride (PROSCAR) 5 MG tablet Take 1 tablet by mouth daily    tamsulosin (FLOMAX) 0.4 MG capsule Take 1 capsule by mouth daily    insulin NPH (NOVOLIN N RELION) 100 UNIT/ML injection vial INJECT 12 UNITS SUBCUTANEOUSLY TWICE DAILY WITH MEALS    blood glucose test strips (ONETOUCH ULTRA) strip USE  STRIP TO CHECK GLUCOSE TWICE DAILY Dx E11.9 (Patient not taking: Reported on 2/3/2025)    B-D INSULIN SYRINGE 1CC/25G 25G X 5/8\" 1 ML MISC USE TO INJECT B12 ONCE MONTHLY (Patient not taking: Reported on 2/3/2025)    Blood Glucose Calibration (MEDISENSE HIGH/LOW CONTROL) LIQD Use as directed (Patient not taking: Reported on 2/3/2025)    TUBERCULIN SYR 1CC/25GX5/8\" (BD SYRINGE SLIP TIP) 25G X 5/8\" 1 ML MISC

## 2025-02-03 ENCOUNTER — OFFICE VISIT (OUTPATIENT)
Age: 85
End: 2025-02-03
Payer: MEDICARE

## 2025-02-03 VITALS
SYSTOLIC BLOOD PRESSURE: 138 MMHG | BODY MASS INDEX: 33.79 KG/M2 | DIASTOLIC BLOOD PRESSURE: 62 MMHG | HEART RATE: 48 BPM | WEIGHT: 236 LBS | HEIGHT: 70 IN

## 2025-02-03 DIAGNOSIS — I48.0 PAF (PAROXYSMAL ATRIAL FIBRILLATION) (HCC): Primary | ICD-10-CM

## 2025-02-03 DIAGNOSIS — I10 PRIMARY HYPERTENSION: ICD-10-CM

## 2025-02-03 PROCEDURE — 99214 OFFICE O/P EST MOD 30 MIN: CPT | Performed by: INTERNAL MEDICINE

## 2025-02-03 PROCEDURE — 1123F ACP DISCUSS/DSCN MKR DOCD: CPT | Performed by: INTERNAL MEDICINE

## 2025-02-03 PROCEDURE — 1126F AMNT PAIN NOTED NONE PRSNT: CPT | Performed by: INTERNAL MEDICINE

## 2025-02-03 PROCEDURE — 3075F SYST BP GE 130 - 139MM HG: CPT | Performed by: INTERNAL MEDICINE

## 2025-02-03 PROCEDURE — 3078F DIAST BP <80 MM HG: CPT | Performed by: INTERNAL MEDICINE

## 2025-02-03 PROCEDURE — 1036F TOBACCO NON-USER: CPT | Performed by: INTERNAL MEDICINE

## 2025-02-03 PROCEDURE — G8417 CALC BMI ABV UP PARAM F/U: HCPCS | Performed by: INTERNAL MEDICINE

## 2025-02-03 PROCEDURE — G8428 CUR MEDS NOT DOCUMENT: HCPCS | Performed by: INTERNAL MEDICINE

## 2025-02-03 RX ORDER — VALSARTAN 320 MG/1
320 TABLET ORAL DAILY
Qty: 90 TABLET | Refills: 3 | Status: SHIPPED | OUTPATIENT
Start: 2025-02-03

## 2025-02-04 DIAGNOSIS — Z79.4 TYPE 2 DIABETES MELLITUS WITHOUT COMPLICATION, WITH LONG-TERM CURRENT USE OF INSULIN (HCC): ICD-10-CM

## 2025-02-04 DIAGNOSIS — E11.9 TYPE 2 DIABETES MELLITUS WITHOUT COMPLICATION, WITH LONG-TERM CURRENT USE OF INSULIN (HCC): ICD-10-CM

## 2025-02-04 RX ORDER — CYANOCOBALAMIN 1000 UG/ML
INJECTION, SOLUTION INTRAMUSCULAR; SUBCUTANEOUS
Qty: 3 ML | Refills: 3 | Status: SHIPPED | OUTPATIENT
Start: 2025-02-04

## 2025-02-04 RX ORDER — HUMAN INSULIN 100 [IU]/ML
INJECTION, SOLUTION SUBCUTANEOUS
Qty: 30 ML | Refills: 3 | Status: SHIPPED | OUTPATIENT
Start: 2025-02-04

## 2025-03-04 ENCOUNTER — OFFICE VISIT (OUTPATIENT)
Dept: INTERNAL MEDICINE CLINIC | Facility: CLINIC | Age: 85
End: 2025-03-04
Payer: MEDICARE

## 2025-03-04 VITALS
DIASTOLIC BLOOD PRESSURE: 54 MMHG | HEART RATE: 55 BPM | HEIGHT: 70 IN | OXYGEN SATURATION: 94 % | BODY MASS INDEX: 33.13 KG/M2 | RESPIRATION RATE: 16 BRPM | TEMPERATURE: 97.2 F | SYSTOLIC BLOOD PRESSURE: 146 MMHG | WEIGHT: 231.4 LBS

## 2025-03-04 DIAGNOSIS — M54.2 NECK PAIN ON LEFT SIDE: Primary | ICD-10-CM

## 2025-03-04 PROCEDURE — 1160F RVW MEDS BY RX/DR IN RCRD: CPT | Performed by: NURSE PRACTITIONER

## 2025-03-04 PROCEDURE — 1159F MED LIST DOCD IN RCRD: CPT | Performed by: NURSE PRACTITIONER

## 2025-03-04 PROCEDURE — G8417 CALC BMI ABV UP PARAM F/U: HCPCS | Performed by: NURSE PRACTITIONER

## 2025-03-04 PROCEDURE — 3078F DIAST BP <80 MM HG: CPT | Performed by: NURSE PRACTITIONER

## 2025-03-04 PROCEDURE — 3077F SYST BP >= 140 MM HG: CPT | Performed by: NURSE PRACTITIONER

## 2025-03-04 PROCEDURE — 99214 OFFICE O/P EST MOD 30 MIN: CPT | Performed by: NURSE PRACTITIONER

## 2025-03-04 PROCEDURE — G8427 DOCREV CUR MEDS BY ELIG CLIN: HCPCS | Performed by: NURSE PRACTITIONER

## 2025-03-04 PROCEDURE — 1123F ACP DISCUSS/DSCN MKR DOCD: CPT | Performed by: NURSE PRACTITIONER

## 2025-03-04 PROCEDURE — 1036F TOBACCO NON-USER: CPT | Performed by: NURSE PRACTITIONER

## 2025-03-04 RX ORDER — BACLOFEN 10 MG/1
5 TABLET ORAL NIGHTLY PRN
Qty: 15 TABLET | Refills: 0 | Status: SHIPPED | OUTPATIENT
Start: 2025-03-04

## 2025-03-04 RX ORDER — CYCLOBENZAPRINE HCL 10 MG
10 TABLET ORAL 3 TIMES DAILY PRN
Qty: 30 TABLET | Refills: 0 | Status: SHIPPED | OUTPATIENT
Start: 2025-03-04 | End: 2025-03-14

## 2025-03-04 SDOH — ECONOMIC STABILITY: FOOD INSECURITY: WITHIN THE PAST 12 MONTHS, YOU WORRIED THAT YOUR FOOD WOULD RUN OUT BEFORE YOU GOT MONEY TO BUY MORE.: SOMETIMES TRUE

## 2025-03-04 SDOH — ECONOMIC STABILITY: FOOD INSECURITY: WITHIN THE PAST 12 MONTHS, THE FOOD YOU BOUGHT JUST DIDN'T LAST AND YOU DIDN'T HAVE MONEY TO GET MORE.: NEVER TRUE

## 2025-03-04 ASSESSMENT — ENCOUNTER SYMPTOMS
SHORTNESS OF BREATH: 0
ABDOMINAL PAIN: 0
COUGH: 0

## 2025-03-04 ASSESSMENT — PATIENT HEALTH QUESTIONNAIRE - PHQ9
2. FEELING DOWN, DEPRESSED OR HOPELESS: NOT AT ALL
SUM OF ALL RESPONSES TO PHQ QUESTIONS 1-9: 1
1. LITTLE INTEREST OR PLEASURE IN DOING THINGS: SEVERAL DAYS

## 2025-03-04 NOTE — PROGRESS NOTES
SUBCUTANEOUSLY THREE TIMES DAILY BEFORE MEAL(S) 30 mL 3    valsartan (DIOVAN) 320 MG tablet Take 1 tablet by mouth daily 90 tablet 3    dilTIAZem (CARDIZEM CD) 120 MG extended release capsule Take 1 capsule by mouth in the morning and at bedtime 180 capsule 3    rivaroxaban (XARELTO) 15 MG TABS tablet Take 1 tablet by mouth daily (with breakfast) 90 tablet 3    flecainide (TAMBOCOR) 100 MG tablet Take 1 tablet by mouth 2 times daily 180 tablet 3    chlorthalidone (HYGROTON) 25 MG tablet Take 1 tablet by mouth daily 30 tablet 3    finasteride (PROSCAR) 5 MG tablet Take 1 tablet by mouth daily 90 tablet 3    tamsulosin (FLOMAX) 0.4 MG capsule Take 1 capsule by mouth daily 90 capsule 3    blood glucose test strips (ONETOUCH ULTRA) strip USE  STRIP TO CHECK GLUCOSE TWICE DAILY Dx E11.9 200 each 3    B-D INSULIN SYRINGE 1CC/25G 25G X 5/8\" 1 ML MISC USE TO INJECT B12 ONCE MONTHLY 3 each 3    TUBERCULIN SYR 1CC/25GX5/8\" (BD SYRINGE SLIP TIP) 25G X 5/8\" 1 ML MISC Use to inject b12 once monthly 3 each 3    meclizine (ANTIVERT) 25 MG tablet Take 1 tablet by mouth 3 times daily as needed for Dizziness (Patient not taking: Reported on 3/4/2025)      Blood Glucose Calibration (MEDISENSE HIGH/LOW CONTROL) LIQD Use as directed (Patient not taking: Reported on 2/3/2025) 1 each 11     No current facility-administered medications for this visit.        Past Medical History:   Diagnosis Date    Anemia     Arthritis     Atrial fibrillation (HCC)     Predominantly atrial flutter; flecainide 2014; atrial flutter ablation 2/11/16; unclear extent of a fib    Calculus of kidney     Conjunctivitis unspecified     Diabetes mellitus type II, uncontrolled     BS run between(100-170). Low BS s/sx:weak, shakes, =bs below 60.    Disorders of bursae and tendons in shoulder region, unspecified     Hypertension     on medication    Ingrowing nail     Lumbago     Obesity     Osteoarthrosis, unspecified whether generalized or localized, unspecified

## 2025-04-16 RX ORDER — CHLORTHALIDONE 25 MG/1
25 TABLET ORAL DAILY
Qty: 90 TABLET | Refills: 1 | Status: SHIPPED | OUTPATIENT
Start: 2025-04-16

## 2025-04-16 NOTE — TELEPHONE ENCOUNTER
Requested Prescriptions     Pending Prescriptions Disp Refills    chlorthalidone (HYGROTON) 25 MG tablet [Pharmacy Med Name: Chlorthalidone 25 MG Oral Tablet] 90 tablet 1     Sig: Take 1 tablet by mouth once daily     Verified rx. Last OV 2/3/25. Erx to pharm on file.

## 2025-04-16 NOTE — TELEPHONE ENCOUNTER
MEDICATION REFILL REQUEST          Name of Medication:  chlorthalidone (HYGROTON) 25 MG tablet   Dose:  25mg  Frequency:    Quantity:    Days' supply            Pharmacy Name/Location:    [id number] - [place] - [address] -  [phone] - F [phone] [address] [place] [address]  Phone: [phone]  Fax: [phone]

## 2025-05-20 ENCOUNTER — OFFICE VISIT (OUTPATIENT)
Dept: INTERNAL MEDICINE CLINIC | Facility: CLINIC | Age: 85
End: 2025-05-20
Payer: MEDICARE

## 2025-05-20 VITALS
HEART RATE: 43 BPM | HEIGHT: 70 IN | TEMPERATURE: 97.2 F | WEIGHT: 226.4 LBS | BODY MASS INDEX: 32.41 KG/M2 | DIASTOLIC BLOOD PRESSURE: 52 MMHG | SYSTOLIC BLOOD PRESSURE: 136 MMHG | RESPIRATION RATE: 16 BRPM

## 2025-05-20 DIAGNOSIS — R53.82 CHRONIC FATIGUE: ICD-10-CM

## 2025-05-20 DIAGNOSIS — E11.9 TYPE 2 DIABETES MELLITUS WITHOUT COMPLICATION, WITH LONG-TERM CURRENT USE OF INSULIN (HCC): ICD-10-CM

## 2025-05-20 DIAGNOSIS — E53.8 VITAMIN B12 DEFICIENCY: ICD-10-CM

## 2025-05-20 DIAGNOSIS — I10 PRIMARY HYPERTENSION: ICD-10-CM

## 2025-05-20 DIAGNOSIS — D68.69 SECONDARY HYPERCOAGULABLE STATE: ICD-10-CM

## 2025-05-20 DIAGNOSIS — N42.9 BENIGN PROSTATIC DISEASE: ICD-10-CM

## 2025-05-20 DIAGNOSIS — Z79.4 TYPE 2 DIABETES MELLITUS WITHOUT COMPLICATION, WITH LONG-TERM CURRENT USE OF INSULIN (HCC): ICD-10-CM

## 2025-05-20 DIAGNOSIS — E11.21 TYPE 2 DIABETES WITH NEPHROPATHY (HCC): ICD-10-CM

## 2025-05-20 DIAGNOSIS — D12.2 ADENOMATOUS POLYP OF ASCENDING COLON: ICD-10-CM

## 2025-05-20 DIAGNOSIS — I48.0 PAF (PAROXYSMAL ATRIAL FIBRILLATION) (HCC): ICD-10-CM

## 2025-05-20 DIAGNOSIS — Z79.4 TYPE 2 DIABETES MELLITUS WITHOUT COMPLICATION, WITH LONG-TERM CURRENT USE OF INSULIN (HCC): Primary | ICD-10-CM

## 2025-05-20 DIAGNOSIS — E11.9 TYPE 2 DIABETES MELLITUS WITHOUT COMPLICATION, WITH LONG-TERM CURRENT USE OF INSULIN (HCC): Primary | ICD-10-CM

## 2025-05-20 DIAGNOSIS — N18.31 STAGE 3A CHRONIC KIDNEY DISEASE (HCC): ICD-10-CM

## 2025-05-20 LAB
ANION GAP SERPL CALC-SCNC: 12 MMOL/L (ref 7–16)
BUN SERPL-MCNC: 40 MG/DL (ref 8–23)
CALCIUM SERPL-MCNC: 9.1 MG/DL (ref 8.8–10.2)
CHLORIDE SERPL-SCNC: 106 MMOL/L (ref 98–107)
CO2 SERPL-SCNC: 22 MMOL/L (ref 20–29)
CREAT SERPL-MCNC: 1.56 MG/DL (ref 0.8–1.3)
CREAT UR-MCNC: 192 MG/DL (ref 39–259)
EST. AVERAGE GLUCOSE BLD GHB EST-MCNC: 174 MG/DL
GLUCOSE SERPL-MCNC: 167 MG/DL (ref 70–99)
HBA1C MFR BLD: 7.7 % (ref 0–5.6)
MICROALBUMIN UR-MCNC: 4.67 MG/DL (ref 0–20)
MICROALBUMIN/CREAT UR-RTO: 24 MG/G (ref 0–30)
POTASSIUM SERPL-SCNC: 4.4 MMOL/L (ref 3.5–5.1)
SODIUM SERPL-SCNC: 140 MMOL/L (ref 136–145)

## 2025-05-20 PROCEDURE — 3075F SYST BP GE 130 - 139MM HG: CPT | Performed by: INTERNAL MEDICINE

## 2025-05-20 PROCEDURE — 3078F DIAST BP <80 MM HG: CPT | Performed by: INTERNAL MEDICINE

## 2025-05-20 PROCEDURE — 1123F ACP DISCUSS/DSCN MKR DOCD: CPT | Performed by: INTERNAL MEDICINE

## 2025-05-20 PROCEDURE — G2211 COMPLEX E/M VISIT ADD ON: HCPCS | Performed by: INTERNAL MEDICINE

## 2025-05-20 PROCEDURE — 3051F HG A1C>EQUAL 7.0%<8.0%: CPT | Performed by: INTERNAL MEDICINE

## 2025-05-20 PROCEDURE — 99214 OFFICE O/P EST MOD 30 MIN: CPT | Performed by: INTERNAL MEDICINE

## 2025-05-20 PROCEDURE — 1125F AMNT PAIN NOTED PAIN PRSNT: CPT | Performed by: INTERNAL MEDICINE

## 2025-05-20 PROCEDURE — 1159F MED LIST DOCD IN RCRD: CPT | Performed by: INTERNAL MEDICINE

## 2025-05-20 PROCEDURE — 1160F RVW MEDS BY RX/DR IN RCRD: CPT | Performed by: INTERNAL MEDICINE

## 2025-05-20 PROCEDURE — G8427 DOCREV CUR MEDS BY ELIG CLIN: HCPCS | Performed by: INTERNAL MEDICINE

## 2025-05-20 PROCEDURE — 1036F TOBACCO NON-USER: CPT | Performed by: INTERNAL MEDICINE

## 2025-05-20 PROCEDURE — G8417 CALC BMI ABV UP PARAM F/U: HCPCS | Performed by: INTERNAL MEDICINE

## 2025-05-20 RX ORDER — MECLIZINE HYDROCHLORIDE 25 MG/1
25 TABLET ORAL 3 TIMES DAILY PRN
Qty: 30 TABLET | Refills: 4 | Status: SHIPPED | OUTPATIENT
Start: 2025-05-20 | End: 2025-07-09

## 2025-05-20 NOTE — PROGRESS NOTES
(CARDIZEM CD) 120 MG extended release capsule Take 1 capsule by mouth in the morning and at bedtime 180 capsule 3    rivaroxaban (XARELTO) 15 MG TABS tablet Take 1 tablet by mouth daily (with breakfast) 90 tablet 3    flecainide (TAMBOCOR) 100 MG tablet Take 1 tablet by mouth 2 times daily 180 tablet 3    meclizine (ANTIVERT) 25 MG tablet Take 1 tablet by mouth 3 times daily as needed for Dizziness      finasteride (PROSCAR) 5 MG tablet Take 1 tablet by mouth daily 90 tablet 3    tamsulosin (FLOMAX) 0.4 MG capsule Take 1 capsule by mouth daily 90 capsule 3    blood glucose test strips (ONETOUCH ULTRA) strip USE  STRIP TO CHECK GLUCOSE TWICE DAILY Dx E11.9 200 each 3    Blood Glucose Calibration (MEDISENSE HIGH/LOW CONTROL) LIQD Use as directed 1 each 11    TUBERCULIN SYR 1CC/25GX5/8\" (BD SYRINGE SLIP TIP) 25G X 5/8\" 1 ML MISC Use to inject b12 once monthly 3 each 3    cyanocobalamin 1000 MCG/ML injection INJECT 1 ML (CC) INTRAMUSCULARLY ONCE EVERY MONTH (30  DAYS). (Patient not taking: Reported on 5/20/2025) 3 mL 3     No current facility-administered medications for this visit.     Health Maintenance   Topic Date Due    DTaP/Tdap/Td vaccine (1 - Tdap) Never done    Colorectal Cancer Screen  04/23/2022    Diabetic Alb to Cr ratio (uACR) test  01/30/2025    COVID-19 Vaccine (6 - 2024-25 season) 03/04/2026 (Originally 9/1/2024)    Annual Wellness Visit (Medicare)  09/25/2025    GFR test (Diabetes, CKD 3-4, OR last GFR 15-59)  01/03/2026    Depression Screen  03/04/2026    Flu vaccine  Completed    Shingles vaccine  Completed    Pneumococcal 50+ years Vaccine  Completed    Respiratory Syncytial Virus (RSV) Pregnant or age 60 yrs+  Completed    Hepatitis A vaccine  Aged Out    Hepatitis B vaccine  Aged Out    Hib vaccine  Aged Out    Polio vaccine  Aged Out    Meningococcal (ACWY) vaccine  Aged Out    Meningococcal B vaccine  Aged Out     Family History   Problem Relation Age of Onset    Heart Disease Brother

## 2025-05-21 ENCOUNTER — RESULTS FOLLOW-UP (OUTPATIENT)
Dept: INTERNAL MEDICINE CLINIC | Facility: CLINIC | Age: 85
End: 2025-05-21

## 2025-05-22 NOTE — TELEPHONE ENCOUNTER
----- Message from Dr. Jim Aguilar MD sent at 5/21/2025  8:17 AM EDT -----  Please notify patient that their lab results are normal.

## 2025-06-27 ENCOUNTER — TELEPHONE (OUTPATIENT)
Dept: UROLOGY | Age: 85
End: 2025-06-27

## 2025-06-27 ENCOUNTER — OFFICE VISIT (OUTPATIENT)
Dept: UROLOGY | Age: 85
End: 2025-06-27
Payer: MEDICARE

## 2025-06-27 DIAGNOSIS — N40.1 BENIGN PROSTATIC HYPERPLASIA WITH LOWER URINARY TRACT SYMPTOMS, SYMPTOM DETAILS UNSPECIFIED: Primary | ICD-10-CM

## 2025-06-27 LAB
BILIRUBIN, URINE, POC: NEGATIVE
BLOOD URINE, POC: NEGATIVE
GLUCOSE URINE, POC: 250 MG/DL
KETONES, URINE, POC: NEGATIVE MG/DL
LEUKOCYTE ESTERASE, URINE, POC: NEGATIVE
NITRITE, URINE, POC: NEGATIVE
PH, URINE, POC: 6 (ref 4.6–8)
PROTEIN,URINE, POC: NEGATIVE MG/DL
SPECIFIC GRAVITY, URINE, POC: 1.02 (ref 1–1.03)
URINALYSIS CLARITY, POC: NORMAL
URINALYSIS COLOR, POC: NORMAL
UROBILINOGEN, POC: NORMAL MG/DL

## 2025-06-27 PROCEDURE — G8428 CUR MEDS NOT DOCUMENT: HCPCS | Performed by: UROLOGY

## 2025-06-27 PROCEDURE — 81003 URINALYSIS AUTO W/O SCOPE: CPT | Performed by: UROLOGY

## 2025-06-27 PROCEDURE — 1036F TOBACCO NON-USER: CPT | Performed by: UROLOGY

## 2025-06-27 PROCEDURE — G8417 CALC BMI ABV UP PARAM F/U: HCPCS | Performed by: UROLOGY

## 2025-06-27 PROCEDURE — 99213 OFFICE O/P EST LOW 20 MIN: CPT | Performed by: UROLOGY

## 2025-06-27 PROCEDURE — 1123F ACP DISCUSS/DSCN MKR DOCD: CPT | Performed by: UROLOGY

## 2025-06-27 RX ORDER — FINASTERIDE 5 MG/1
5 TABLET, FILM COATED ORAL DAILY
Qty: 90 TABLET | Refills: 3 | Status: SHIPPED | OUTPATIENT
Start: 2025-06-27

## 2025-06-27 RX ORDER — TAMSULOSIN HYDROCHLORIDE 0.4 MG/1
0.4 CAPSULE ORAL DAILY
Qty: 90 CAPSULE | Refills: 3 | Status: SHIPPED | OUTPATIENT
Start: 2025-06-27

## 2025-06-27 NOTE — TELEPHONE ENCOUNTER
Verifying pt demographics. Pt stated the number listed was his number. Pt states it is no longer that. Pt states he changed his number and refused to give the updated one. Let pt know that whatever number was listed in his chart would be the one we would call if we needed to discuss anything with him. Pt still refused.

## 2025-06-28 ASSESSMENT — ENCOUNTER SYMPTOMS
EYE DISCHARGE: 0
INDIGESTION: 0
BACK PAIN: 0
BLOOD IN STOOL: 0
DIARRHEA: 0
COUGH: 0
HEARTBURN: 0
SKIN LESIONS: 0
SHORTNESS OF BREATH: 0
WHEEZING: 0
EYE PAIN: 0
ABDOMINAL PAIN: 0
VOMITING: 0
NAUSEA: 0
CONSTIPATION: 0

## 2025-06-28 NOTE — PROGRESS NOTES
[facility]  [address] [place] [address]   [address]  [place] [address]  [phone]    Lul Yoder  : 1940    Chief Complaint   Patient presents with    Follow-up    Benign Prostatic Hypertrophy          HPI     Lul Yoder is a 85 y.o. male    History of Present Illness  The patient is an 85-year-old male with a history of benign prostatic hyperplasia (BPH) and gross hematuria, presenting for a follow-up visit. He underwent a hematuria workup in  and  and is a candidate for transurethral resection of the prostate (TURP). He is currently on Flomax and finasteride, which he tolerates well, and reports getting up only once per night. His urine is negative for blood, nitrites, or leukocytes.     He reports no new instances of blood in his urine and has not experienced any bladder infections. He recently had his medications refilled, providing him with a 90-day supply.    Lab Results   Component Value Date    PSA 0.2 2023    PSA 0.4 2022           Past Medical History:   Diagnosis Date    Anemia     Arthritis     Atrial fibrillation (HCC)     Predominantly atrial flutter; flecainide ; atrial flutter ablation 16; unclear extent of a fib    Calculus of kidney     Conjunctivitis unspecified     Diabetes mellitus type II, uncontrolled     BS run between(100-170). Low BS s/sx:weak, shakes, =bs below 60.    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

## 2025-08-05 ENCOUNTER — OFFICE VISIT (OUTPATIENT)
Age: 85
End: 2025-08-05
Payer: MEDICARE

## 2025-08-05 VITALS
HEART RATE: 60 BPM | BODY MASS INDEX: 32.78 KG/M2 | WEIGHT: 229 LBS | DIASTOLIC BLOOD PRESSURE: 58 MMHG | HEIGHT: 70 IN | SYSTOLIC BLOOD PRESSURE: 150 MMHG

## 2025-08-05 DIAGNOSIS — I48.0 PAF (PAROXYSMAL ATRIAL FIBRILLATION) (HCC): Primary | ICD-10-CM

## 2025-08-05 DIAGNOSIS — I10 PRIMARY HYPERTENSION: Primary | ICD-10-CM

## 2025-08-05 DIAGNOSIS — I10 PRIMARY HYPERTENSION: ICD-10-CM

## 2025-08-05 PROCEDURE — 3078F DIAST BP <80 MM HG: CPT | Performed by: INTERNAL MEDICINE

## 2025-08-05 PROCEDURE — G8428 CUR MEDS NOT DOCUMENT: HCPCS | Performed by: INTERNAL MEDICINE

## 2025-08-05 PROCEDURE — 1126F AMNT PAIN NOTED NONE PRSNT: CPT | Performed by: INTERNAL MEDICINE

## 2025-08-05 PROCEDURE — 1123F ACP DISCUSS/DSCN MKR DOCD: CPT | Performed by: INTERNAL MEDICINE

## 2025-08-05 PROCEDURE — 99214 OFFICE O/P EST MOD 30 MIN: CPT | Performed by: INTERNAL MEDICINE

## 2025-08-05 PROCEDURE — 1036F TOBACCO NON-USER: CPT | Performed by: INTERNAL MEDICINE

## 2025-08-05 PROCEDURE — G8417 CALC BMI ABV UP PARAM F/U: HCPCS | Performed by: INTERNAL MEDICINE

## 2025-08-05 PROCEDURE — 3077F SYST BP >= 140 MM HG: CPT | Performed by: INTERNAL MEDICINE

## 2025-08-05 RX ORDER — AMILORIDE HYDROCHLORIDE 5 MG/1
5 TABLET ORAL DAILY
Qty: 30 TABLET | Refills: 3 | Status: SHIPPED | OUTPATIENT
Start: 2025-08-05

## 2025-08-21 RX ORDER — FLECAINIDE ACETATE 100 MG/1
100 TABLET ORAL 2 TIMES DAILY
Qty: 180 TABLET | Refills: 0 | Status: SHIPPED | OUTPATIENT
Start: 2025-08-21

## (undated) DEVICE — T4 HOOD

## (undated) DEVICE — SUTURE STRATAFIX SPRL SZ 1 L14IN ABSRB VLT L48CM CTX 1/2 SXPD2B405

## (undated) DEVICE — BIPOLAR SEALER 23-112-1 AQM 6.0: Brand: AQUAMANTYS ®

## (undated) DEVICE — Z DISCONTINUED USE 2744636  DRESSING AQUACEL 14 IN ALG W3.5XL14IN POLYUR FLM CVR W/ HYDRCOLL

## (undated) DEVICE — TRAY PREP DRY W/ PREM GLV 2 APPL 6 SPNG 2 UNDPD 1 OVERWRAP

## (undated) DEVICE — STERILE PRESSURE PROTECTOR PAD® FOR DE MAYO UNIVERSAL DISTRACTOR® (10/CASE): Brand: DE MAYO UNIVERSAL DISTRACTOR®

## (undated) DEVICE — NEEDLE HYPO 18GA L1.5IN PNK S STL HUB POLYPR SHLD REG BVL

## (undated) DEVICE — BUTTON SWITCH PENCIL BLADE ELECTRODE, HOLSTER: Brand: EDGE

## (undated) DEVICE — BNDG COHESIVE 4INX5YD COBAN --

## (undated) DEVICE — X-RAY SPONGES,12 PLY: Brand: DERMACEA

## (undated) DEVICE — DRAPE,TOP,102X53,STERILE: Brand: MEDLINE

## (undated) DEVICE — Device: Brand: POWER-FLO®

## (undated) DEVICE — (D)PREP SKN CHLRAPRP APPL 26ML -- CONVERT TO ITEM 371833

## (undated) DEVICE — SOLUTION IV 1000ML 0.9% SOD CHL

## (undated) DEVICE — SUTURE VCRL SZ 2-0 L27IN ABSRB UD L36MM CP-1 1/2 CIR REV J266H

## (undated) DEVICE — DRAPE SHT 3 QTR PROXIMA 53X77 --

## (undated) DEVICE — SYR LR LCK 1ML GRAD NSAF 30ML --

## (undated) DEVICE — GOWN,REINF,POLY,ECL,PP SLV,XL: Brand: MEDLINE

## (undated) DEVICE — BLADE SAW W12.5XL70MM THK0.8MM CUT THK1.12MM S STL RECIP

## (undated) DEVICE — DRAPE,U/SHT,SPLIT,FILM,60X84,STERILE: Brand: MEDLINE

## (undated) DEVICE — SUTURE VCRL SZ 1 L27IN ABSRB UD L36MM CP-1 1/2 CIR REV CUT J268H

## (undated) DEVICE — TOTAL KNEE DR RIDGEWAY: Brand: MEDLINE INDUSTRIES, INC.

## (undated) DEVICE — REM POLYHESIVE ADULT PATIENT RETURN ELECTRODE: Brand: VALLEYLAB

## (undated) DEVICE — SOLUTION IRRIG 3000ML 0.9% SOD CHL FLX CONT 0797208] ICU MEDICAL INC]

## (undated) DEVICE — DUAL CUT SAGITTAL BLADE

## (undated) DEVICE — HANDPIECE SET WITH COAXIAL HIGH FLOW TIP AND SUCTION TUBE: Brand: INTERPULSE

## (undated) DEVICE — SOLUTION IV 500ML 0.9% SOD CHL FLX CONT

## (undated) DEVICE — CLOSURE SKIN FLX NONINVASIVE PRELOC TECHNOLOGY FOR 24IN

## (undated) DEVICE — SYR 50ML LR LCK 1ML GRAD NSAF --

## (undated) DEVICE — 3M™ STERI-DRAPE™ INSTRUMENT POUCH 1018: Brand: STERI-DRAPE™